# Patient Record
Sex: FEMALE | Race: WHITE | NOT HISPANIC OR LATINO | Employment: OTHER | URBAN - METROPOLITAN AREA
[De-identification: names, ages, dates, MRNs, and addresses within clinical notes are randomized per-mention and may not be internally consistent; named-entity substitution may affect disease eponyms.]

---

## 2017-10-23 ENCOUNTER — CONVERSION ENCOUNTER (OUTPATIENT)
Dept: GENERAL RADIOLOGY | Facility: HOSPITAL | Age: 67
End: 2017-10-23

## 2018-03-19 ENCOUNTER — OFFICE VISIT CONVERTED (OUTPATIENT)
Dept: NEUROLOGY | Facility: CLINIC | Age: 68
End: 2018-03-19
Attending: PSYCHIATRY & NEUROLOGY

## 2018-03-19 ENCOUNTER — CONVERSION ENCOUNTER (OUTPATIENT)
Dept: NEUROLOGY | Facility: CLINIC | Age: 68
End: 2018-03-19

## 2018-04-13 ENCOUNTER — CONVERSION ENCOUNTER (OUTPATIENT)
Dept: SURGERY | Facility: CLINIC | Age: 68
End: 2018-04-13

## 2018-04-13 ENCOUNTER — OFFICE VISIT CONVERTED (OUTPATIENT)
Dept: UROLOGY | Facility: CLINIC | Age: 68
End: 2018-04-13
Attending: UROLOGY

## 2018-05-08 ENCOUNTER — CONVERSION ENCOUNTER (OUTPATIENT)
Dept: SURGERY | Facility: CLINIC | Age: 68
End: 2018-05-08

## 2018-05-08 ENCOUNTER — OFFICE VISIT CONVERTED (OUTPATIENT)
Dept: SURGERY | Facility: CLINIC | Age: 68
End: 2018-05-08
Attending: NURSE PRACTITIONER

## 2018-10-25 ENCOUNTER — CONVERSION ENCOUNTER (OUTPATIENT)
Dept: GENERAL RADIOLOGY | Facility: HOSPITAL | Age: 68
End: 2018-10-25

## 2018-11-20 ENCOUNTER — OFFICE VISIT CONVERTED (OUTPATIENT)
Dept: NEUROSURGERY | Facility: CLINIC | Age: 68
End: 2018-11-20
Attending: PHYSICIAN ASSISTANT

## 2019-01-08 ENCOUNTER — OFFICE VISIT CONVERTED (OUTPATIENT)
Dept: NEUROSURGERY | Facility: CLINIC | Age: 69
End: 2019-01-08
Attending: NEUROLOGICAL SURGERY

## 2020-02-06 ENCOUNTER — OFFICE VISIT CONVERTED (OUTPATIENT)
Dept: ORTHOPEDIC SURGERY | Facility: CLINIC | Age: 70
End: 2020-02-06
Attending: PHYSICIAN ASSISTANT

## 2020-03-02 ENCOUNTER — HOSPITAL ENCOUNTER (OUTPATIENT)
Dept: GENERAL RADIOLOGY | Facility: HOSPITAL | Age: 70
Discharge: HOME | End: 2020-03-02
Attending: NURSE PRACTITIONER

## 2020-06-05 ENCOUNTER — CONVERSION ENCOUNTER (OUTPATIENT)
Dept: FAMILY MEDICINE CLINIC | Facility: CLINIC | Age: 70
End: 2020-06-05

## 2020-06-05 ENCOUNTER — HOSPITAL ENCOUNTER (OUTPATIENT)
Dept: LAB | Facility: HOSPITAL | Age: 70
Discharge: HOME | End: 2020-06-05
Attending: FAMILY MEDICINE

## 2020-06-05 ENCOUNTER — OFFICE VISIT CONVERTED (OUTPATIENT)
Dept: FAMILY MEDICINE CLINIC | Facility: CLINIC | Age: 70
End: 2020-06-05
Attending: FAMILY MEDICINE

## 2020-06-05 LAB
ALBUMIN SERPL-MCNC: 4.4 G/DL (ref 3.5–5)
ALBUMIN/GLOB SERPL: 1.4 {RATIO} (ref 1.4–2.6)
ALP SERPL-CCNC: 74 U/L (ref 43–160)
ALT SERPL-CCNC: 15 U/L (ref 10–40)
ANION GAP SERPL CALC-SCNC: 19 MMOL/L (ref 8–19)
AST SERPL-CCNC: 26 U/L (ref 15–50)
BASOPHILS # BLD AUTO: 0.05 10*3/UL (ref 0–0.2)
BASOPHILS NFR BLD AUTO: 0.8 % (ref 0–3)
BILIRUB SERPL-MCNC: 0.23 MG/DL (ref 0.2–1.3)
BUN SERPL-MCNC: 14 MG/DL (ref 5–25)
BUN/CREAT SERPL: 18 {RATIO} (ref 6–20)
CALCIUM SERPL-MCNC: 9.9 MG/DL (ref 8.7–10.4)
CHLORIDE SERPL-SCNC: 102 MMOL/L (ref 99–111)
CHOLEST SERPL-MCNC: 242 MG/DL (ref 107–200)
CHOLEST/HDLC SERPL: 3.4 {RATIO} (ref 3–6)
CONV ABS IMM GRAN: 0.01 10*3/UL (ref 0–0.2)
CONV CO2: 25 MMOL/L (ref 22–32)
CONV IMMATURE GRAN: 0.2 % (ref 0–1.8)
CONV TOTAL PROTEIN: 7.5 G/DL (ref 6.3–8.2)
CREAT UR-MCNC: 0.79 MG/DL (ref 0.5–0.9)
DEPRECATED RDW RBC AUTO: 49.3 FL (ref 36.4–46.3)
EOSINOPHIL # BLD AUTO: 0.04 10*3/UL (ref 0–0.7)
EOSINOPHIL # BLD AUTO: 0.6 % (ref 0–7)
ERYTHROCYTE [DISTWIDTH] IN BLOOD BY AUTOMATED COUNT: 13.2 % (ref 11.7–14.4)
GFR SERPLBLD BASED ON 1.73 SQ M-ARVRAT: >60 ML/MIN/{1.73_M2}
GLOBULIN UR ELPH-MCNC: 3.1 G/DL (ref 2–3.5)
GLUCOSE SERPL-MCNC: 96 MG/DL (ref 65–99)
HCT VFR BLD AUTO: 44.3 % (ref 37–47)
HDLC SERPL-MCNC: 72 MG/DL (ref 40–60)
HGB BLD-MCNC: 14.2 G/DL (ref 12–16)
LDLC SERPL CALC-MCNC: 143 MG/DL (ref 70–100)
LYMPHOCYTES # BLD AUTO: 2.54 10*3/UL (ref 1–5)
LYMPHOCYTES NFR BLD AUTO: 40.3 % (ref 20–45)
MCH RBC QN AUTO: 32.3 PG (ref 27–31)
MCHC RBC AUTO-ENTMCNC: 32.1 G/DL (ref 33–37)
MCV RBC AUTO: 100.9 FL (ref 81–99)
MONOCYTES # BLD AUTO: 0.48 10*3/UL (ref 0.2–1.2)
MONOCYTES NFR BLD AUTO: 7.6 % (ref 3–10)
NEUTROPHILS # BLD AUTO: 3.19 10*3/UL (ref 2–8)
NEUTROPHILS NFR BLD AUTO: 50.5 % (ref 30–85)
NRBC CBCN: 0 % (ref 0–0.7)
OSMOLALITY SERPL CALC.SUM OF ELEC: 294 MOSM/KG (ref 273–304)
PLATELET # BLD AUTO: 198 10*3/UL (ref 130–400)
PMV BLD AUTO: 10.7 FL (ref 9.4–12.3)
POTASSIUM SERPL-SCNC: 4.4 MMOL/L (ref 3.5–5.3)
RBC # BLD AUTO: 4.39 10*6/UL (ref 4.2–5.4)
SODIUM SERPL-SCNC: 142 MMOL/L (ref 135–147)
T4 FREE SERPL-MCNC: 1.1 NG/DL (ref 0.9–1.8)
TRIGL SERPL-MCNC: 133 MG/DL (ref 40–150)
TSH SERPL-ACNC: 1.44 M[IU]/L (ref 0.27–4.2)
VLDLC SERPL-MCNC: 27 MG/DL (ref 5–37)
WBC # BLD AUTO: 6.31 10*3/UL (ref 4.8–10.8)

## 2020-06-09 ENCOUNTER — HOSPITAL ENCOUNTER (OUTPATIENT)
Dept: LAB | Facility: HOSPITAL | Age: 70
Discharge: HOME | End: 2020-06-09
Attending: FAMILY MEDICINE

## 2020-06-09 LAB
FOLATE SERPL-MCNC: >20 NG/ML (ref 4.8–20)
VIT B12 SERPL-MCNC: 1110 PG/ML (ref 211–911)

## 2020-08-14 ENCOUNTER — CONVERSION ENCOUNTER (OUTPATIENT)
Dept: FAMILY MEDICINE CLINIC | Facility: CLINIC | Age: 70
End: 2020-08-14

## 2020-08-14 ENCOUNTER — OFFICE VISIT CONVERTED (OUTPATIENT)
Dept: FAMILY MEDICINE CLINIC | Facility: CLINIC | Age: 70
End: 2020-08-14
Attending: FAMILY MEDICINE

## 2020-08-17 ENCOUNTER — HOSPITAL ENCOUNTER (OUTPATIENT)
Dept: LAB | Facility: HOSPITAL | Age: 70
Discharge: HOME | End: 2020-08-17
Attending: FAMILY MEDICINE

## 2020-08-18 LAB
DSDNA AB SER-ACNC: NEGATIVE [IU]/ML
ENA AB SER IA-ACNC: NEGATIVE {RATIO}
SJOGREN'S ANTI-SS-A: <0.2 AI (ref 0–0.9)

## 2020-08-31 ENCOUNTER — HOSPITAL ENCOUNTER (OUTPATIENT)
Dept: PREADMISSION TESTING | Facility: HOSPITAL | Age: 70
Discharge: HOME | End: 2020-08-31
Attending: FAMILY MEDICINE

## 2020-09-02 LAB — SARS-COV-2 RNA SPEC QL NAA+PROBE: NOT DETECTED

## 2020-09-04 ENCOUNTER — HOSPITAL ENCOUNTER (OUTPATIENT)
Dept: CARDIOLOGY | Facility: HOSPITAL | Age: 70
Discharge: HOME | End: 2020-09-04
Attending: FAMILY MEDICINE

## 2020-09-15 ENCOUNTER — OFFICE VISIT CONVERTED (OUTPATIENT)
Dept: FAMILY MEDICINE CLINIC | Facility: CLINIC | Age: 70
End: 2020-09-15
Attending: FAMILY MEDICINE

## 2020-12-10 ENCOUNTER — OFFICE VISIT CONVERTED (OUTPATIENT)
Dept: FAMILY MEDICINE CLINIC | Facility: CLINIC | Age: 70
End: 2020-12-10
Attending: FAMILY MEDICINE

## 2020-12-22 ENCOUNTER — TELEMEDICINE CONVERTED (OUTPATIENT)
Dept: FAMILY MEDICINE CLINIC | Facility: CLINIC | Age: 70
End: 2020-12-22
Attending: FAMILY MEDICINE

## 2021-01-19 ENCOUNTER — TELEMEDICINE CONVERTED (OUTPATIENT)
Dept: PSYCHIATRY | Facility: CLINIC | Age: 71
End: 2021-01-19
Attending: STUDENT IN AN ORGANIZED HEALTH CARE EDUCATION/TRAINING PROGRAM

## 2021-01-25 ENCOUNTER — OFFICE VISIT CONVERTED (OUTPATIENT)
Dept: FAMILY MEDICINE CLINIC | Facility: CLINIC | Age: 71
End: 2021-01-25
Attending: FAMILY MEDICINE

## 2021-01-25 ENCOUNTER — CONVERSION ENCOUNTER (OUTPATIENT)
Dept: FAMILY MEDICINE CLINIC | Facility: CLINIC | Age: 71
End: 2021-01-25

## 2021-02-05 ENCOUNTER — TELEMEDICINE CONVERTED (OUTPATIENT)
Dept: PSYCHIATRY | Facility: CLINIC | Age: 71
End: 2021-02-05
Attending: STUDENT IN AN ORGANIZED HEALTH CARE EDUCATION/TRAINING PROGRAM

## 2021-02-09 ENCOUNTER — HOSPITAL ENCOUNTER (OUTPATIENT)
Dept: OTHER | Facility: HOSPITAL | Age: 71
Discharge: HOME | End: 2021-02-09
Attending: FAMILY MEDICINE

## 2021-02-23 ENCOUNTER — OFFICE VISIT CONVERTED (OUTPATIENT)
Dept: FAMILY MEDICINE CLINIC | Facility: CLINIC | Age: 71
End: 2021-02-23
Attending: FAMILY MEDICINE

## 2021-02-23 ENCOUNTER — CONVERSION ENCOUNTER (OUTPATIENT)
Dept: FAMILY MEDICINE CLINIC | Facility: CLINIC | Age: 71
End: 2021-02-23

## 2021-03-08 ENCOUNTER — TELEMEDICINE CONVERTED (OUTPATIENT)
Dept: PSYCHIATRY | Facility: CLINIC | Age: 71
End: 2021-03-08
Attending: STUDENT IN AN ORGANIZED HEALTH CARE EDUCATION/TRAINING PROGRAM

## 2021-05-14 VITALS
OXYGEN SATURATION: 97 % | BODY MASS INDEX: 19.89 KG/M2 | SYSTOLIC BLOOD PRESSURE: 156 MMHG | TEMPERATURE: 98.7 F | HEART RATE: 78 BPM | WEIGHT: 105.37 LBS | DIASTOLIC BLOOD PRESSURE: 71 MMHG | HEIGHT: 61 IN

## 2021-05-14 VITALS
OXYGEN SATURATION: 100 % | DIASTOLIC BLOOD PRESSURE: 68 MMHG | WEIGHT: 104.5 LBS | BODY MASS INDEX: 19.73 KG/M2 | TEMPERATURE: 97.7 F | SYSTOLIC BLOOD PRESSURE: 147 MMHG | HEART RATE: 80 BPM | HEIGHT: 61 IN

## 2021-05-14 VITALS
HEIGHT: 61 IN | WEIGHT: 104.37 LBS | HEART RATE: 78 BPM | OXYGEN SATURATION: 97 % | BODY MASS INDEX: 19.7 KG/M2 | TEMPERATURE: 97.5 F | DIASTOLIC BLOOD PRESSURE: 62 MMHG | SYSTOLIC BLOOD PRESSURE: 164 MMHG

## 2021-05-14 VITALS — DIASTOLIC BLOOD PRESSURE: 70 MMHG | SYSTOLIC BLOOD PRESSURE: 138 MMHG

## 2021-05-14 VITALS
HEART RATE: 72 BPM | SYSTOLIC BLOOD PRESSURE: 130 MMHG | BODY MASS INDEX: 20.01 KG/M2 | TEMPERATURE: 97.3 F | OXYGEN SATURATION: 100 % | WEIGHT: 106 LBS | HEIGHT: 61 IN | DIASTOLIC BLOOD PRESSURE: 100 MMHG

## 2021-05-15 VITALS
SYSTOLIC BLOOD PRESSURE: 170 MMHG | WEIGHT: 106.25 LBS | SYSTOLIC BLOOD PRESSURE: 172 MMHG | HEART RATE: 71 BPM | BODY MASS INDEX: 20.06 KG/M2 | HEIGHT: 61 IN | TEMPERATURE: 98.4 F | OXYGEN SATURATION: 99 % | DIASTOLIC BLOOD PRESSURE: 61 MMHG | DIASTOLIC BLOOD PRESSURE: 82 MMHG

## 2021-05-15 VITALS — HEART RATE: 83 BPM | BODY MASS INDEX: 20.44 KG/M2 | HEIGHT: 61 IN | WEIGHT: 108.25 LBS | OXYGEN SATURATION: 95 %

## 2021-05-15 VITALS
SYSTOLIC BLOOD PRESSURE: 129 MMHG | DIASTOLIC BLOOD PRESSURE: 65 MMHG | TEMPERATURE: 97.8 F | HEART RATE: 67 BPM | HEIGHT: 61 IN | WEIGHT: 107.5 LBS | BODY MASS INDEX: 20.3 KG/M2 | OXYGEN SATURATION: 96 %

## 2021-05-16 VITALS — WEIGHT: 102 LBS | RESPIRATION RATE: 14 BRPM | HEIGHT: 61 IN | BODY MASS INDEX: 19.26 KG/M2

## 2021-05-16 VITALS
HEART RATE: 80 BPM | SYSTOLIC BLOOD PRESSURE: 127 MMHG | HEIGHT: 61 IN | WEIGHT: 103 LBS | BODY MASS INDEX: 19.45 KG/M2 | DIASTOLIC BLOOD PRESSURE: 74 MMHG

## 2021-05-16 VITALS
HEIGHT: 61 IN | SYSTOLIC BLOOD PRESSURE: 127 MMHG | WEIGHT: 105.5 LBS | DIASTOLIC BLOOD PRESSURE: 56 MMHG | BODY MASS INDEX: 19.92 KG/M2

## 2021-05-16 VITALS — HEIGHT: 61 IN | WEIGHT: 102 LBS | RESPIRATION RATE: 16 BRPM | BODY MASS INDEX: 19.26 KG/M2

## 2021-05-16 VITALS
HEIGHT: 61 IN | SYSTOLIC BLOOD PRESSURE: 130 MMHG | BODY MASS INDEX: 19.45 KG/M2 | DIASTOLIC BLOOD PRESSURE: 67 MMHG | HEART RATE: 68 BPM | WEIGHT: 103 LBS

## 2021-05-17 ENCOUNTER — OFFICE VISIT CONVERTED (OUTPATIENT)
Dept: FAMILY MEDICINE CLINIC | Facility: CLINIC | Age: 71
End: 2021-05-17
Attending: FAMILY MEDICINE

## 2021-05-23 ENCOUNTER — TRANSCRIBE ORDERS (OUTPATIENT)
Dept: FAMILY MEDICINE CLINIC | Facility: CLINIC | Age: 71
End: 2021-05-23

## 2021-05-23 DIAGNOSIS — Z12.31 ENCOUNTER FOR SCREENING MAMMOGRAM FOR BREAST CANCER: Primary | ICD-10-CM

## 2021-06-08 ENCOUNTER — TELEMEDICINE (OUTPATIENT)
Dept: PSYCHIATRY | Facility: CLINIC | Age: 71
End: 2021-06-08

## 2021-06-08 DIAGNOSIS — F31.81 BIPOLAR 2 DISORDER (HCC): Primary | ICD-10-CM

## 2021-06-08 PROCEDURE — 99213 OFFICE O/P EST LOW 20 MIN: CPT | Performed by: STUDENT IN AN ORGANIZED HEALTH CARE EDUCATION/TRAINING PROGRAM

## 2021-06-08 RX ORDER — BUDESONIDE AND FORMOTEROL FUMARATE DIHYDRATE 80; 4.5 UG/1; UG/1
AEROSOL RESPIRATORY (INHALATION)
COMMUNITY
Start: 2020-12-10 | End: 2022-03-24

## 2021-06-08 RX ORDER — ACETAMINOPHEN 500 MG
1 TABLET ORAL EVERY 6 HOURS PRN
COMMUNITY

## 2021-06-08 RX ORDER — QUETIAPINE FUMARATE 25 MG/1
75 TABLET, FILM COATED ORAL NIGHTLY
Qty: 270 TABLET | Refills: 1 | Status: SHIPPED | OUTPATIENT
Start: 2021-06-08 | End: 2021-08-09 | Stop reason: SDUPTHER

## 2021-06-08 RX ORDER — FLUTICASONE PROPIONATE 50 MCG
1 SPRAY, SUSPENSION (ML) NASAL DAILY
COMMUNITY
End: 2021-10-06 | Stop reason: SDUPTHER

## 2021-06-08 RX ORDER — CLONAZEPAM 1 MG/1
TABLET ORAL
COMMUNITY
Start: 2021-03-08 | End: 2022-02-14 | Stop reason: SDUPTHER

## 2021-06-08 RX ORDER — LANOLIN ALCOHOL/MO/W.PET/CERES
1 CREAM (GRAM) TOPICAL DAILY
COMMUNITY
End: 2021-08-09

## 2021-06-08 RX ORDER — MULTIVITAMIN WITH IRON
1 TABLET ORAL DAILY
COMMUNITY
End: 2022-10-06

## 2021-06-08 RX ORDER — B-COMPLEX WITH VITAMIN C
TABLET ORAL
COMMUNITY
End: 2022-10-06

## 2021-06-08 RX ORDER — ZOLPIDEM TARTRATE 12.5 MG/1
12.5 TABLET, FILM COATED, EXTENDED RELEASE ORAL NIGHTLY PRN
Qty: 90 TABLET | Refills: 1 | Status: SHIPPED | OUTPATIENT
Start: 2021-06-08 | End: 2021-12-05

## 2021-06-08 RX ORDER — DIPHENOXYLATE HYDROCHLORIDE AND ATROPINE SULFATE 2.5; .025 MG/1; MG/1
1 TABLET ORAL DAILY
COMMUNITY
End: 2022-09-16 | Stop reason: DRUGHIGH

## 2021-06-08 RX ORDER — QUETIAPINE FUMARATE 25 MG/1
TABLET, FILM COATED ORAL
COMMUNITY
Start: 2021-03-09 | End: 2021-06-08 | Stop reason: SDUPTHER

## 2021-07-15 VITALS
BODY MASS INDEX: 20.39 KG/M2 | HEART RATE: 81 BPM | OXYGEN SATURATION: 99 % | HEIGHT: 61 IN | WEIGHT: 108 LBS | SYSTOLIC BLOOD PRESSURE: 147 MMHG | DIASTOLIC BLOOD PRESSURE: 59 MMHG | TEMPERATURE: 97.9 F

## 2021-08-09 ENCOUNTER — OFFICE VISIT (OUTPATIENT)
Dept: PSYCHIATRY | Facility: CLINIC | Age: 71
End: 2021-08-09

## 2021-08-09 VITALS
SYSTOLIC BLOOD PRESSURE: 162 MMHG | HEIGHT: 62 IN | BODY MASS INDEX: 20.09 KG/M2 | DIASTOLIC BLOOD PRESSURE: 73 MMHG | WEIGHT: 109.2 LBS

## 2021-08-09 DIAGNOSIS — F51.05 INSOMNIA DUE TO MENTAL CONDITION: ICD-10-CM

## 2021-08-09 DIAGNOSIS — F31.81 BIPOLAR 2 DISORDER (HCC): Primary | ICD-10-CM

## 2021-08-09 DIAGNOSIS — F41.1 GENERALIZED ANXIETY DISORDER: ICD-10-CM

## 2021-08-09 PROBLEM — K46.9 ABDOMINAL HERNIA: Status: ACTIVE | Noted: 2021-08-09

## 2021-08-09 PROBLEM — F32.A DEPRESSION: Status: ACTIVE | Noted: 2020-01-04

## 2021-08-09 PROBLEM — M51.369 DEGENERATION OF LUMBAR INTERVERTEBRAL DISC: Status: ACTIVE | Noted: 2019-01-08

## 2021-08-09 PROBLEM — E78.5 HYPERLIPIDEMIA: Status: ACTIVE | Noted: 2021-08-09

## 2021-08-09 PROBLEM — R06.02 SHORTNESS OF BREATH: Status: ACTIVE | Noted: 2021-08-09

## 2021-08-09 PROBLEM — R41.3 MEMORY CHANGE: Status: ACTIVE | Noted: 2018-03-19

## 2021-08-09 PROBLEM — K64.9 HEMORRHOID: Status: ACTIVE | Noted: 2021-08-09

## 2021-08-09 PROBLEM — G47.00 INSOMNIA: Status: ACTIVE | Noted: 2020-01-04

## 2021-08-09 PROBLEM — IMO0002 DISPLACEMENT OF INTERVERTEBRAL DISC WITHOUT MYELOPATHY: Status: ACTIVE | Noted: 2021-08-09

## 2021-08-09 PROBLEM — J45.909 ASTHMA: Status: ACTIVE | Noted: 2021-08-09

## 2021-08-09 PROBLEM — M54.2 NECK PAIN: Status: ACTIVE | Noted: 2019-01-08

## 2021-08-09 PROBLEM — F41.9 ANXIETY: Status: ACTIVE | Noted: 2020-01-04

## 2021-08-09 PROBLEM — M47.816 LUMBAR SPONDYLOSIS: Status: ACTIVE | Noted: 2019-05-22

## 2021-08-09 PROBLEM — M79.89 LIMB SWELLING: Status: ACTIVE | Noted: 2021-08-09

## 2021-08-09 PROBLEM — J30.9 ALLERGIC RHINITIS: Status: ACTIVE | Noted: 2021-08-09

## 2021-08-09 PROBLEM — K21.9 ESOPHAGEAL REFLUX: Status: ACTIVE | Noted: 2021-08-09

## 2021-08-09 PROBLEM — IMO0002 DEGENERATION OF INTERVERTEBRAL DISC: Status: ACTIVE | Noted: 2021-08-09

## 2021-08-09 PROBLEM — M54.2 CERVICAL PAIN (NECK): Status: ACTIVE | Noted: 2021-08-09

## 2021-08-09 PROBLEM — J44.9 COPD (CHRONIC OBSTRUCTIVE PULMONARY DISEASE): Status: ACTIVE | Noted: 2021-08-09

## 2021-08-09 PROBLEM — M54.12 CERVICAL RADICULOPATHY: Status: ACTIVE | Noted: 2019-01-08

## 2021-08-09 PROBLEM — M54.50 LOW BACK PAIN: Status: ACTIVE | Noted: 2021-08-09

## 2021-08-09 PROBLEM — M79.606 LEG PAIN: Status: ACTIVE | Noted: 2021-08-09

## 2021-08-09 PROBLEM — M51.36 DEGENERATION OF LUMBAR INTERVERTEBRAL DISC: Status: ACTIVE | Noted: 2019-01-08

## 2021-08-09 PROBLEM — F33.0 MILD EPISODE OF RECURRENT MAJOR DEPRESSIVE DISORDER (HCC): Status: ACTIVE | Noted: 2020-06-05

## 2021-08-09 PROBLEM — M19.90 OSTEOARTHRITIS: Status: ACTIVE | Noted: 2020-01-04

## 2021-08-09 PROBLEM — M19.90 ARTHRITIS: Status: ACTIVE | Noted: 2021-08-09

## 2021-08-09 PROCEDURE — 99214 OFFICE O/P EST MOD 30 MIN: CPT | Performed by: STUDENT IN AN ORGANIZED HEALTH CARE EDUCATION/TRAINING PROGRAM

## 2021-08-09 RX ORDER — QUETIAPINE FUMARATE 25 MG/1
100 TABLET, FILM COATED ORAL NIGHTLY
Qty: 240 TABLET | Refills: 0
Start: 2021-08-09 | End: 2021-08-09 | Stop reason: SDUPTHER

## 2021-08-09 RX ORDER — CELECOXIB 200 MG/1
CAPSULE ORAL
COMMUNITY
Start: 2021-07-05 | End: 2021-11-22

## 2021-08-09 RX ORDER — QUETIAPINE FUMARATE 25 MG/1
100 TABLET, FILM COATED ORAL NIGHTLY
Qty: 360 TABLET | Refills: 1
Start: 2021-08-09 | End: 2021-09-16

## 2021-08-18 ENCOUNTER — OFFICE VISIT (OUTPATIENT)
Dept: PSYCHIATRY | Facility: CLINIC | Age: 71
End: 2021-08-18

## 2021-08-18 VITALS
BODY MASS INDEX: 20.24 KG/M2 | DIASTOLIC BLOOD PRESSURE: 74 MMHG | WEIGHT: 110 LBS | HEIGHT: 62 IN | SYSTOLIC BLOOD PRESSURE: 124 MMHG

## 2021-08-18 DIAGNOSIS — F51.05 INSOMNIA DUE TO MENTAL CONDITION: ICD-10-CM

## 2021-08-18 DIAGNOSIS — F41.1 GENERALIZED ANXIETY DISORDER: ICD-10-CM

## 2021-08-18 DIAGNOSIS — F31.81 BIPOLAR 2 DISORDER (HCC): Primary | ICD-10-CM

## 2021-08-18 PROCEDURE — 99214 OFFICE O/P EST MOD 30 MIN: CPT | Performed by: STUDENT IN AN ORGANIZED HEALTH CARE EDUCATION/TRAINING PROGRAM

## 2021-08-24 ENCOUNTER — TELEPHONE (OUTPATIENT)
Dept: GASTROENTEROLOGY | Facility: CLINIC | Age: 71
End: 2021-08-24

## 2021-08-24 RX ORDER — SODIUM, POTASSIUM,MAG SULFATES 17.5-3.13G
1 SOLUTION, RECONSTITUTED, ORAL ORAL EVERY 12 HOURS
Qty: 177 ML | Refills: 0 | Status: SHIPPED | OUTPATIENT
Start: 2021-08-24 | End: 2022-02-14

## 2021-08-26 ENCOUNTER — TELEPHONE (OUTPATIENT)
Dept: GASTROENTEROLOGY | Facility: CLINIC | Age: 71
End: 2021-08-26

## 2021-08-26 RX ORDER — SODIUM, POTASSIUM,MAG SULFATES 17.5-3.13G
1 SOLUTION, RECONSTITUTED, ORAL ORAL EVERY 12 HOURS
Qty: 354 ML | Refills: 0 | Status: SHIPPED | OUTPATIENT
Start: 2021-08-26 | End: 2022-02-14

## 2021-09-16 ENCOUNTER — TELEMEDICINE (OUTPATIENT)
Dept: PSYCHIATRY | Facility: CLINIC | Age: 71
End: 2021-09-16

## 2021-09-16 VITALS
DIASTOLIC BLOOD PRESSURE: 74 MMHG | WEIGHT: 109 LBS | SYSTOLIC BLOOD PRESSURE: 155 MMHG | HEIGHT: 63 IN | BODY MASS INDEX: 19.31 KG/M2

## 2021-09-16 DIAGNOSIS — F51.05 INSOMNIA DUE TO MENTAL CONDITION: ICD-10-CM

## 2021-09-16 DIAGNOSIS — F41.1 GENERALIZED ANXIETY DISORDER: ICD-10-CM

## 2021-09-16 DIAGNOSIS — F31.81 BIPOLAR 2 DISORDER (HCC): Primary | ICD-10-CM

## 2021-09-16 PROCEDURE — 99214 OFFICE O/P EST MOD 30 MIN: CPT | Performed by: STUDENT IN AN ORGANIZED HEALTH CARE EDUCATION/TRAINING PROGRAM

## 2021-09-16 RX ORDER — UREA 10 %
3 LOTION (ML) TOPICAL NIGHTLY
COMMUNITY
End: 2022-10-06

## 2021-09-16 RX ORDER — ALBUTEROL SULFATE 90 UG/1
2 AEROSOL, METERED RESPIRATORY (INHALATION) EVERY 4 HOURS PRN
COMMUNITY

## 2021-09-16 RX ORDER — QUETIAPINE 150 MG/1
150 TABLET, FILM COATED, EXTENDED RELEASE ORAL NIGHTLY
Qty: 30 TABLET | Refills: 2 | Status: SHIPPED | OUTPATIENT
Start: 2021-09-16 | End: 2021-10-14 | Stop reason: SDUPTHER

## 2021-09-16 RX ORDER — LIDOCAINE 50 MG/G
1 PATCH TOPICAL EVERY 24 HOURS
COMMUNITY
End: 2022-02-09 | Stop reason: SDUPTHER

## 2021-10-07 ENCOUNTER — OFFICE VISIT (OUTPATIENT)
Dept: SLEEP MEDICINE | Facility: HOSPITAL | Age: 71
End: 2021-10-07

## 2021-10-07 VITALS
DIASTOLIC BLOOD PRESSURE: 84 MMHG | WEIGHT: 112.2 LBS | SYSTOLIC BLOOD PRESSURE: 146 MMHG | HEART RATE: 85 BPM | BODY MASS INDEX: 21.18 KG/M2 | OXYGEN SATURATION: 98 % | TEMPERATURE: 94 F | HEIGHT: 61 IN

## 2021-10-07 DIAGNOSIS — G47.61 PLMD (PERIODIC LIMB MOVEMENT DISORDER): Primary | ICD-10-CM

## 2021-10-07 PROCEDURE — G0463 HOSPITAL OUTPT CLINIC VISIT: HCPCS

## 2021-10-07 RX ORDER — FLUTICASONE PROPIONATE 50 MCG
1 SPRAY, SUSPENSION (ML) NASAL DAILY
Qty: 16 G | Refills: 3 | Status: SHIPPED | OUTPATIENT
Start: 2021-10-07

## 2021-10-14 DIAGNOSIS — F51.05 INSOMNIA DUE TO MENTAL CONDITION: ICD-10-CM

## 2021-10-14 DIAGNOSIS — F31.81 BIPOLAR 2 DISORDER (HCC): ICD-10-CM

## 2021-10-14 RX ORDER — QUETIAPINE 150 MG/1
150 TABLET, FILM COATED, EXTENDED RELEASE ORAL NIGHTLY
Qty: 90 TABLET | Refills: 1 | Status: SHIPPED | OUTPATIENT
Start: 2021-10-14 | End: 2021-10-29 | Stop reason: SINTOL

## 2021-10-29 ENCOUNTER — OFFICE VISIT (OUTPATIENT)
Dept: PSYCHIATRY | Facility: CLINIC | Age: 71
End: 2021-10-29

## 2021-10-29 VITALS
HEIGHT: 62 IN | WEIGHT: 112.4 LBS | SYSTOLIC BLOOD PRESSURE: 177 MMHG | BODY MASS INDEX: 20.68 KG/M2 | DIASTOLIC BLOOD PRESSURE: 75 MMHG

## 2021-10-29 DIAGNOSIS — F41.1 GENERALIZED ANXIETY DISORDER: ICD-10-CM

## 2021-10-29 DIAGNOSIS — F51.05 INSOMNIA DUE TO MENTAL CONDITION: ICD-10-CM

## 2021-10-29 DIAGNOSIS — F31.81 BIPOLAR 2 DISORDER (HCC): Primary | ICD-10-CM

## 2021-10-29 PROCEDURE — 90833 PSYTX W PT W E/M 30 MIN: CPT | Performed by: STUDENT IN AN ORGANIZED HEALTH CARE EDUCATION/TRAINING PROGRAM

## 2021-10-29 PROCEDURE — 99214 OFFICE O/P EST MOD 30 MIN: CPT | Performed by: STUDENT IN AN ORGANIZED HEALTH CARE EDUCATION/TRAINING PROGRAM

## 2021-10-29 RX ORDER — LURASIDONE HYDROCHLORIDE 20 MG/1
20 TABLET, FILM COATED ORAL DAILY
Qty: 28 TABLET | Refills: 0 | COMMUNITY
Start: 2021-10-29 | End: 2021-10-29 | Stop reason: SDUPTHER

## 2021-10-29 RX ORDER — LURASIDONE HYDROCHLORIDE 40 MG/1
40 TABLET, FILM COATED ORAL DAILY
Qty: 28 TABLET | Refills: 0 | COMMUNITY
Start: 2021-10-29 | End: 2022-02-10

## 2021-10-29 RX ORDER — CETIRIZINE HYDROCHLORIDE 10 MG/1
10 TABLET ORAL DAILY
COMMUNITY
End: 2022-03-24

## 2021-11-03 ENCOUNTER — TELEPHONE (OUTPATIENT)
Dept: PSYCHIATRY | Facility: CLINIC | Age: 71
End: 2021-11-03

## 2021-11-04 ENCOUNTER — APPOINTMENT (OUTPATIENT)
Dept: MAMMOGRAPHY | Facility: HOSPITAL | Age: 71
End: 2021-11-04

## 2021-11-15 ENCOUNTER — TELEMEDICINE (OUTPATIENT)
Dept: PSYCHIATRY | Facility: CLINIC | Age: 71
End: 2021-11-15

## 2021-11-15 VITALS
SYSTOLIC BLOOD PRESSURE: 172 MMHG | WEIGHT: 110.8 LBS | HEIGHT: 62 IN | BODY MASS INDEX: 20.39 KG/M2 | DIASTOLIC BLOOD PRESSURE: 75 MMHG

## 2021-11-15 DIAGNOSIS — F31.81 BIPOLAR 2 DISORDER (HCC): Primary | ICD-10-CM

## 2021-11-15 DIAGNOSIS — F41.1 GENERALIZED ANXIETY DISORDER: ICD-10-CM

## 2021-11-15 DIAGNOSIS — F51.05 INSOMNIA DUE TO MENTAL CONDITION: ICD-10-CM

## 2021-11-15 PROCEDURE — 99214 OFFICE O/P EST MOD 30 MIN: CPT | Performed by: STUDENT IN AN ORGANIZED HEALTH CARE EDUCATION/TRAINING PROGRAM

## 2021-11-15 PROCEDURE — 90833 PSYTX W PT W E/M 30 MIN: CPT | Performed by: STUDENT IN AN ORGANIZED HEALTH CARE EDUCATION/TRAINING PROGRAM

## 2021-11-15 RX ORDER — QUETIAPINE FUMARATE 100 MG/1
100 TABLET, FILM COATED ORAL NIGHTLY
COMMUNITY
End: 2021-11-30 | Stop reason: SDUPTHER

## 2021-11-18 ENCOUNTER — OFFICE VISIT (OUTPATIENT)
Dept: FAMILY MEDICINE CLINIC | Facility: CLINIC | Age: 71
End: 2021-11-18

## 2021-11-18 ENCOUNTER — LAB (OUTPATIENT)
Dept: LAB | Facility: HOSPITAL | Age: 71
End: 2021-11-18

## 2021-11-18 VITALS
OXYGEN SATURATION: 95 % | HEIGHT: 62 IN | TEMPERATURE: 97.1 F | HEART RATE: 92 BPM | WEIGHT: 110.4 LBS | BODY MASS INDEX: 20.32 KG/M2 | DIASTOLIC BLOOD PRESSURE: 62 MMHG | SYSTOLIC BLOOD PRESSURE: 164 MMHG

## 2021-11-18 DIAGNOSIS — R03.0 ELEVATED BLOOD PRESSURE READING IN OFFICE WITHOUT DIAGNOSIS OF HYPERTENSION: ICD-10-CM

## 2021-11-18 DIAGNOSIS — Z23 NEED FOR INFLUENZA VACCINATION: Primary | ICD-10-CM

## 2021-11-18 DIAGNOSIS — J43.9 PULMONARY EMPHYSEMA, UNSPECIFIED EMPHYSEMA TYPE (HCC): ICD-10-CM

## 2021-11-18 DIAGNOSIS — F33.0 MILD EPISODE OF RECURRENT MAJOR DEPRESSIVE DISORDER (HCC): ICD-10-CM

## 2021-11-18 DIAGNOSIS — E78.5 HYPERLIPIDEMIA, UNSPECIFIED HYPERLIPIDEMIA TYPE: ICD-10-CM

## 2021-11-18 DIAGNOSIS — K21.9 GASTROESOPHAGEAL REFLUX DISEASE WITHOUT ESOPHAGITIS: ICD-10-CM

## 2021-11-18 LAB
ALBUMIN SERPL-MCNC: 4.4 G/DL (ref 3.5–5.2)
ALBUMIN/GLOB SERPL: 1.4 G/DL
ALP SERPL-CCNC: 95 U/L (ref 39–117)
ALT SERPL W P-5'-P-CCNC: 13 U/L (ref 1–33)
ANION GAP SERPL CALCULATED.3IONS-SCNC: 8.8 MMOL/L (ref 5–15)
AST SERPL-CCNC: 30 U/L (ref 1–32)
BILIRUB SERPL-MCNC: <0.2 MG/DL (ref 0–1.2)
BUN SERPL-MCNC: 14 MG/DL (ref 8–23)
BUN/CREAT SERPL: 13.1 (ref 7–25)
CALCIUM SPEC-SCNC: 9.9 MG/DL (ref 8.6–10.5)
CHLORIDE SERPL-SCNC: 99 MMOL/L (ref 98–107)
CHOLEST SERPL-MCNC: 279 MG/DL (ref 0–200)
CO2 SERPL-SCNC: 32.2 MMOL/L (ref 22–29)
CREAT SERPL-MCNC: 1.07 MG/DL (ref 0.57–1)
GFR SERPL CREATININE-BSD FRML MDRD: 50 ML/MIN/1.73
GLOBULIN UR ELPH-MCNC: 3.2 GM/DL
GLUCOSE SERPL-MCNC: 90 MG/DL (ref 65–99)
HDLC SERPL-MCNC: 81 MG/DL (ref 40–60)
LDLC SERPL CALC-MCNC: 182 MG/DL (ref 0–100)
LDLC/HDLC SERPL: 2.2 {RATIO}
POTASSIUM SERPL-SCNC: 4.2 MMOL/L (ref 3.5–5.2)
PROT SERPL-MCNC: 7.6 G/DL (ref 6–8.5)
SODIUM SERPL-SCNC: 140 MMOL/L (ref 136–145)
T4 FREE SERPL-MCNC: 1.11 NG/DL (ref 0.93–1.7)
TRIGL SERPL-MCNC: 97 MG/DL (ref 0–150)
TSH SERPL DL<=0.05 MIU/L-ACNC: 0.98 UIU/ML (ref 0.27–4.2)
VLDLC SERPL-MCNC: 16 MG/DL (ref 5–40)

## 2021-11-18 PROCEDURE — 84439 ASSAY OF FREE THYROXINE: CPT | Performed by: FAMILY MEDICINE

## 2021-11-18 PROCEDURE — 84443 ASSAY THYROID STIM HORMONE: CPT | Performed by: FAMILY MEDICINE

## 2021-11-18 PROCEDURE — 90662 IIV NO PRSV INCREASED AG IM: CPT | Performed by: FAMILY MEDICINE

## 2021-11-18 PROCEDURE — G0008 ADMIN INFLUENZA VIRUS VAC: HCPCS | Performed by: FAMILY MEDICINE

## 2021-11-18 PROCEDURE — 99214 OFFICE O/P EST MOD 30 MIN: CPT | Performed by: FAMILY MEDICINE

## 2021-11-18 PROCEDURE — 80061 LIPID PANEL: CPT | Performed by: FAMILY MEDICINE

## 2021-11-18 PROCEDURE — 80053 COMPREHEN METABOLIC PANEL: CPT | Performed by: FAMILY MEDICINE

## 2021-11-18 RX ORDER — METOPROLOL SUCCINATE 50 MG/1
50 TABLET, EXTENDED RELEASE ORAL DAILY
Qty: 90 TABLET | Refills: 0 | Status: SHIPPED | OUTPATIENT
Start: 2021-11-18 | End: 2022-02-09

## 2021-11-22 DIAGNOSIS — E78.5 HYPERLIPIDEMIA, UNSPECIFIED HYPERLIPIDEMIA TYPE: Primary | ICD-10-CM

## 2021-11-22 RX ORDER — ATORVASTATIN CALCIUM 20 MG/1
20 TABLET, FILM COATED ORAL NIGHTLY
Qty: 90 TABLET | Refills: 0 | Status: SHIPPED | OUTPATIENT
Start: 2021-11-22 | End: 2022-02-09 | Stop reason: SDUPTHER

## 2021-11-23 DIAGNOSIS — E78.5 HYPERLIPIDEMIA, UNSPECIFIED HYPERLIPIDEMIA TYPE: Primary | ICD-10-CM

## 2021-11-23 DIAGNOSIS — N28.9 KIDNEY FUNCTION ABNORMAL: ICD-10-CM

## 2021-11-29 ENCOUNTER — HOSPITAL ENCOUNTER (OUTPATIENT)
Dept: SLEEP MEDICINE | Facility: HOSPITAL | Age: 71
End: 2021-11-29

## 2021-11-30 DIAGNOSIS — F51.05 INSOMNIA DUE TO MENTAL CONDITION: Primary | ICD-10-CM

## 2021-11-30 RX ORDER — QUETIAPINE FUMARATE 100 MG/1
100 TABLET, FILM COATED ORAL NIGHTLY
Qty: 90 TABLET | Refills: 0 | Status: SHIPPED | OUTPATIENT
Start: 2021-11-30 | End: 2022-02-10

## 2022-01-13 ENCOUNTER — LAB (OUTPATIENT)
Dept: LAB | Facility: HOSPITAL | Age: 72
End: 2022-01-13

## 2022-01-13 DIAGNOSIS — E78.5 HYPERLIPIDEMIA, UNSPECIFIED HYPERLIPIDEMIA TYPE: ICD-10-CM

## 2022-01-13 DIAGNOSIS — N28.9 KIDNEY FUNCTION ABNORMAL: ICD-10-CM

## 2022-01-13 LAB
ALBUMIN SERPL-MCNC: 4.5 G/DL (ref 3.5–5.2)
ALBUMIN/GLOB SERPL: 1.5 G/DL
ALP SERPL-CCNC: 87 U/L (ref 39–117)
ALT SERPL W P-5'-P-CCNC: 25 U/L (ref 1–33)
ANION GAP SERPL CALCULATED.3IONS-SCNC: 6.6 MMOL/L (ref 5–15)
AST SERPL-CCNC: 35 U/L (ref 1–32)
BILIRUB SERPL-MCNC: 0.2 MG/DL (ref 0–1.2)
BUN SERPL-MCNC: 14 MG/DL (ref 8–23)
BUN/CREAT SERPL: 15.1 (ref 7–25)
CALCIUM SPEC-SCNC: 10 MG/DL (ref 8.6–10.5)
CHLORIDE SERPL-SCNC: 101 MMOL/L (ref 98–107)
CHOLEST SERPL-MCNC: 162 MG/DL (ref 0–200)
CO2 SERPL-SCNC: 32.4 MMOL/L (ref 22–29)
CREAT SERPL-MCNC: 0.93 MG/DL (ref 0.57–1)
GFR SERPL CREATININE-BSD FRML MDRD: 59 ML/MIN/1.73
GLOBULIN UR ELPH-MCNC: 3.1 GM/DL
GLUCOSE SERPL-MCNC: 91 MG/DL (ref 65–99)
HDLC SERPL-MCNC: 68 MG/DL (ref 40–60)
LDLC SERPL CALC-MCNC: 73 MG/DL (ref 0–100)
LDLC/HDLC SERPL: 1.03 {RATIO}
POTASSIUM SERPL-SCNC: 4.2 MMOL/L (ref 3.5–5.2)
PROT SERPL-MCNC: 7.6 G/DL (ref 6–8.5)
SODIUM SERPL-SCNC: 140 MMOL/L (ref 136–145)
TRIGL SERPL-MCNC: 121 MG/DL (ref 0–150)
VLDLC SERPL-MCNC: 21 MG/DL (ref 5–40)

## 2022-01-13 PROCEDURE — 80061 LIPID PANEL: CPT

## 2022-01-13 PROCEDURE — 36415 COLL VENOUS BLD VENIPUNCTURE: CPT

## 2022-01-13 PROCEDURE — 80053 COMPREHEN METABOLIC PANEL: CPT

## 2022-01-14 ENCOUNTER — TELEPHONE (OUTPATIENT)
Dept: FAMILY MEDICINE CLINIC | Facility: CLINIC | Age: 72
End: 2022-01-14

## 2022-02-09 DIAGNOSIS — F51.05 INSOMNIA DUE TO MENTAL CONDITION: ICD-10-CM

## 2022-02-09 RX ORDER — LIDOCAINE 50 MG/G
1 PATCH TOPICAL EVERY 24 HOURS
Qty: 90 EACH | Refills: 3 | Status: SHIPPED | OUTPATIENT
Start: 2022-02-09

## 2022-02-09 RX ORDER — ATORVASTATIN CALCIUM 20 MG/1
20 TABLET, FILM COATED ORAL NIGHTLY
Qty: 90 TABLET | Refills: 3 | Status: SHIPPED | OUTPATIENT
Start: 2022-02-09 | End: 2022-02-14

## 2022-02-09 RX ORDER — METOPROLOL SUCCINATE 50 MG/1
50 TABLET, EXTENDED RELEASE ORAL DAILY
Qty: 90 TABLET | Refills: 3 | Status: SHIPPED | OUTPATIENT
Start: 2022-02-09 | End: 2022-03-21 | Stop reason: SDUPTHER

## 2022-02-09 RX ORDER — METOPROLOL SUCCINATE 50 MG/1
TABLET, EXTENDED RELEASE ORAL
Qty: 90 TABLET | Refills: 1 | Status: SHIPPED | OUTPATIENT
Start: 2022-02-09 | End: 2022-02-09 | Stop reason: SDUPTHER

## 2022-02-10 RX ORDER — QUETIAPINE FUMARATE 100 MG/1
TABLET, FILM COATED ORAL
Qty: 90 TABLET | Refills: 0 | Status: SHIPPED | OUTPATIENT
Start: 2022-02-10 | End: 2022-03-24 | Stop reason: SDUPTHER

## 2022-02-14 ENCOUNTER — OFFICE VISIT (OUTPATIENT)
Dept: PSYCHIATRY | Facility: CLINIC | Age: 72
End: 2022-02-14

## 2022-02-14 VITALS
HEIGHT: 62 IN | SYSTOLIC BLOOD PRESSURE: 143 MMHG | BODY MASS INDEX: 20.98 KG/M2 | DIASTOLIC BLOOD PRESSURE: 67 MMHG | WEIGHT: 114 LBS

## 2022-02-14 DIAGNOSIS — F41.1 GENERALIZED ANXIETY DISORDER: ICD-10-CM

## 2022-02-14 DIAGNOSIS — F31.81 BIPOLAR 2 DISORDER: Primary | ICD-10-CM

## 2022-02-14 DIAGNOSIS — F51.05 INSOMNIA DUE TO MENTAL CONDITION: ICD-10-CM

## 2022-02-14 PROCEDURE — 99214 OFFICE O/P EST MOD 30 MIN: CPT | Performed by: STUDENT IN AN ORGANIZED HEALTH CARE EDUCATION/TRAINING PROGRAM

## 2022-02-14 PROCEDURE — 90833 PSYTX W PT W E/M 30 MIN: CPT | Performed by: STUDENT IN AN ORGANIZED HEALTH CARE EDUCATION/TRAINING PROGRAM

## 2022-02-14 RX ORDER — CLONAZEPAM 1 MG/1
1 TABLET ORAL NIGHTLY PRN
Qty: 30 TABLET | Refills: 2 | Status: SHIPPED | OUTPATIENT
Start: 2022-02-14 | End: 2022-02-25

## 2022-02-14 RX ORDER — QUETIAPINE FUMARATE 25 MG/1
TABLET, FILM COATED ORAL
Qty: 21 TABLET | Refills: 0 | Status: SHIPPED | OUTPATIENT
Start: 2022-02-14 | End: 2022-03-24 | Stop reason: SDUPTHER

## 2022-02-14 RX ORDER — ARIPIPRAZOLE 5 MG/1
TABLET ORAL
Qty: 53 TABLET | Refills: 2 | Status: SHIPPED | OUTPATIENT
Start: 2022-02-14 | End: 2022-02-16 | Stop reason: SDUPTHER

## 2022-02-14 RX ORDER — ATORVASTATIN CALCIUM 20 MG/1
20 TABLET, FILM COATED ORAL NIGHTLY
Qty: 90 TABLET | Refills: 3 | Status: SHIPPED | OUTPATIENT
Start: 2022-02-14 | End: 2022-05-15

## 2022-02-14 RX ORDER — HYDROXYZINE HYDROCHLORIDE 25 MG/1
50 TABLET, FILM COATED ORAL NIGHTLY
Qty: 60 TABLET | Refills: 2 | Status: SHIPPED | OUTPATIENT
Start: 2022-02-14 | End: 2022-02-25

## 2022-02-14 RX ORDER — ASCORBIC ACID 500 MG
500 TABLET ORAL DAILY
COMMUNITY
End: 2022-10-06

## 2022-02-16 ENCOUNTER — TELEPHONE (OUTPATIENT)
Dept: FAMILY MEDICINE CLINIC | Facility: CLINIC | Age: 72
End: 2022-02-16

## 2022-02-16 DIAGNOSIS — F51.05 INSOMNIA DUE TO MENTAL CONDITION: ICD-10-CM

## 2022-02-16 DIAGNOSIS — F31.81 BIPOLAR 2 DISORDER: ICD-10-CM

## 2022-02-16 RX ORDER — ARIPIPRAZOLE 10 MG/1
TABLET ORAL
Qty: 23 TABLET | Refills: 2 | Status: SHIPPED | OUTPATIENT
Start: 2022-02-16 | End: 2022-02-25

## 2022-02-21 ENCOUNTER — TELEPHONE (OUTPATIENT)
Dept: PSYCHIATRY | Facility: CLINIC | Age: 72
End: 2022-02-21

## 2022-02-25 ENCOUNTER — OFFICE VISIT (OUTPATIENT)
Dept: PSYCHIATRY | Facility: CLINIC | Age: 72
End: 2022-02-25

## 2022-02-25 VITALS
BODY MASS INDEX: 21.52 KG/M2 | SYSTOLIC BLOOD PRESSURE: 125 MMHG | DIASTOLIC BLOOD PRESSURE: 54 MMHG | WEIGHT: 114 LBS | HEIGHT: 61 IN

## 2022-02-25 DIAGNOSIS — F41.1 GENERALIZED ANXIETY DISORDER: ICD-10-CM

## 2022-02-25 DIAGNOSIS — F51.05 INSOMNIA DUE TO MENTAL CONDITION: ICD-10-CM

## 2022-02-25 DIAGNOSIS — F31.81 BIPOLAR 2 DISORDER: Primary | ICD-10-CM

## 2022-02-25 PROCEDURE — 90833 PSYTX W PT W E/M 30 MIN: CPT | Performed by: STUDENT IN AN ORGANIZED HEALTH CARE EDUCATION/TRAINING PROGRAM

## 2022-02-25 PROCEDURE — 99214 OFFICE O/P EST MOD 30 MIN: CPT | Performed by: STUDENT IN AN ORGANIZED HEALTH CARE EDUCATION/TRAINING PROGRAM

## 2022-02-25 RX ORDER — ZOLPIDEM TARTRATE 10 MG/1
10 TABLET ORAL NIGHTLY PRN
Qty: 30 TABLET | Refills: 2 | Status: SHIPPED | OUTPATIENT
Start: 2022-02-25 | End: 2022-03-24

## 2022-03-02 ENCOUNTER — TELEPHONE (OUTPATIENT)
Dept: PSYCHIATRY | Facility: CLINIC | Age: 72
End: 2022-03-02

## 2022-03-04 ENCOUNTER — TELEPHONE (OUTPATIENT)
Dept: BEHAVIORAL HEALTH | Facility: CLINIC | Age: 72
End: 2022-03-04

## 2022-03-21 RX ORDER — METOPROLOL SUCCINATE 50 MG/1
50 TABLET, EXTENDED RELEASE ORAL DAILY
Qty: 90 TABLET | Refills: 3 | Status: SHIPPED | OUTPATIENT
Start: 2022-03-21 | End: 2022-09-22

## 2022-03-24 ENCOUNTER — OFFICE VISIT (OUTPATIENT)
Dept: PSYCHIATRY | Facility: CLINIC | Age: 72
End: 2022-03-24

## 2022-03-24 VITALS
BODY MASS INDEX: 20.98 KG/M2 | WEIGHT: 114 LBS | HEIGHT: 62 IN | SYSTOLIC BLOOD PRESSURE: 125 MMHG | DIASTOLIC BLOOD PRESSURE: 64 MMHG

## 2022-03-24 DIAGNOSIS — F31.81 BIPOLAR 2 DISORDER: Primary | ICD-10-CM

## 2022-03-24 DIAGNOSIS — F41.1 GENERALIZED ANXIETY DISORDER: ICD-10-CM

## 2022-03-24 DIAGNOSIS — F51.05 INSOMNIA DUE TO MENTAL CONDITION: ICD-10-CM

## 2022-03-24 PROCEDURE — 99214 OFFICE O/P EST MOD 30 MIN: CPT | Performed by: STUDENT IN AN ORGANIZED HEALTH CARE EDUCATION/TRAINING PROGRAM

## 2022-03-24 PROCEDURE — 90833 PSYTX W PT W E/M 30 MIN: CPT | Performed by: STUDENT IN AN ORGANIZED HEALTH CARE EDUCATION/TRAINING PROGRAM

## 2022-03-24 RX ORDER — CLONAZEPAM 1 MG/1
1 TABLET ORAL NIGHTLY PRN
COMMUNITY
End: 2022-07-05

## 2022-03-24 RX ORDER — DOCUSATE SODIUM 100 MG/1
100 CAPSULE, LIQUID FILLED ORAL 2 TIMES DAILY
COMMUNITY
End: 2022-10-06

## 2022-03-24 RX ORDER — QUETIAPINE FUMARATE 100 MG/1
150 TABLET, FILM COATED ORAL NIGHTLY
Qty: 135 TABLET | Refills: 2 | Status: SHIPPED | OUTPATIENT
Start: 2022-03-24 | End: 2022-10-05 | Stop reason: SDDI

## 2022-03-24 RX ORDER — TRAZODONE HYDROCHLORIDE 50 MG/1
50 TABLET ORAL NIGHTLY
Qty: 90 TABLET | Refills: 1 | Status: SHIPPED | OUTPATIENT
Start: 2022-03-24 | End: 2022-06-24

## 2022-05-17 ENCOUNTER — OFFICE VISIT (OUTPATIENT)
Dept: FAMILY MEDICINE CLINIC | Facility: CLINIC | Age: 72
End: 2022-05-17

## 2022-05-17 VITALS
HEIGHT: 61 IN | SYSTOLIC BLOOD PRESSURE: 147 MMHG | OXYGEN SATURATION: 95 % | WEIGHT: 115 LBS | DIASTOLIC BLOOD PRESSURE: 70 MMHG | HEART RATE: 55 BPM | BODY MASS INDEX: 21.71 KG/M2 | TEMPERATURE: 98.6 F

## 2022-05-17 DIAGNOSIS — E78.5 HYPERLIPIDEMIA, UNSPECIFIED HYPERLIPIDEMIA TYPE: Primary | ICD-10-CM

## 2022-05-17 DIAGNOSIS — R03.0 ELEVATED BLOOD PRESSURE READING IN OFFICE WITHOUT DIAGNOSIS OF HYPERTENSION: ICD-10-CM

## 2022-05-17 DIAGNOSIS — J43.9 PULMONARY EMPHYSEMA, UNSPECIFIED EMPHYSEMA TYPE: ICD-10-CM

## 2022-05-17 PROCEDURE — 99214 OFFICE O/P EST MOD 30 MIN: CPT | Performed by: FAMILY MEDICINE

## 2022-05-17 RX ORDER — ATORVASTATIN CALCIUM 20 MG/1
20 TABLET, FILM COATED ORAL DAILY
COMMUNITY
End: 2022-05-17 | Stop reason: SDUPTHER

## 2022-05-17 RX ORDER — ATORVASTATIN CALCIUM 20 MG/1
20 TABLET, FILM COATED ORAL DAILY
Qty: 90 TABLET | Refills: 3 | Status: SHIPPED | OUTPATIENT
Start: 2022-05-17 | End: 2022-10-05 | Stop reason: SINTOL

## 2022-05-23 ENCOUNTER — LAB (OUTPATIENT)
Dept: LAB | Facility: HOSPITAL | Age: 72
End: 2022-05-23

## 2022-05-23 LAB
ALBUMIN SERPL-MCNC: 4.2 G/DL (ref 3.5–5.2)
ALBUMIN/GLOB SERPL: 1.6 G/DL
ALP SERPL-CCNC: 88 U/L (ref 39–117)
ALT SERPL W P-5'-P-CCNC: 31 U/L (ref 1–33)
ANION GAP SERPL CALCULATED.3IONS-SCNC: 11.7 MMOL/L (ref 5–15)
AST SERPL-CCNC: 36 U/L (ref 1–32)
BASOPHILS # BLD AUTO: 0.03 10*3/MM3 (ref 0–0.2)
BASOPHILS NFR BLD AUTO: 0.5 % (ref 0–1.5)
BILIRUB SERPL-MCNC: 0.2 MG/DL (ref 0–1.2)
BUN SERPL-MCNC: 15 MG/DL (ref 8–23)
BUN/CREAT SERPL: 18.1 (ref 7–25)
CALCIUM SPEC-SCNC: 9.6 MG/DL (ref 8.6–10.5)
CHLORIDE SERPL-SCNC: 103 MMOL/L (ref 98–107)
CHOLEST SERPL-MCNC: 159 MG/DL (ref 0–200)
CO2 SERPL-SCNC: 27.3 MMOL/L (ref 22–29)
CREAT SERPL-MCNC: 0.83 MG/DL (ref 0.57–1)
DEPRECATED RDW RBC AUTO: 46.7 FL (ref 37–54)
EGFRCR SERPLBLD CKD-EPI 2021: 75 ML/MIN/1.73
EOSINOPHIL # BLD AUTO: 0.07 10*3/MM3 (ref 0–0.4)
EOSINOPHIL NFR BLD AUTO: 1.2 % (ref 0.3–6.2)
ERYTHROCYTE [DISTWIDTH] IN BLOOD BY AUTOMATED COUNT: 13 % (ref 12.3–15.4)
GLOBULIN UR ELPH-MCNC: 2.7 GM/DL
GLUCOSE SERPL-MCNC: 87 MG/DL (ref 65–99)
HCT VFR BLD AUTO: 43.2 % (ref 34–46.6)
HDLC SERPL-MCNC: 63 MG/DL (ref 40–60)
HGB BLD-MCNC: 14.3 G/DL (ref 12–15.9)
IMM GRANULOCYTES # BLD AUTO: 0 10*3/MM3 (ref 0–0.05)
IMM GRANULOCYTES NFR BLD AUTO: 0 % (ref 0–0.5)
LDLC SERPL CALC-MCNC: 76 MG/DL (ref 0–100)
LDLC/HDLC SERPL: 1.17 {RATIO}
LYMPHOCYTES # BLD AUTO: 3.26 10*3/MM3 (ref 0.7–3.1)
LYMPHOCYTES NFR BLD AUTO: 54.2 % (ref 19.6–45.3)
MCH RBC QN AUTO: 32.4 PG (ref 26.6–33)
MCHC RBC AUTO-ENTMCNC: 33.1 G/DL (ref 31.5–35.7)
MCV RBC AUTO: 97.7 FL (ref 79–97)
MONOCYTES # BLD AUTO: 0.42 10*3/MM3 (ref 0.1–0.9)
MONOCYTES NFR BLD AUTO: 7 % (ref 5–12)
NEUTROPHILS NFR BLD AUTO: 2.23 10*3/MM3 (ref 1.7–7)
NEUTROPHILS NFR BLD AUTO: 37.1 % (ref 42.7–76)
NRBC BLD AUTO-RTO: 0 /100 WBC (ref 0–0.2)
PLATELET # BLD AUTO: 233 10*3/MM3 (ref 140–450)
PMV BLD AUTO: 10.3 FL (ref 6–12)
POTASSIUM SERPL-SCNC: 4.6 MMOL/L (ref 3.5–5.2)
PROT SERPL-MCNC: 6.9 G/DL (ref 6–8.5)
RBC # BLD AUTO: 4.42 10*6/MM3 (ref 3.77–5.28)
SODIUM SERPL-SCNC: 142 MMOL/L (ref 136–145)
T4 FREE SERPL-MCNC: 0.86 NG/DL (ref 0.93–1.7)
TRIGL SERPL-MCNC: 110 MG/DL (ref 0–150)
TSH SERPL DL<=0.05 MIU/L-ACNC: 1.69 UIU/ML (ref 0.27–4.2)
VLDLC SERPL-MCNC: 20 MG/DL (ref 5–40)
WBC NRBC COR # BLD: 6.01 10*3/MM3 (ref 3.4–10.8)

## 2022-05-23 PROCEDURE — 80061 LIPID PANEL: CPT | Performed by: FAMILY MEDICINE

## 2022-05-23 PROCEDURE — 85025 COMPLETE CBC W/AUTO DIFF WBC: CPT | Performed by: FAMILY MEDICINE

## 2022-05-23 PROCEDURE — 84443 ASSAY THYROID STIM HORMONE: CPT | Performed by: FAMILY MEDICINE

## 2022-05-23 PROCEDURE — 80053 COMPREHEN METABOLIC PANEL: CPT | Performed by: FAMILY MEDICINE

## 2022-05-23 PROCEDURE — 84439 ASSAY OF FREE THYROXINE: CPT | Performed by: FAMILY MEDICINE

## 2022-06-24 ENCOUNTER — OFFICE VISIT (OUTPATIENT)
Dept: PSYCHIATRY | Facility: CLINIC | Age: 72
End: 2022-06-24

## 2022-06-24 ENCOUNTER — HOSPITAL ENCOUNTER (OUTPATIENT)
Dept: CARDIOLOGY | Facility: HOSPITAL | Age: 72
Discharge: HOME | End: 2022-06-24
Admitting: STUDENT IN AN ORGANIZED HEALTH CARE EDUCATION/TRAINING PROGRAM

## 2022-06-24 VITALS
HEIGHT: 61 IN | BODY MASS INDEX: 21.9 KG/M2 | WEIGHT: 116 LBS | DIASTOLIC BLOOD PRESSURE: 98 MMHG | SYSTOLIC BLOOD PRESSURE: 122 MMHG

## 2022-06-24 DIAGNOSIS — F31.81 BIPOLAR 2 DISORDER: ICD-10-CM

## 2022-06-24 DIAGNOSIS — F31.81 BIPOLAR 2 DISORDER: Primary | ICD-10-CM

## 2022-06-24 DIAGNOSIS — F41.1 GENERALIZED ANXIETY DISORDER: ICD-10-CM

## 2022-06-24 DIAGNOSIS — F51.05 INSOMNIA DUE TO MENTAL CONDITION: ICD-10-CM

## 2022-06-24 PROCEDURE — 90833 PSYTX W PT W E/M 30 MIN: CPT | Performed by: STUDENT IN AN ORGANIZED HEALTH CARE EDUCATION/TRAINING PROGRAM

## 2022-06-24 PROCEDURE — 93005 ELECTROCARDIOGRAM TRACING: CPT | Performed by: STUDENT IN AN ORGANIZED HEALTH CARE EDUCATION/TRAINING PROGRAM

## 2022-06-24 PROCEDURE — 93010 ELECTROCARDIOGRAM REPORT: CPT | Performed by: SPECIALIST

## 2022-06-24 PROCEDURE — 99214 OFFICE O/P EST MOD 30 MIN: CPT | Performed by: STUDENT IN AN ORGANIZED HEALTH CARE EDUCATION/TRAINING PROGRAM

## 2022-06-24 RX ORDER — CETIRIZINE HYDROCHLORIDE 10 MG/1
10 TABLET ORAL DAILY PRN
COMMUNITY

## 2022-06-24 RX ORDER — BIOTIN 10 MG
1 TABLET ORAL DAILY
COMMUNITY
End: 2022-10-06

## 2022-06-25 LAB — QT INTERVAL: 437 MS

## 2022-06-27 DIAGNOSIS — F41.1 GENERALIZED ANXIETY DISORDER: ICD-10-CM

## 2022-06-27 DIAGNOSIS — F31.81 BIPOLAR 2 DISORDER: Primary | ICD-10-CM

## 2022-06-27 RX ORDER — FLUOXETINE 10 MG/1
10 CAPSULE ORAL DAILY
Qty: 30 CAPSULE | Refills: 2 | Status: SHIPPED | OUTPATIENT
Start: 2022-06-27 | End: 2022-09-02 | Stop reason: SDDI

## 2022-07-02 DIAGNOSIS — F51.05 INSOMNIA DUE TO MENTAL CONDITION: Primary | ICD-10-CM

## 2022-07-05 RX ORDER — CLONAZEPAM 1 MG/1
TABLET ORAL
Qty: 30 TABLET | Refills: 2 | Status: SHIPPED | OUTPATIENT
Start: 2022-07-05 | End: 2022-09-06 | Stop reason: SDUPTHER

## 2022-07-22 ENCOUNTER — OFFICE VISIT (OUTPATIENT)
Dept: PSYCHIATRY | Facility: CLINIC | Age: 72
End: 2022-07-22

## 2022-07-22 VITALS
DIASTOLIC BLOOD PRESSURE: 100 MMHG | HEIGHT: 62 IN | BODY MASS INDEX: 20.98 KG/M2 | SYSTOLIC BLOOD PRESSURE: 119 MMHG | WEIGHT: 114 LBS

## 2022-07-22 DIAGNOSIS — F31.81 BIPOLAR 2 DISORDER: ICD-10-CM

## 2022-07-22 DIAGNOSIS — F41.1 GENERALIZED ANXIETY DISORDER: ICD-10-CM

## 2022-07-22 DIAGNOSIS — F51.05 INSOMNIA DUE TO MENTAL CONDITION: Primary | ICD-10-CM

## 2022-07-22 PROCEDURE — 99214 OFFICE O/P EST MOD 30 MIN: CPT | Performed by: STUDENT IN AN ORGANIZED HEALTH CARE EDUCATION/TRAINING PROGRAM

## 2022-07-22 PROCEDURE — 90833 PSYTX W PT W E/M 30 MIN: CPT | Performed by: STUDENT IN AN ORGANIZED HEALTH CARE EDUCATION/TRAINING PROGRAM

## 2022-09-02 ENCOUNTER — OFFICE VISIT (OUTPATIENT)
Dept: PSYCHIATRY | Facility: CLINIC | Age: 72
End: 2022-09-02

## 2022-09-02 VITALS
DIASTOLIC BLOOD PRESSURE: 66 MMHG | WEIGHT: 112.8 LBS | BODY MASS INDEX: 20.76 KG/M2 | SYSTOLIC BLOOD PRESSURE: 156 MMHG | HEIGHT: 62 IN

## 2022-09-02 DIAGNOSIS — F51.05 INSOMNIA DUE TO MENTAL CONDITION: Primary | ICD-10-CM

## 2022-09-02 DIAGNOSIS — F31.81 BIPOLAR II DISORDER, MILD, DEPRESSED, WITH MIXED FEATURES, IN PARTIAL REMISSION: ICD-10-CM

## 2022-09-02 DIAGNOSIS — F41.1 GENERALIZED ANXIETY DISORDER: ICD-10-CM

## 2022-09-02 PROCEDURE — 99214 OFFICE O/P EST MOD 30 MIN: CPT | Performed by: STUDENT IN AN ORGANIZED HEALTH CARE EDUCATION/TRAINING PROGRAM

## 2022-09-02 PROCEDURE — 90833 PSYTX W PT W E/M 30 MIN: CPT | Performed by: STUDENT IN AN ORGANIZED HEALTH CARE EDUCATION/TRAINING PROGRAM

## 2022-09-02 RX ORDER — BUDESONIDE AND FORMOTEROL FUMARATE DIHYDRATE 80; 4.5 UG/1; UG/1
2 AEROSOL RESPIRATORY (INHALATION)
COMMUNITY

## 2022-09-06 DIAGNOSIS — F51.05 INSOMNIA DUE TO MENTAL CONDITION: ICD-10-CM

## 2022-09-06 RX ORDER — CLONAZEPAM 1 MG/1
1 TABLET ORAL NIGHTLY PRN
Qty: 30 TABLET | Refills: 2 | Status: SHIPPED | OUTPATIENT
Start: 2022-09-06 | End: 2022-11-17 | Stop reason: SDUPTHER

## 2022-09-09 ENCOUNTER — TELEPHONE (OUTPATIENT)
Dept: FAMILY MEDICINE CLINIC | Facility: CLINIC | Age: 72
End: 2022-09-09

## 2022-09-09 ENCOUNTER — TELEPHONE (OUTPATIENT)
Dept: PSYCHIATRY | Facility: CLINIC | Age: 72
End: 2022-09-09

## 2022-09-16 ENCOUNTER — OFFICE VISIT (OUTPATIENT)
Dept: PSYCHIATRY | Facility: CLINIC | Age: 72
End: 2022-09-16

## 2022-09-16 VITALS
DIASTOLIC BLOOD PRESSURE: 85 MMHG | HEIGHT: 62 IN | BODY MASS INDEX: 21.05 KG/M2 | WEIGHT: 114.4 LBS | SYSTOLIC BLOOD PRESSURE: 115 MMHG

## 2022-09-16 DIAGNOSIS — F51.05 INSOMNIA DUE TO MENTAL CONDITION: ICD-10-CM

## 2022-09-16 DIAGNOSIS — F41.1 GENERALIZED ANXIETY DISORDER: ICD-10-CM

## 2022-09-16 DIAGNOSIS — F31.81 BIPOLAR II DISORDER, MILD, DEPRESSED, WITH MIXED FEATURES, IN PARTIAL REMISSION: Primary | ICD-10-CM

## 2022-09-16 PROCEDURE — 99214 OFFICE O/P EST MOD 30 MIN: CPT | Performed by: STUDENT IN AN ORGANIZED HEALTH CARE EDUCATION/TRAINING PROGRAM

## 2022-09-16 RX ORDER — MULTIPLE VITAMINS W/ MINERALS TAB 9MG-400MCG
1 TAB ORAL DAILY
COMMUNITY

## 2022-09-16 RX ORDER — WHEAT DEXTRIN/ASPARTAME 3 G/6 G
POWDER IN PACKET (EA) ORAL
COMMUNITY

## 2022-09-16 RX ORDER — TRAZODONE HYDROCHLORIDE 100 MG/1
100 TABLET ORAL NIGHTLY
Qty: 90 TABLET | Refills: 1 | Status: SHIPPED | OUTPATIENT
Start: 2022-09-16 | End: 2023-02-14 | Stop reason: SDUPTHER

## 2022-09-22 ENCOUNTER — HOSPITAL ENCOUNTER (OUTPATIENT)
Dept: GENERAL RADIOLOGY | Facility: HOSPITAL | Age: 72
Discharge: HOME | End: 2022-09-22
Admitting: NURSE PRACTITIONER

## 2022-09-22 ENCOUNTER — OFFICE VISIT (OUTPATIENT)
Dept: FAMILY MEDICINE CLINIC | Facility: CLINIC | Age: 72
End: 2022-09-22

## 2022-09-22 VITALS
TEMPERATURE: 97.5 F | SYSTOLIC BLOOD PRESSURE: 108 MMHG | OXYGEN SATURATION: 98 % | BODY MASS INDEX: 20.97 KG/M2 | DIASTOLIC BLOOD PRESSURE: 44 MMHG | WEIGHT: 112.8 LBS | HEART RATE: 48 BPM

## 2022-09-22 DIAGNOSIS — R05.9 COUGH: ICD-10-CM

## 2022-09-22 DIAGNOSIS — Z12.31 VISIT FOR SCREENING MAMMOGRAM: ICD-10-CM

## 2022-09-22 DIAGNOSIS — R00.1 BRADYCARDIA: ICD-10-CM

## 2022-09-22 DIAGNOSIS — N30.01 ACUTE CYSTITIS WITH HEMATURIA: ICD-10-CM

## 2022-09-22 DIAGNOSIS — R30.0 DYSURIA: ICD-10-CM

## 2022-09-22 DIAGNOSIS — F51.01 PRIMARY INSOMNIA: Primary | ICD-10-CM

## 2022-09-22 PROCEDURE — 81003 URINALYSIS AUTO W/O SCOPE: CPT | Performed by: NURSE PRACTITIONER

## 2022-09-22 PROCEDURE — 87086 URINE CULTURE/COLONY COUNT: CPT | Performed by: NURSE PRACTITIONER

## 2022-09-22 PROCEDURE — 93000 ELECTROCARDIOGRAM COMPLETE: CPT | Performed by: NURSE PRACTITIONER

## 2022-09-22 PROCEDURE — 71046 X-RAY EXAM CHEST 2 VIEWS: CPT

## 2022-09-22 PROCEDURE — 99214 OFFICE O/P EST MOD 30 MIN: CPT | Performed by: NURSE PRACTITIONER

## 2022-09-22 RX ORDER — METOPROLOL SUCCINATE 50 MG/1
25 TABLET, EXTENDED RELEASE ORAL DAILY
Qty: 45 TABLET | Refills: 3
Start: 2022-09-22

## 2022-09-22 RX ORDER — NITROFURANTOIN 25; 75 MG/1; MG/1
100 CAPSULE ORAL 2 TIMES DAILY
Qty: 14 CAPSULE | Refills: 0 | Status: SHIPPED | OUTPATIENT
Start: 2022-09-22 | End: 2022-09-29

## 2022-09-23 LAB — BACTERIA SPEC AEROBE CULT: NORMAL

## 2022-09-27 ENCOUNTER — TELEPHONE (OUTPATIENT)
Dept: FAMILY MEDICINE CLINIC | Facility: CLINIC | Age: 72
End: 2022-09-27

## 2022-09-27 LAB
BILIRUB BLD-MCNC: NEGATIVE MG/DL
CLARITY, POC: ABNORMAL
COLOR UR: ABNORMAL
EXPIRATION DATE: ABNORMAL
GLUCOSE UR STRIP-MCNC: NEGATIVE MG/DL
KETONES UR QL: NEGATIVE
LEUKOCYTE EST, POC: ABNORMAL
Lab: ABNORMAL
NITRITE UR-MCNC: NEGATIVE MG/ML
PH UR: 6 [PH] (ref 5–8)
PROT UR STRIP-MCNC: ABNORMAL MG/DL
RBC # UR STRIP: ABNORMAL /UL
SP GR UR: 1.03 (ref 1–1.03)
UROBILINOGEN UR QL: ABNORMAL

## 2022-10-05 ENCOUNTER — OFFICE VISIT (OUTPATIENT)
Dept: FAMILY MEDICINE CLINIC | Facility: CLINIC | Age: 72
End: 2022-10-05

## 2022-10-05 VITALS
BODY MASS INDEX: 20.67 KG/M2 | TEMPERATURE: 97.9 F | HEART RATE: 50 BPM | OXYGEN SATURATION: 97 % | WEIGHT: 111.2 LBS | DIASTOLIC BLOOD PRESSURE: 57 MMHG | SYSTOLIC BLOOD PRESSURE: 155 MMHG

## 2022-10-05 DIAGNOSIS — R31.21 ASYMPTOMATIC MICROSCOPIC HEMATURIA: ICD-10-CM

## 2022-10-05 DIAGNOSIS — R41.3 MEMORY LOSS: ICD-10-CM

## 2022-10-05 DIAGNOSIS — R30.0 DYSURIA: ICD-10-CM

## 2022-10-05 DIAGNOSIS — E78.5 HYPERLIPIDEMIA, UNSPECIFIED HYPERLIPIDEMIA TYPE: Primary | ICD-10-CM

## 2022-10-05 DIAGNOSIS — Z23 NEED FOR INFLUENZA VACCINATION: ICD-10-CM

## 2022-10-05 LAB
BILIRUB BLD-MCNC: NEGATIVE MG/DL
CLARITY, POC: CLEAR
COLOR UR: YELLOW
EXPIRATION DATE: ABNORMAL
GLUCOSE UR STRIP-MCNC: NEGATIVE MG/DL
KETONES UR QL: NEGATIVE
LEUKOCYTE EST, POC: NEGATIVE
Lab: ABNORMAL
NITRITE UR-MCNC: NEGATIVE MG/ML
PH UR: 6 [PH] (ref 5–8)
PROT UR STRIP-MCNC: NEGATIVE MG/DL
RBC # UR STRIP: ABNORMAL /UL
SP GR UR: 1.02 (ref 1–1.03)
UROBILINOGEN UR QL: ABNORMAL

## 2022-10-05 PROCEDURE — 81003 URINALYSIS AUTO W/O SCOPE: CPT | Performed by: NURSE PRACTITIONER

## 2022-10-05 PROCEDURE — 90662 IIV NO PRSV INCREASED AG IM: CPT | Performed by: NURSE PRACTITIONER

## 2022-10-05 PROCEDURE — G0008 ADMIN INFLUENZA VIRUS VAC: HCPCS | Performed by: NURSE PRACTITIONER

## 2022-10-05 PROCEDURE — 87086 URINE CULTURE/COLONY COUNT: CPT | Performed by: NURSE PRACTITIONER

## 2022-10-05 PROCEDURE — 99214 OFFICE O/P EST MOD 30 MIN: CPT | Performed by: NURSE PRACTITIONER

## 2022-10-05 RX ORDER — ROSUVASTATIN CALCIUM 5 MG/1
5 TABLET, COATED ORAL DAILY
Qty: 90 TABLET | Refills: 1 | Status: SHIPPED | OUTPATIENT
Start: 2022-10-05 | End: 2022-11-18

## 2022-10-06 ENCOUNTER — OFFICE VISIT (OUTPATIENT)
Dept: PSYCHIATRY | Facility: CLINIC | Age: 72
End: 2022-10-06

## 2022-10-06 VITALS
DIASTOLIC BLOOD PRESSURE: 71 MMHG | WEIGHT: 111 LBS | HEIGHT: 62 IN | BODY MASS INDEX: 20.43 KG/M2 | SYSTOLIC BLOOD PRESSURE: 133 MMHG

## 2022-10-06 DIAGNOSIS — F51.05 INSOMNIA DUE TO MENTAL CONDITION: ICD-10-CM

## 2022-10-06 DIAGNOSIS — F41.1 GENERALIZED ANXIETY DISORDER: ICD-10-CM

## 2022-10-06 DIAGNOSIS — F31.81 BIPOLAR II DISORDER, MILD, DEPRESSED, WITH MIXED FEATURES, IN PARTIAL REMISSION: Primary | ICD-10-CM

## 2022-10-06 LAB — BACTERIA SPEC AEROBE CULT: NO GROWTH

## 2022-10-06 PROCEDURE — 99214 OFFICE O/P EST MOD 30 MIN: CPT | Performed by: STUDENT IN AN ORGANIZED HEALTH CARE EDUCATION/TRAINING PROGRAM

## 2022-10-06 PROCEDURE — 90833 PSYTX W PT W E/M 30 MIN: CPT | Performed by: STUDENT IN AN ORGANIZED HEALTH CARE EDUCATION/TRAINING PROGRAM

## 2022-11-15 ENCOUNTER — TELEPHONE (OUTPATIENT)
Dept: PSYCHIATRY | Facility: CLINIC | Age: 72
End: 2022-11-15

## 2022-11-17 ENCOUNTER — TELEMEDICINE (OUTPATIENT)
Dept: PSYCHIATRY | Facility: CLINIC | Age: 72
End: 2022-11-17

## 2022-11-17 DIAGNOSIS — F51.05 INSOMNIA DUE TO MENTAL CONDITION: ICD-10-CM

## 2022-11-17 DIAGNOSIS — F31.81 BIPOLAR II DISORDER, MILD, DEPRESSED, WITH MIXED FEATURES, IN PARTIAL REMISSION: Primary | ICD-10-CM

## 2022-11-17 DIAGNOSIS — F41.1 GENERALIZED ANXIETY DISORDER: ICD-10-CM

## 2022-11-17 PROCEDURE — 90833 PSYTX W PT W E/M 30 MIN: CPT | Performed by: STUDENT IN AN ORGANIZED HEALTH CARE EDUCATION/TRAINING PROGRAM

## 2022-11-17 PROCEDURE — 99214 OFFICE O/P EST MOD 30 MIN: CPT | Performed by: STUDENT IN AN ORGANIZED HEALTH CARE EDUCATION/TRAINING PROGRAM

## 2022-11-17 RX ORDER — PHENOL 1.4 %
10 AEROSOL, SPRAY (ML) MUCOUS MEMBRANE NIGHTLY
COMMUNITY

## 2022-11-17 RX ORDER — CLONAZEPAM 1 MG/1
1 TABLET ORAL NIGHTLY PRN
Qty: 30 TABLET | Refills: 2 | Status: SHIPPED | OUTPATIENT
Start: 2022-11-17

## 2022-11-18 ENCOUNTER — OFFICE VISIT (OUTPATIENT)
Dept: FAMILY MEDICINE CLINIC | Facility: CLINIC | Age: 72
End: 2022-11-18

## 2022-11-18 ENCOUNTER — TELEPHONE (OUTPATIENT)
Dept: FAMILY MEDICINE CLINIC | Facility: CLINIC | Age: 72
End: 2022-11-18

## 2022-11-18 ENCOUNTER — DOCUMENTATION (OUTPATIENT)
Dept: PSYCHIATRY | Facility: CLINIC | Age: 72
End: 2022-11-18

## 2022-11-18 ENCOUNTER — TELEPHONE (OUTPATIENT)
Dept: PSYCHIATRY | Facility: CLINIC | Age: 72
End: 2022-11-18

## 2022-11-18 VITALS
WEIGHT: 106.6 LBS | SYSTOLIC BLOOD PRESSURE: 186 MMHG | HEIGHT: 62 IN | HEART RATE: 68 BPM | DIASTOLIC BLOOD PRESSURE: 80 MMHG | TEMPERATURE: 98.3 F | OXYGEN SATURATION: 98 % | BODY MASS INDEX: 19.62 KG/M2

## 2022-11-18 DIAGNOSIS — I10 PRIMARY HYPERTENSION: Primary | ICD-10-CM

## 2022-11-18 PROBLEM — R03.0 ELEVATED BLOOD PRESSURE READING IN OFFICE WITHOUT DIAGNOSIS OF HYPERTENSION: Status: RESOLVED | Noted: 2021-11-18 | Resolved: 2022-11-18

## 2022-11-18 PROCEDURE — 99213 OFFICE O/P EST LOW 20 MIN: CPT | Performed by: FAMILY MEDICINE

## 2022-11-21 ENCOUNTER — OFFICE VISIT (OUTPATIENT)
Dept: FAMILY MEDICINE CLINIC | Facility: CLINIC | Age: 72
End: 2022-11-21

## 2022-11-21 ENCOUNTER — LAB (OUTPATIENT)
Dept: LAB | Facility: HOSPITAL | Age: 72
End: 2022-11-21

## 2022-11-21 VITALS
OXYGEN SATURATION: 100 % | WEIGHT: 108.6 LBS | BODY MASS INDEX: 19.98 KG/M2 | TEMPERATURE: 97.8 F | SYSTOLIC BLOOD PRESSURE: 143 MMHG | HEART RATE: 56 BPM | DIASTOLIC BLOOD PRESSURE: 62 MMHG | HEIGHT: 62 IN

## 2022-11-21 DIAGNOSIS — E78.5 HYPERLIPIDEMIA, UNSPECIFIED HYPERLIPIDEMIA TYPE: ICD-10-CM

## 2022-11-21 DIAGNOSIS — J43.9 PULMONARY EMPHYSEMA, UNSPECIFIED EMPHYSEMA TYPE: ICD-10-CM

## 2022-11-21 DIAGNOSIS — F41.9 ANXIETY: Primary | ICD-10-CM

## 2022-11-21 DIAGNOSIS — I10 PRIMARY HYPERTENSION: ICD-10-CM

## 2022-11-21 LAB
ALBUMIN SERPL-MCNC: 4 G/DL (ref 3.5–5.2)
ALBUMIN/GLOB SERPL: 1.3 G/DL
ALP SERPL-CCNC: 70 U/L (ref 39–117)
ALT SERPL W P-5'-P-CCNC: 15 U/L (ref 1–33)
ANION GAP SERPL CALCULATED.3IONS-SCNC: 8.4 MMOL/L (ref 5–15)
AST SERPL-CCNC: 23 U/L (ref 1–32)
BASOPHILS # BLD AUTO: 0.04 10*3/MM3 (ref 0–0.2)
BASOPHILS NFR BLD AUTO: 0.7 % (ref 0–1.5)
BILIRUB SERPL-MCNC: 0.2 MG/DL (ref 0–1.2)
BUN SERPL-MCNC: 9 MG/DL (ref 8–23)
BUN/CREAT SERPL: 11.3 (ref 7–25)
CALCIUM SPEC-SCNC: 9.5 MG/DL (ref 8.6–10.5)
CHLORIDE SERPL-SCNC: 103 MMOL/L (ref 98–107)
CHOLEST SERPL-MCNC: 202 MG/DL (ref 0–200)
CO2 SERPL-SCNC: 29.6 MMOL/L (ref 22–29)
CREAT SERPL-MCNC: 0.8 MG/DL (ref 0.57–1)
DEPRECATED RDW RBC AUTO: 46.2 FL (ref 37–54)
EGFRCR SERPLBLD CKD-EPI 2021: 77.9 ML/MIN/1.73
EOSINOPHIL # BLD AUTO: 0.05 10*3/MM3 (ref 0–0.4)
EOSINOPHIL NFR BLD AUTO: 0.9 % (ref 0.3–6.2)
ERYTHROCYTE [DISTWIDTH] IN BLOOD BY AUTOMATED COUNT: 13.1 % (ref 12.3–15.4)
GLOBULIN UR ELPH-MCNC: 3.1 GM/DL
GLUCOSE SERPL-MCNC: 95 MG/DL (ref 65–99)
HCT VFR BLD AUTO: 44 % (ref 34–46.6)
HDLC SERPL-MCNC: 61 MG/DL (ref 40–60)
HGB BLD-MCNC: 14.3 G/DL (ref 12–15.9)
IMM GRANULOCYTES # BLD AUTO: 0.01 10*3/MM3 (ref 0–0.05)
IMM GRANULOCYTES NFR BLD AUTO: 0.2 % (ref 0–0.5)
LDLC SERPL CALC-MCNC: 115 MG/DL (ref 0–100)
LDLC/HDLC SERPL: 1.83 {RATIO}
LYMPHOCYTES # BLD AUTO: 3.14 10*3/MM3 (ref 0.7–3.1)
LYMPHOCYTES NFR BLD AUTO: 54.6 % (ref 19.6–45.3)
MCH RBC QN AUTO: 31.4 PG (ref 26.6–33)
MCHC RBC AUTO-ENTMCNC: 32.5 G/DL (ref 31.5–35.7)
MCV RBC AUTO: 96.5 FL (ref 79–97)
MONOCYTES # BLD AUTO: 0.46 10*3/MM3 (ref 0.1–0.9)
MONOCYTES NFR BLD AUTO: 8 % (ref 5–12)
NEUTROPHILS NFR BLD AUTO: 2.05 10*3/MM3 (ref 1.7–7)
NEUTROPHILS NFR BLD AUTO: 35.6 % (ref 42.7–76)
NRBC BLD AUTO-RTO: 0 /100 WBC (ref 0–0.2)
PLATELET # BLD AUTO: 213 10*3/MM3 (ref 140–450)
PMV BLD AUTO: 10.7 FL (ref 6–12)
POTASSIUM SERPL-SCNC: 4 MMOL/L (ref 3.5–5.2)
PROT SERPL-MCNC: 7.1 G/DL (ref 6–8.5)
RBC # BLD AUTO: 4.56 10*6/MM3 (ref 3.77–5.28)
SODIUM SERPL-SCNC: 141 MMOL/L (ref 136–145)
TRIGL SERPL-MCNC: 147 MG/DL (ref 0–150)
VLDLC SERPL-MCNC: 26 MG/DL (ref 5–40)
WBC NRBC COR # BLD: 5.75 10*3/MM3 (ref 3.4–10.8)

## 2022-11-21 PROCEDURE — 85025 COMPLETE CBC W/AUTO DIFF WBC: CPT | Performed by: FAMILY MEDICINE

## 2022-11-21 PROCEDURE — 80061 LIPID PANEL: CPT | Performed by: FAMILY MEDICINE

## 2022-11-21 PROCEDURE — 80053 COMPREHEN METABOLIC PANEL: CPT | Performed by: FAMILY MEDICINE

## 2022-11-21 PROCEDURE — 99214 OFFICE O/P EST MOD 30 MIN: CPT | Performed by: FAMILY MEDICINE

## 2022-12-12 ENCOUNTER — HOSPITAL ENCOUNTER (OUTPATIENT)
Dept: MAMMOGRAPHY | Facility: HOSPITAL | Age: 72
Discharge: HOME | End: 2022-12-12
Admitting: NURSE PRACTITIONER

## 2022-12-12 DIAGNOSIS — Z12.31 VISIT FOR SCREENING MAMMOGRAM: ICD-10-CM

## 2022-12-12 PROCEDURE — 77067 SCR MAMMO BI INCL CAD: CPT

## 2022-12-12 PROCEDURE — 77063 BREAST TOMOSYNTHESIS BI: CPT

## 2022-12-30 ENCOUNTER — TELEPHONE (OUTPATIENT)
Dept: FAMILY MEDICINE CLINIC | Facility: CLINIC | Age: 72
End: 2022-12-30

## 2023-01-20 ENCOUNTER — TRANSCRIBE ORDERS (OUTPATIENT)
Dept: ADMINISTRATIVE | Facility: HOSPITAL | Age: 73
End: 2023-01-20
Payer: MEDICARE

## 2023-01-20 DIAGNOSIS — R11.0 NAUSEA: Primary | ICD-10-CM

## 2023-01-20 DIAGNOSIS — K21.9 CHRONIC GERD: ICD-10-CM

## 2023-02-14 ENCOUNTER — OFFICE VISIT (OUTPATIENT)
Dept: PSYCHIATRY | Facility: CLINIC | Age: 73
End: 2023-02-14
Payer: MEDICARE

## 2023-02-14 VITALS
HEIGHT: 62 IN | DIASTOLIC BLOOD PRESSURE: 97 MMHG | BODY MASS INDEX: 19.1 KG/M2 | SYSTOLIC BLOOD PRESSURE: 176 MMHG | HEART RATE: 74 BPM | WEIGHT: 103.8 LBS

## 2023-02-14 DIAGNOSIS — F41.1 GENERALIZED ANXIETY DISORDER: ICD-10-CM

## 2023-02-14 DIAGNOSIS — F51.05 INSOMNIA DUE TO MENTAL CONDITION: Primary | ICD-10-CM

## 2023-02-14 DIAGNOSIS — F31.81 BIPOLAR II DISORDER, MILD, DEPRESSED, WITH MIXED FEATURES, IN PARTIAL REMISSION: ICD-10-CM

## 2023-02-14 PROCEDURE — 99214 OFFICE O/P EST MOD 30 MIN: CPT | Performed by: STUDENT IN AN ORGANIZED HEALTH CARE EDUCATION/TRAINING PROGRAM

## 2023-02-14 PROCEDURE — 90833 PSYTX W PT W E/M 30 MIN: CPT | Performed by: STUDENT IN AN ORGANIZED HEALTH CARE EDUCATION/TRAINING PROGRAM

## 2023-02-14 RX ORDER — SUVOREXANT 5 MG/1
5 TABLET, FILM COATED ORAL NIGHTLY
Qty: 30 TABLET | Refills: 2 | Status: SHIPPED | OUTPATIENT
Start: 2023-02-14

## 2023-02-14 RX ORDER — TRAZODONE HYDROCHLORIDE 50 MG/1
100 TABLET ORAL NIGHTLY
Qty: 60 TABLET | Refills: 5 | Status: SHIPPED | OUTPATIENT
Start: 2023-02-14

## 2023-02-14 RX ORDER — DOCUSATE SODIUM 100 MG/1
100 CAPSULE, LIQUID FILLED ORAL 3 TIMES DAILY
COMMUNITY

## 2023-02-23 ENCOUNTER — TELEPHONE (OUTPATIENT)
Dept: PSYCHIATRY | Facility: CLINIC | Age: 73
End: 2023-02-23
Payer: MEDICARE

## 2023-03-02 ENCOUNTER — PRIOR AUTHORIZATION (OUTPATIENT)
Dept: PSYCHIATRY | Facility: CLINIC | Age: 73
End: 2023-03-02
Payer: MEDICARE

## 2023-03-06 ENCOUNTER — HOSPITAL ENCOUNTER (OUTPATIENT)
Dept: GENERAL RADIOLOGY | Facility: HOSPITAL | Age: 73
Discharge: HOME | End: 2023-03-06
Payer: MEDICARE

## 2023-04-03 ENCOUNTER — LAB (OUTPATIENT)
Dept: LAB | Facility: HOSPITAL | Age: 73
End: 2023-04-03
Payer: MEDICARE

## 2023-04-03 ENCOUNTER — TRANSCRIBE ORDERS (OUTPATIENT)
Dept: LAB | Facility: HOSPITAL | Age: 73
End: 2023-04-03
Payer: MEDICARE

## 2023-04-03 DIAGNOSIS — M06.4 INFLAMMATORY POLYARTHROPATHY: ICD-10-CM

## 2023-04-03 DIAGNOSIS — E55.9 VITAMIN D DEFICIENCY: ICD-10-CM

## 2023-04-03 DIAGNOSIS — R53.83 TIREDNESS: ICD-10-CM

## 2023-04-03 DIAGNOSIS — D64.9 ANEMIA, UNSPECIFIED TYPE: Primary | ICD-10-CM

## 2023-04-03 DIAGNOSIS — E78.5 HYPERLIPIDEMIA, UNSPECIFIED HYPERLIPIDEMIA TYPE: ICD-10-CM

## 2023-04-03 DIAGNOSIS — D64.9 ANEMIA, UNSPECIFIED TYPE: ICD-10-CM

## 2023-04-03 LAB
25(OH)D3 SERPL-MCNC: 46.6 NG/ML (ref 30–100)
ALBUMIN SERPL-MCNC: 4.6 G/DL (ref 3.5–5.2)
ALBUMIN/GLOB SERPL: 1.4 G/DL
ALP SERPL-CCNC: 73 U/L (ref 39–117)
ALT SERPL W P-5'-P-CCNC: 16 U/L (ref 1–33)
ANION GAP SERPL CALCULATED.3IONS-SCNC: 9.4 MMOL/L (ref 5–15)
AST SERPL-CCNC: 25 U/L (ref 1–32)
BASOPHILS # BLD AUTO: 0.04 10*3/MM3 (ref 0–0.2)
BASOPHILS NFR BLD AUTO: 0.7 % (ref 0–1.5)
BILIRUB SERPL-MCNC: 0.3 MG/DL (ref 0–1.2)
BUN SERPL-MCNC: 8 MG/DL (ref 8–23)
BUN/CREAT SERPL: 8.4 (ref 7–25)
CALCIUM SPEC-SCNC: 10.3 MG/DL (ref 8.6–10.5)
CHLORIDE SERPL-SCNC: 102 MMOL/L (ref 98–107)
CHOLEST SERPL-MCNC: 233 MG/DL (ref 0–200)
CO2 SERPL-SCNC: 30.6 MMOL/L (ref 22–29)
CREAT SERPL-MCNC: 0.95 MG/DL (ref 0.57–1)
DEPRECATED RDW RBC AUTO: 43.2 FL (ref 37–54)
EGFRCR SERPLBLD CKD-EPI 2021: 63.4 ML/MIN/1.73
EOSINOPHIL # BLD AUTO: 0.06 10*3/MM3 (ref 0–0.4)
EOSINOPHIL NFR BLD AUTO: 1 % (ref 0.3–6.2)
ERYTHROCYTE [DISTWIDTH] IN BLOOD BY AUTOMATED COUNT: 12.5 % (ref 12.3–15.4)
FERRITIN SERPL-MCNC: 185 NG/ML (ref 13–150)
FOLATE SERPL-MCNC: >20 NG/ML (ref 4.78–24.2)
GLOBULIN UR ELPH-MCNC: 3.2 GM/DL
GLUCOSE SERPL-MCNC: 101 MG/DL (ref 65–99)
HCT VFR BLD AUTO: 44.3 % (ref 34–46.6)
HDLC SERPL-MCNC: 70 MG/DL (ref 40–60)
HGB BLD-MCNC: 14.9 G/DL (ref 12–15.9)
IRON 24H UR-MRATE: 133 MCG/DL (ref 37–145)
IRON SATN MFR SERPL: 34 % (ref 20–50)
LDLC SERPL CALC-MCNC: 141 MG/DL (ref 0–100)
LDLC/HDLC SERPL: 1.98 {RATIO}
LYMPHOCYTES # BLD AUTO: 3.42 10*3/MM3 (ref 0.7–3.1)
LYMPHOCYTES NFR BLD AUTO: 58.1 % (ref 19.6–45.3)
MCH RBC QN AUTO: 32 PG (ref 26.6–33)
MCHC RBC AUTO-ENTMCNC: 33.6 G/DL (ref 31.5–35.7)
MCV RBC AUTO: 95.3 FL (ref 79–97)
MONOCYTES # BLD AUTO: 0.44 10*3/MM3 (ref 0.1–0.9)
MONOCYTES NFR BLD AUTO: 7.5 % (ref 5–12)
NEUTROPHILS NFR BLD AUTO: 1.93 10*3/MM3 (ref 1.7–7)
NEUTROPHILS NFR BLD AUTO: 32.7 % (ref 42.7–76)
PLATELET # BLD AUTO: 213 10*3/MM3 (ref 140–450)
PMV BLD AUTO: 10.8 FL (ref 6–12)
POTASSIUM SERPL-SCNC: 4.5 MMOL/L (ref 3.5–5.2)
PROT SERPL-MCNC: 7.8 G/DL (ref 6–8.5)
RBC # BLD AUTO: 4.65 10*6/MM3 (ref 3.77–5.28)
SODIUM SERPL-SCNC: 142 MMOL/L (ref 136–145)
TIBC SERPL-MCNC: 392 MCG/DL (ref 298–536)
TRANSFERRIN SERPL-MCNC: 263 MG/DL (ref 200–360)
TRIGL SERPL-MCNC: 123 MG/DL (ref 0–150)
TSH SERPL DL<=0.05 MIU/L-ACNC: 1.27 UIU/ML (ref 0.27–4.2)
VIT B12 BLD-MCNC: 907 PG/ML (ref 211–946)
VLDLC SERPL-MCNC: 22 MG/DL (ref 5–40)
WBC NRBC COR # BLD: 5.89 10*3/MM3 (ref 3.4–10.8)

## 2023-04-03 PROCEDURE — 80053 COMPREHEN METABOLIC PANEL: CPT

## 2023-04-03 PROCEDURE — 82306 VITAMIN D 25 HYDROXY: CPT

## 2023-04-03 PROCEDURE — 84443 ASSAY THYROID STIM HORMONE: CPT

## 2023-04-03 PROCEDURE — 80061 LIPID PANEL: CPT

## 2023-04-03 PROCEDURE — 82746 ASSAY OF FOLIC ACID SERUM: CPT

## 2023-04-03 PROCEDURE — 85025 COMPLETE CBC W/AUTO DIFF WBC: CPT

## 2023-04-03 PROCEDURE — 84466 ASSAY OF TRANSFERRIN: CPT

## 2023-04-03 PROCEDURE — 36415 COLL VENOUS BLD VENIPUNCTURE: CPT

## 2023-04-03 PROCEDURE — 82728 ASSAY OF FERRITIN: CPT

## 2023-04-03 PROCEDURE — 83540 ASSAY OF IRON: CPT

## 2023-04-03 PROCEDURE — 82607 VITAMIN B-12: CPT

## 2023-04-21 ENCOUNTER — TELEPHONE (OUTPATIENT)
Dept: FAMILY MEDICINE CLINIC | Facility: CLINIC | Age: 73
End: 2023-04-21
Payer: MEDICARE

## 2023-04-21 ENCOUNTER — HOSPITAL ENCOUNTER (OUTPATIENT)
Dept: GENERAL RADIOLOGY | Facility: HOSPITAL | Age: 73
Discharge: HOME | End: 2023-04-21
Payer: MEDICARE

## 2023-04-21 DIAGNOSIS — R11.0 NAUSEA: ICD-10-CM

## 2023-04-21 DIAGNOSIS — K21.9 CHRONIC GERD: ICD-10-CM

## 2023-04-21 PROCEDURE — 63710000001 BARIUM SULFATE 98 % RECONSTITUTED SUSPENSION: Performed by: INTERNAL MEDICINE

## 2023-04-21 PROCEDURE — 63710000001 BARIUM SULFATE 700 MG TABLET: Performed by: INTERNAL MEDICINE

## 2023-04-21 PROCEDURE — 63710000001 SOD BICARB-CITRIC ACID-SIMETHICONE 2.21-1.53-0.04 G PACK: Performed by: INTERNAL MEDICINE

## 2023-04-21 PROCEDURE — A9270 NON-COVERED ITEM OR SERVICE: HCPCS | Performed by: INTERNAL MEDICINE

## 2023-04-21 PROCEDURE — 74221 X-RAY XM ESOPHAGUS 2CNTRST: CPT

## 2023-04-21 PROCEDURE — 63710000001 BARIUM SULFATE 60 % SUSPENSION: Performed by: INTERNAL MEDICINE

## 2023-04-21 RX ADMIN — BARIUM SULFATE 135 ML: 980 POWDER, FOR SUSPENSION ORAL at 08:40

## 2023-04-21 RX ADMIN — BARIUM SULFATE 700 MG: 700 TABLET ORAL at 08:51

## 2023-04-21 RX ADMIN — BARIUM SULFATE 355 ML: 0.6 SUSPENSION ORAL at 08:41

## 2023-04-21 RX ADMIN — ANTACID/ANTIFLATULENT 1 PACKET: 380; 550; 10; 10 GRANULE, EFFERVESCENT ORAL at 08:40

## 2023-04-26 ENCOUNTER — TRANSCRIBE ORDERS (OUTPATIENT)
Dept: ADMINISTRATIVE | Facility: HOSPITAL | Age: 73
End: 2023-04-26
Payer: MEDICARE

## 2023-04-26 DIAGNOSIS — R47.02 DYSPHASIA: Primary | ICD-10-CM

## 2023-06-05 DIAGNOSIS — F51.05 INSOMNIA DUE TO MENTAL CONDITION: ICD-10-CM

## 2023-06-05 RX ORDER — CLONAZEPAM 1 MG/1
1 TABLET ORAL NIGHTLY PRN
Qty: 30 TABLET | Refills: 2 | OUTPATIENT
Start: 2023-06-05

## 2023-06-06 RX ORDER — CLONAZEPAM 1 MG/1
1 TABLET ORAL NIGHTLY PRN
Qty: 30 TABLET | Refills: 5 | Status: SHIPPED | OUTPATIENT
Start: 2023-06-06

## 2023-06-07 PROBLEM — F41.9 ANXIETY: Status: RESOLVED | Noted: 2020-01-04 | Resolved: 2023-06-07

## 2023-06-07 PROBLEM — M79.606 LEG PAIN: Status: RESOLVED | Noted: 2021-08-09 | Resolved: 2023-06-07

## 2023-06-07 PROBLEM — K46.9 ABDOMINAL HERNIA: Status: RESOLVED | Noted: 2021-08-09 | Resolved: 2023-06-07

## 2023-06-07 PROBLEM — M51.36 DEGENERATION OF LUMBAR INTERVERTEBRAL DISC: Status: RESOLVED | Noted: 2019-01-08 | Resolved: 2023-06-07

## 2023-06-07 PROBLEM — J30.9 ALLERGIC RHINITIS: Status: RESOLVED | Noted: 2021-08-09 | Resolved: 2023-06-07

## 2023-06-07 PROBLEM — K64.9 HEMORRHOID: Status: RESOLVED | Noted: 2021-08-09 | Resolved: 2023-06-07

## 2023-06-07 PROBLEM — Z23 NEED FOR INFLUENZA VACCINATION: Status: RESOLVED | Noted: 2021-11-18 | Resolved: 2023-06-07

## 2023-06-07 PROBLEM — K21.9 ESOPHAGEAL REFLUX: Status: RESOLVED | Noted: 2021-08-09 | Resolved: 2023-06-07

## 2023-06-07 PROBLEM — M79.89 LIMB SWELLING: Status: RESOLVED | Noted: 2021-08-09 | Resolved: 2023-06-07

## 2023-06-07 PROBLEM — D72.820 LYMPHOCYTOSIS: Status: ACTIVE | Noted: 2023-06-07

## 2023-06-07 PROBLEM — M19.90 OSTEOARTHRITIS: Status: RESOLVED | Noted: 2020-01-04 | Resolved: 2023-06-07

## 2023-06-07 PROBLEM — M54.2 NECK PAIN: Status: RESOLVED | Noted: 2019-01-08 | Resolved: 2023-06-07

## 2023-06-07 PROBLEM — F51.01 PRIMARY INSOMNIA: Status: ACTIVE | Noted: 2020-01-04

## 2023-06-07 PROBLEM — J43.1 PANLOBULAR EMPHYSEMA: Status: ACTIVE | Noted: 2021-08-09

## 2023-06-07 PROBLEM — E78.2 MIXED HYPERLIPIDEMIA: Status: ACTIVE | Noted: 2021-08-09

## 2023-06-07 PROBLEM — J45.909 ASTHMA: Status: RESOLVED | Noted: 2021-08-09 | Resolved: 2023-06-07

## 2023-06-07 PROBLEM — M51.369 DEGENERATION OF LUMBAR INTERVERTEBRAL DISC: Status: RESOLVED | Noted: 2019-01-08 | Resolved: 2023-06-07

## 2023-06-07 PROBLEM — IMO0002 DISPLACEMENT OF INTERVERTEBRAL DISC WITHOUT MYELOPATHY: Status: RESOLVED | Noted: 2021-08-09 | Resolved: 2023-06-07

## 2023-06-07 PROBLEM — M54.50 LOW BACK PAIN: Status: RESOLVED | Noted: 2021-08-09 | Resolved: 2023-06-07

## 2023-06-08 ENCOUNTER — TELEPHONE (OUTPATIENT)
Dept: PEDIATRICS | Facility: OTHER | Age: 73
End: 2023-06-08
Payer: MEDICARE

## 2023-06-08 ENCOUNTER — OFFICE VISIT (OUTPATIENT)
Dept: INTERNAL MEDICINE | Facility: CLINIC | Age: 73
End: 2023-06-08
Payer: MEDICARE

## 2023-06-08 VITALS
WEIGHT: 102 LBS | HEIGHT: 61 IN | DIASTOLIC BLOOD PRESSURE: 84 MMHG | OXYGEN SATURATION: 100 % | BODY MASS INDEX: 19.26 KG/M2 | TEMPERATURE: 98.2 F | HEART RATE: 64 BPM | SYSTOLIC BLOOD PRESSURE: 132 MMHG

## 2023-06-08 DIAGNOSIS — Z78.0 POSTMENOPAUSE: ICD-10-CM

## 2023-06-08 DIAGNOSIS — R30.0 DYSURIA: Primary | ICD-10-CM

## 2023-06-08 DIAGNOSIS — E78.2 MIXED HYPERLIPIDEMIA: ICD-10-CM

## 2023-06-08 DIAGNOSIS — D72.820 LYMPHOCYTOSIS: ICD-10-CM

## 2023-06-08 DIAGNOSIS — Z12.11 COLON CANCER SCREENING: ICD-10-CM

## 2023-06-08 DIAGNOSIS — Z00.00 WELL ADULT EXAM: ICD-10-CM

## 2023-06-08 DIAGNOSIS — F51.01 PRIMARY INSOMNIA: ICD-10-CM

## 2023-06-08 DIAGNOSIS — I73.9 CLAUDICATION: ICD-10-CM

## 2023-06-08 DIAGNOSIS — I10 PRIMARY HYPERTENSION: ICD-10-CM

## 2023-06-08 DIAGNOSIS — J43.1 PANLOBULAR EMPHYSEMA: ICD-10-CM

## 2023-06-08 LAB
BILIRUB BLD-MCNC: NEGATIVE MG/DL
CLARITY, POC: ABNORMAL
COLOR UR: YELLOW
EXPIRATION DATE: ABNORMAL
GLUCOSE UR STRIP-MCNC: NEGATIVE MG/DL
KETONES UR QL: NEGATIVE
LEUKOCYTE EST, POC: NEGATIVE
Lab: ABNORMAL
NITRITE UR-MCNC: NEGATIVE MG/ML
PH UR: 6 [PH] (ref 5–8)
PROT UR STRIP-MCNC: NEGATIVE MG/DL
RBC # UR STRIP: NEGATIVE /UL
SP GR UR: 1.01 (ref 1–1.03)
UROBILINOGEN UR QL: NORMAL

## 2023-06-08 RX ORDER — BUDESONIDE AND FORMOTEROL FUMARATE DIHYDRATE 80; 4.5 UG/1; UG/1
2 AEROSOL RESPIRATORY (INHALATION)
Qty: 1 EACH | Refills: 12 | Status: SHIPPED | OUTPATIENT
Start: 2023-06-08 | End: 2023-06-09 | Stop reason: SDUPTHER

## 2023-06-08 RX ORDER — BUDESONIDE AND FORMOTEROL FUMARATE DIHYDRATE 80; 4.5 UG/1; UG/1
2 AEROSOL RESPIRATORY (INHALATION)
Qty: 10.2 G | Refills: 3 | Status: SHIPPED | OUTPATIENT
Start: 2023-06-08 | End: 2023-06-08 | Stop reason: SDUPTHER

## 2023-06-08 RX ORDER — BUDESONIDE AND FORMOTEROL FUMARATE DIHYDRATE 80; 4.5 UG/1; UG/1
2 AEROSOL RESPIRATORY (INHALATION)
Qty: 3 EACH | Refills: 1 | Status: SHIPPED | OUTPATIENT
Start: 2023-06-08 | End: 2023-06-08

## 2023-06-08 RX ORDER — BUDESONIDE AND FORMOTEROL FUMARATE DIHYDRATE 80; 4.5 UG/1; UG/1
2 AEROSOL RESPIRATORY (INHALATION)
Qty: 3 EACH | Refills: 1 | Status: SHIPPED | OUTPATIENT
Start: 2023-06-08

## 2023-06-08 RX ORDER — BUDESONIDE AND FORMOTEROL FUMARATE DIHYDRATE 80; 4.5 UG/1; UG/1
2 AEROSOL RESPIRATORY (INHALATION)
Qty: 10.2 G | Refills: 5 | Status: SHIPPED | OUTPATIENT
Start: 2023-06-08 | End: 2023-06-08 | Stop reason: SDUPTHER

## 2023-06-09 ENCOUNTER — TELEPHONE (OUTPATIENT)
Dept: INTERNAL MEDICINE | Facility: CLINIC | Age: 73
End: 2023-06-09
Payer: MEDICARE

## 2023-06-09 RX ORDER — BUDESONIDE AND FORMOTEROL FUMARATE DIHYDRATE 80; 4.5 UG/1; UG/1
2 AEROSOL RESPIRATORY (INHALATION)
Qty: 1 EACH | Refills: 12 | Status: SHIPPED | OUTPATIENT
Start: 2023-06-09

## 2023-06-13 ENCOUNTER — OFFICE VISIT (OUTPATIENT)
Dept: PSYCHIATRY | Facility: CLINIC | Age: 73
End: 2023-06-13
Payer: MEDICARE

## 2023-06-13 ENCOUNTER — CONSULT (OUTPATIENT)
Dept: ONCOLOGY | Facility: HOSPITAL | Age: 73
End: 2023-06-13
Payer: MEDICARE

## 2023-06-13 VITALS
HEART RATE: 87 BPM | SYSTOLIC BLOOD PRESSURE: 170 MMHG | HEIGHT: 62 IN | WEIGHT: 100 LBS | DIASTOLIC BLOOD PRESSURE: 78 MMHG | BODY MASS INDEX: 18.4 KG/M2

## 2023-06-13 VITALS
BODY MASS INDEX: 18.73 KG/M2 | SYSTOLIC BLOOD PRESSURE: 154 MMHG | TEMPERATURE: 97.6 F | HEART RATE: 76 BPM | OXYGEN SATURATION: 97 % | RESPIRATION RATE: 18 BRPM | DIASTOLIC BLOOD PRESSURE: 63 MMHG | WEIGHT: 100.75 LBS

## 2023-06-13 DIAGNOSIS — F31.81 BIPOLAR II DISORDER, MILD, DEPRESSED, WITH MIXED FEATURES, IN PARTIAL REMISSION: ICD-10-CM

## 2023-06-13 DIAGNOSIS — D72.820 LYMPHOCYTOSIS: Primary | ICD-10-CM

## 2023-06-13 DIAGNOSIS — F51.05 INSOMNIA DUE TO MENTAL CONDITION: Primary | ICD-10-CM

## 2023-06-13 DIAGNOSIS — F41.1 GENERALIZED ANXIETY DISORDER: ICD-10-CM

## 2023-06-13 PROCEDURE — G0463 HOSPITAL OUTPT CLINIC VISIT: HCPCS | Performed by: INTERNAL MEDICINE

## 2023-06-13 RX ORDER — GABAPENTIN 300 MG/1
CAPSULE ORAL
Qty: 60 CAPSULE | Refills: 2 | Status: SHIPPED | OUTPATIENT
Start: 2023-06-13

## 2023-06-14 RX ORDER — BUDESONIDE AND FORMOTEROL FUMARATE DIHYDRATE 80; 4.5 UG/1; UG/1
2 AEROSOL RESPIRATORY (INHALATION)
Qty: 3 EACH | Refills: 1 | Status: SHIPPED | OUTPATIENT
Start: 2023-06-14

## 2023-06-19 ENCOUNTER — PATIENT ROUNDING (BHMG ONLY) (OUTPATIENT)
Dept: INTERNAL MEDICINE | Facility: CLINIC | Age: 73
End: 2023-06-19
Payer: MEDICARE

## 2023-07-24 ENCOUNTER — TELEPHONE (OUTPATIENT)
Dept: INTERNAL MEDICINE | Facility: CLINIC | Age: 73
End: 2023-07-24
Payer: MEDICARE

## 2023-07-31 ENCOUNTER — OFFICE VISIT (OUTPATIENT)
Dept: PSYCHIATRY | Facility: CLINIC | Age: 73
End: 2023-07-31
Payer: MEDICARE

## 2023-07-31 VITALS
HEART RATE: 64 BPM | WEIGHT: 100.25 LBS | BODY MASS INDEX: 18.45 KG/M2 | HEIGHT: 62 IN | OXYGEN SATURATION: 98 % | DIASTOLIC BLOOD PRESSURE: 69 MMHG | SYSTOLIC BLOOD PRESSURE: 133 MMHG

## 2023-07-31 DIAGNOSIS — F51.05 INSOMNIA DUE TO MENTAL CONDITION: Primary | ICD-10-CM

## 2023-07-31 DIAGNOSIS — F41.1 GENERALIZED ANXIETY DISORDER: ICD-10-CM

## 2023-07-31 DIAGNOSIS — F31.81 BIPOLAR II DISORDER, MILD, DEPRESSED, WITH MIXED FEATURES, IN PARTIAL REMISSION: ICD-10-CM

## 2023-07-31 RX ORDER — TRAZODONE HYDROCHLORIDE 50 MG/1
100 TABLET ORAL NIGHTLY
Qty: 180 TABLET | Refills: 1 | Status: SHIPPED | OUTPATIENT
Start: 2023-07-31

## 2023-08-07 ENCOUNTER — TELEPHONE (OUTPATIENT)
Dept: PSYCHIATRY | Facility: CLINIC | Age: 73
End: 2023-08-07
Payer: MEDICARE

## 2023-08-29 ENCOUNTER — OFFICE VISIT (OUTPATIENT)
Dept: INTERNAL MEDICINE | Facility: CLINIC | Age: 73
End: 2023-08-29
Payer: MEDICARE

## 2023-08-29 VITALS
OXYGEN SATURATION: 98 % | BODY MASS INDEX: 18.81 KG/M2 | HEART RATE: 67 BPM | DIASTOLIC BLOOD PRESSURE: 78 MMHG | WEIGHT: 99.6 LBS | HEIGHT: 61 IN | RESPIRATION RATE: 18 BRPM | TEMPERATURE: 97.2 F | SYSTOLIC BLOOD PRESSURE: 136 MMHG

## 2023-08-29 DIAGNOSIS — D72.820 LYMPHOCYTOSIS: ICD-10-CM

## 2023-08-29 DIAGNOSIS — F51.01 PRIMARY INSOMNIA: ICD-10-CM

## 2023-08-29 DIAGNOSIS — R13.12 OROPHARYNGEAL DYSPHAGIA: ICD-10-CM

## 2023-08-29 DIAGNOSIS — K11.7 XEROSTOMIA: ICD-10-CM

## 2023-08-29 DIAGNOSIS — E83.19 IRON OVERLOAD: ICD-10-CM

## 2023-08-29 DIAGNOSIS — Z00.00 MEDICARE ANNUAL WELLNESS VISIT, SUBSEQUENT: Primary | ICD-10-CM

## 2023-08-29 DIAGNOSIS — E78.2 MIXED HYPERLIPIDEMIA: ICD-10-CM

## 2023-08-29 DIAGNOSIS — I10 PRIMARY HYPERTENSION: ICD-10-CM

## 2023-08-29 RX ORDER — GABAPENTIN 100 MG/1
CAPSULE ORAL
Qty: 90 CAPSULE | Refills: 1 | Status: SHIPPED | OUTPATIENT
Start: 2023-08-29

## 2023-08-29 RX ORDER — ALBUTEROL SULFATE 90 UG/1
2 AEROSOL, METERED RESPIRATORY (INHALATION) EVERY 4 HOURS PRN
Qty: 18 G | Refills: 1 | Status: SHIPPED | OUTPATIENT
Start: 2023-08-29

## 2023-08-29 RX ORDER — ALBUTEROL SULFATE 90 UG/1
2 AEROSOL, METERED RESPIRATORY (INHALATION) EVERY 4 HOURS PRN
Qty: 18 G | Refills: 1 | Status: SHIPPED | OUTPATIENT
Start: 2023-08-29 | End: 2023-08-29 | Stop reason: SDUPTHER

## 2023-08-29 RX ORDER — CETIRIZINE HYDROCHLORIDE 10 MG/1
10 TABLET ORAL DAILY
Status: SHIPPED | COMMUNITY
Start: 2023-08-29

## 2023-08-29 RX ORDER — POLYETHYLENE GLYCOL 3350 17 G/17G
17 POWDER, FOR SOLUTION ORAL DAILY
COMMUNITY

## 2023-09-05 ENCOUNTER — LAB (OUTPATIENT)
Dept: LAB | Facility: HOSPITAL | Age: 73
End: 2023-09-05
Payer: MEDICARE

## 2023-09-05 DIAGNOSIS — I10 PRIMARY HYPERTENSION: ICD-10-CM

## 2023-09-05 DIAGNOSIS — E78.2 MIXED HYPERLIPIDEMIA: ICD-10-CM

## 2023-09-05 DIAGNOSIS — E83.19 IRON OVERLOAD: ICD-10-CM

## 2023-09-05 DIAGNOSIS — Z00.00 MEDICARE ANNUAL WELLNESS VISIT, SUBSEQUENT: ICD-10-CM

## 2023-09-05 DIAGNOSIS — K11.7 XEROSTOMIA: ICD-10-CM

## 2023-09-05 LAB
ALBUMIN SERPL-MCNC: 4.6 G/DL (ref 3.5–5.2)
ALBUMIN/GLOB SERPL: 1.5 G/DL
ALP SERPL-CCNC: 78 U/L (ref 39–117)
ALT SERPL W P-5'-P-CCNC: 14 U/L (ref 1–33)
ANION GAP SERPL CALCULATED.3IONS-SCNC: 9.2 MMOL/L (ref 5–15)
AST SERPL-CCNC: 31 U/L (ref 1–32)
BASOPHILS # BLD AUTO: 0.05 10*3/MM3 (ref 0–0.2)
BASOPHILS NFR BLD AUTO: 1 % (ref 0–1.5)
BILIRUB SERPL-MCNC: 0.3 MG/DL (ref 0–1.2)
BUN SERPL-MCNC: 13 MG/DL (ref 8–23)
BUN/CREAT SERPL: 14.8 (ref 7–25)
CALCIUM SPEC-SCNC: 9.9 MG/DL (ref 8.6–10.5)
CHLORIDE SERPL-SCNC: 104 MMOL/L (ref 98–107)
CHOLEST SERPL-MCNC: 228 MG/DL (ref 0–200)
CO2 SERPL-SCNC: 29.8 MMOL/L (ref 22–29)
CREAT SERPL-MCNC: 0.88 MG/DL (ref 0.57–1)
CRP SERPL-MCNC: <0.3 MG/DL (ref 0–0.5)
DEPRECATED RDW RBC AUTO: 45.9 FL (ref 37–54)
EGFRCR SERPLBLD CKD-EPI 2021: 69.1 ML/MIN/1.73
EOSINOPHIL # BLD AUTO: 0.05 10*3/MM3 (ref 0–0.4)
EOSINOPHIL NFR BLD AUTO: 1 % (ref 0.3–6.2)
ERYTHROCYTE [DISTWIDTH] IN BLOOD BY AUTOMATED COUNT: 13.1 % (ref 12.3–15.4)
ERYTHROCYTE [SEDIMENTATION RATE] IN BLOOD: 19 MM/HR (ref 0–30)
FERRITIN SERPL-MCNC: 169 NG/ML (ref 13–150)
GLOBULIN UR ELPH-MCNC: 3.1 GM/DL
GLUCOSE SERPL-MCNC: 84 MG/DL (ref 65–99)
HCT VFR BLD AUTO: 44.4 % (ref 34–46.6)
HCV AB SER DONR QL: NORMAL
HDLC SERPL-MCNC: 77 MG/DL (ref 40–60)
HGB BLD-MCNC: 14.8 G/DL (ref 12–15.9)
IMM GRANULOCYTES # BLD AUTO: 0.01 10*3/MM3 (ref 0–0.05)
IMM GRANULOCYTES NFR BLD AUTO: 0.2 % (ref 0–0.5)
LDLC SERPL CALC-MCNC: 139 MG/DL (ref 0–100)
LDLC/HDLC SERPL: 1.78 {RATIO}
LYMPHOCYTES # BLD AUTO: 2.71 10*3/MM3 (ref 0.7–3.1)
LYMPHOCYTES NFR BLD AUTO: 52.4 % (ref 19.6–45.3)
MCH RBC QN AUTO: 32.2 PG (ref 26.6–33)
MCHC RBC AUTO-ENTMCNC: 33.3 G/DL (ref 31.5–35.7)
MCV RBC AUTO: 96.5 FL (ref 79–97)
MONOCYTES # BLD AUTO: 0.37 10*3/MM3 (ref 0.1–0.9)
MONOCYTES NFR BLD AUTO: 7.2 % (ref 5–12)
NEUTROPHILS NFR BLD AUTO: 1.98 10*3/MM3 (ref 1.7–7)
NEUTROPHILS NFR BLD AUTO: 38.2 % (ref 42.7–76)
NRBC BLD AUTO-RTO: 0 /100 WBC (ref 0–0.2)
PLATELET # BLD AUTO: 193 10*3/MM3 (ref 140–450)
PMV BLD AUTO: 10.7 FL (ref 6–12)
POTASSIUM SERPL-SCNC: 4.3 MMOL/L (ref 3.5–5.2)
PROT SERPL-MCNC: 7.7 G/DL (ref 6–8.5)
RBC # BLD AUTO: 4.6 10*6/MM3 (ref 3.77–5.28)
SODIUM SERPL-SCNC: 143 MMOL/L (ref 136–145)
TRIGL SERPL-MCNC: 68 MG/DL (ref 0–150)
VLDLC SERPL-MCNC: 12 MG/DL (ref 5–40)
WBC NRBC COR # BLD: 5.17 10*3/MM3 (ref 3.4–10.8)

## 2023-09-05 PROCEDURE — 36415 COLL VENOUS BLD VENIPUNCTURE: CPT

## 2023-09-05 PROCEDURE — 85025 COMPLETE CBC W/AUTO DIFF WBC: CPT

## 2023-09-05 PROCEDURE — 82728 ASSAY OF FERRITIN: CPT

## 2023-09-05 PROCEDURE — 86140 C-REACTIVE PROTEIN: CPT

## 2023-09-05 PROCEDURE — 86803 HEPATITIS C AB TEST: CPT

## 2023-09-05 PROCEDURE — 84466 ASSAY OF TRANSFERRIN: CPT

## 2023-09-05 PROCEDURE — 80061 LIPID PANEL: CPT

## 2023-09-05 PROCEDURE — 83540 ASSAY OF IRON: CPT

## 2023-09-05 PROCEDURE — 85652 RBC SED RATE AUTOMATED: CPT

## 2023-09-05 PROCEDURE — 80053 COMPREHEN METABOLIC PANEL: CPT

## 2023-09-05 PROCEDURE — 86038 ANTINUCLEAR ANTIBODIES: CPT

## 2023-09-05 PROCEDURE — 86431 RHEUMATOID FACTOR QUANT: CPT

## 2023-09-06 LAB
CHROMATIN AB SERPL-ACNC: <10 IU/ML (ref 0–14)
IRON 24H UR-MRATE: 106 MCG/DL (ref 37–145)
IRON SATN MFR SERPL: 28 % (ref 20–50)
TIBC SERPL-MCNC: 383 MCG/DL (ref 298–536)
TRANSFERRIN SERPL-MCNC: 257 MG/DL (ref 200–360)

## 2023-09-07 LAB — ANA SER QL: NEGATIVE

## 2023-09-11 ENCOUNTER — HOSPITAL ENCOUNTER (OUTPATIENT)
Dept: CARDIOLOGY | Facility: HOSPITAL | Age: 73
Discharge: HOME | End: 2023-09-11
Admitting: INTERNAL MEDICINE
Payer: MEDICARE

## 2023-09-11 DIAGNOSIS — I73.9 CLAUDICATION: ICD-10-CM

## 2023-09-11 LAB
BH CV LOWER ARTERIAL LEFT ABI RATIO: 0.98
BH CV LOWER ARTERIAL LEFT DORSALIS PEDIS SYS MAX: 152
BH CV LOWER ARTERIAL LEFT GREAT TOE SYS MAX: 96
BH CV LOWER ARTERIAL LEFT POST EX ABI RATIO: 1.01
BH CV LOWER ARTERIAL LEFT POST TIBIAL SYS MAX: 163
BH CV LOWER ARTERIAL LEFT TBI RATIO: 0.58
BH CV LOWER ARTERIAL RIGHT ABI RATIO: 0.97
BH CV LOWER ARTERIAL RIGHT DORSALIS PEDIS SYS MAX: 161
BH CV LOWER ARTERIAL RIGHT GREAT TOE SYS MAX: 94
BH CV LOWER ARTERIAL RIGHT POST EX ABI RATIO: 0.93
BH CV LOWER ARTERIAL RIGHT POST TIBIAL SYS MAX: 153
BH CV LOWER ARTERIAL RIGHT TBI RATIO: 0.57
UPPER ARTERIAL LEFT ARM BRACHIAL SYS MAX: 159
UPPER ARTERIAL RIGHT ARM BRACHIAL SYS MAX: 166

## 2023-09-11 PROCEDURE — 93924 LWR XTR VASC STDY BILAT: CPT | Performed by: SURGERY

## 2023-09-11 PROCEDURE — 93924 LWR XTR VASC STDY BILAT: CPT

## 2023-10-04 ENCOUNTER — OFFICE VISIT (OUTPATIENT)
Dept: INTERNAL MEDICINE | Facility: CLINIC | Age: 73
End: 2023-10-04
Payer: MEDICARE

## 2023-10-04 VITALS
HEART RATE: 70 BPM | WEIGHT: 97.6 LBS | TEMPERATURE: 97.8 F | BODY MASS INDEX: 18.43 KG/M2 | OXYGEN SATURATION: 93 % | DIASTOLIC BLOOD PRESSURE: 70 MMHG | HEIGHT: 61 IN | SYSTOLIC BLOOD PRESSURE: 110 MMHG

## 2023-10-04 DIAGNOSIS — Z23 NEED FOR VACCINATION: ICD-10-CM

## 2023-10-04 DIAGNOSIS — F33.0 MILD EPISODE OF RECURRENT MAJOR DEPRESSIVE DISORDER: ICD-10-CM

## 2023-10-04 DIAGNOSIS — I10 PRIMARY HYPERTENSION: ICD-10-CM

## 2023-10-04 DIAGNOSIS — E78.2 MIXED HYPERLIPIDEMIA: ICD-10-CM

## 2023-10-04 DIAGNOSIS — D72.820 LYMPHOCYTOSIS: ICD-10-CM

## 2023-10-04 DIAGNOSIS — E83.19 IRON OVERLOAD: ICD-10-CM

## 2023-10-04 DIAGNOSIS — J43.1 PANLOBULAR EMPHYSEMA: Primary | ICD-10-CM

## 2023-10-04 DIAGNOSIS — K11.7 XEROSTOMIA: ICD-10-CM

## 2023-10-04 PROCEDURE — 90662 IIV NO PRSV INCREASED AG IM: CPT | Performed by: INTERNAL MEDICINE

## 2023-10-04 PROCEDURE — 99214 OFFICE O/P EST MOD 30 MIN: CPT | Performed by: INTERNAL MEDICINE

## 2023-10-04 PROCEDURE — 3078F DIAST BP <80 MM HG: CPT | Performed by: INTERNAL MEDICINE

## 2023-10-04 PROCEDURE — 1160F RVW MEDS BY RX/DR IN RCRD: CPT | Performed by: INTERNAL MEDICINE

## 2023-10-04 PROCEDURE — G0008 ADMIN INFLUENZA VIRUS VAC: HCPCS | Performed by: INTERNAL MEDICINE

## 2023-10-04 PROCEDURE — 3074F SYST BP LT 130 MM HG: CPT | Performed by: INTERNAL MEDICINE

## 2023-10-04 PROCEDURE — 1159F MED LIST DOCD IN RCRD: CPT | Performed by: INTERNAL MEDICINE

## 2023-10-04 RX ORDER — MONTELUKAST SODIUM 10 MG/1
10 TABLET ORAL NIGHTLY
Qty: 90 TABLET | Refills: 1 | Status: SHIPPED | OUTPATIENT
Start: 2023-10-04

## 2023-10-09 ENCOUNTER — TELEPHONE (OUTPATIENT)
Dept: INTERNAL MEDICINE | Facility: CLINIC | Age: 73
End: 2023-10-09

## 2023-10-26 ENCOUNTER — TELEPHONE (OUTPATIENT)
Dept: INTERNAL MEDICINE | Facility: CLINIC | Age: 73
End: 2023-10-26
Payer: MEDICARE

## 2023-10-30 ENCOUNTER — TELEPHONE (OUTPATIENT)
Dept: INTERNAL MEDICINE | Facility: CLINIC | Age: 73
End: 2023-10-30
Payer: MEDICARE

## 2023-10-30 DIAGNOSIS — R30.0 DYSURIA: Primary | ICD-10-CM

## 2023-10-30 DIAGNOSIS — K21.9 GASTROESOPHAGEAL REFLUX DISEASE, UNSPECIFIED WHETHER ESOPHAGITIS PRESENT: Primary | ICD-10-CM

## 2023-10-30 RX ORDER — FAMOTIDINE 20 MG/1
20 TABLET, FILM COATED ORAL 2 TIMES DAILY
Qty: 180 TABLET | Refills: 1 | Status: SHIPPED | OUTPATIENT
Start: 2023-10-30

## 2023-10-30 RX ORDER — PANTOPRAZOLE SODIUM 40 MG/1
40 TABLET, DELAYED RELEASE ORAL DAILY
Qty: 90 TABLET | Refills: 1 | Status: SHIPPED | OUTPATIENT
Start: 2023-10-30 | End: 2023-10-30

## 2023-11-02 ENCOUNTER — TRANSCRIBE ORDERS (OUTPATIENT)
Dept: ADMINISTRATIVE | Facility: HOSPITAL | Age: 73
End: 2023-11-02
Payer: MEDICARE

## 2023-11-02 DIAGNOSIS — Z12.31 VISIT FOR SCREENING MAMMOGRAM: Primary | ICD-10-CM

## 2023-11-07 ENCOUNTER — OFFICE VISIT (OUTPATIENT)
Dept: INTERNAL MEDICINE | Facility: CLINIC | Age: 73
End: 2023-11-07
Payer: MEDICARE

## 2023-11-07 VITALS
TEMPERATURE: 97.3 F | BODY MASS INDEX: 18.05 KG/M2 | DIASTOLIC BLOOD PRESSURE: 70 MMHG | HEIGHT: 61 IN | OXYGEN SATURATION: 93 % | WEIGHT: 95.6 LBS | HEART RATE: 82 BPM | SYSTOLIC BLOOD PRESSURE: 110 MMHG

## 2023-11-07 DIAGNOSIS — R30.0 DYSURIA: ICD-10-CM

## 2023-11-07 DIAGNOSIS — F33.0 MILD EPISODE OF RECURRENT MAJOR DEPRESSIVE DISORDER: ICD-10-CM

## 2023-11-07 DIAGNOSIS — F51.01 PRIMARY INSOMNIA: ICD-10-CM

## 2023-11-07 DIAGNOSIS — R13.12 OROPHARYNGEAL DYSPHAGIA: Primary | ICD-10-CM

## 2023-11-07 PROCEDURE — 3078F DIAST BP <80 MM HG: CPT | Performed by: INTERNAL MEDICINE

## 2023-11-07 PROCEDURE — 81001 URINALYSIS AUTO W/SCOPE: CPT | Performed by: INTERNAL MEDICINE

## 2023-11-07 PROCEDURE — 3074F SYST BP LT 130 MM HG: CPT | Performed by: INTERNAL MEDICINE

## 2023-11-07 PROCEDURE — 1160F RVW MEDS BY RX/DR IN RCRD: CPT | Performed by: INTERNAL MEDICINE

## 2023-11-07 PROCEDURE — 1159F MED LIST DOCD IN RCRD: CPT | Performed by: INTERNAL MEDICINE

## 2023-11-08 ENCOUNTER — TELEPHONE (OUTPATIENT)
Dept: INTERNAL MEDICINE | Facility: CLINIC | Age: 73
End: 2023-11-08
Payer: MEDICARE

## 2023-11-08 LAB
BACTERIA UR QL AUTO: ABNORMAL /HPF
BILIRUB UR QL STRIP: NEGATIVE
CLARITY UR: CLEAR
COD CRY URNS QL: ABNORMAL /HPF
COLOR UR: ABNORMAL
FINE GRAN CASTS URNS QL MICRO: ABNORMAL /LPF
GLUCOSE UR STRIP-MCNC: NEGATIVE MG/DL
HGB UR QL STRIP.AUTO: NEGATIVE
HOLD SPECIMEN: NORMAL
HYALINE CASTS UR QL AUTO: ABNORMAL /LPF
KETONES UR QL STRIP: ABNORMAL
LEUKOCYTE ESTERASE UR QL STRIP.AUTO: ABNORMAL
MUCOUS THREADS URNS QL MICRO: ABNORMAL /HPF
NITRITE UR QL STRIP: NEGATIVE
PH UR STRIP.AUTO: 6 [PH] (ref 5–8)
PROT UR QL STRIP: ABNORMAL
RBC # UR STRIP: ABNORMAL /HPF
REF LAB TEST METHOD: ABNORMAL
SP GR UR STRIP: 1.02 (ref 1–1.03)
SQUAMOUS #/AREA URNS HPF: ABNORMAL /HPF
UROBILINOGEN UR QL STRIP: ABNORMAL
WBC # UR STRIP: ABNORMAL /HPF

## 2023-11-08 RX ORDER — CITALOPRAM HYDROBROMIDE 10 MG/1
10 TABLET ORAL DAILY
Qty: 90 TABLET | Refills: 1 | Status: SHIPPED | OUTPATIENT
Start: 2023-11-08

## 2023-11-20 ENCOUNTER — TELEPHONE (OUTPATIENT)
Dept: INTERNAL MEDICINE | Facility: CLINIC | Age: 73
End: 2023-11-20
Payer: MEDICARE

## 2023-11-20 RX ORDER — FAMOTIDINE 20 MG/1
20 TABLET, FILM COATED ORAL 2 TIMES DAILY
Qty: 180 TABLET | Refills: 1 | Status: SHIPPED | OUTPATIENT
Start: 2023-11-20

## 2023-11-21 PROCEDURE — 87660 TRICHOMONAS VAGIN DIR PROBE: CPT | Performed by: OBSTETRICS & GYNECOLOGY

## 2023-11-21 PROCEDURE — 87480 CANDIDA DNA DIR PROBE: CPT | Performed by: OBSTETRICS & GYNECOLOGY

## 2023-11-21 PROCEDURE — 87510 GARDNER VAG DNA DIR PROBE: CPT | Performed by: OBSTETRICS & GYNECOLOGY

## 2023-11-22 ENCOUNTER — LAB REQUISITION (OUTPATIENT)
Dept: LAB | Facility: HOSPITAL | Age: 73
End: 2023-11-22
Payer: MEDICARE

## 2023-11-22 DIAGNOSIS — N89.9 NONINFLAMMATORY DISORDER OF VAGINA, UNSPECIFIED: ICD-10-CM

## 2023-11-22 LAB
CANDIDA SPECIES: NEGATIVE
GARDNERELLA VAGINALIS: POSITIVE
T VAGINALIS DNA VAG QL PROBE+SIG AMP: NEGATIVE

## 2023-11-28 ENCOUNTER — OFFICE VISIT (OUTPATIENT)
Dept: INTERNAL MEDICINE | Facility: CLINIC | Age: 73
End: 2023-11-28
Payer: MEDICARE

## 2023-11-28 ENCOUNTER — TELEPHONE (OUTPATIENT)
Dept: GASTROENTEROLOGY | Facility: CLINIC | Age: 73
End: 2023-11-28
Payer: MEDICARE

## 2023-11-28 VITALS
OXYGEN SATURATION: 90 % | HEIGHT: 61 IN | SYSTOLIC BLOOD PRESSURE: 140 MMHG | BODY MASS INDEX: 17.94 KG/M2 | WEIGHT: 95 LBS | HEART RATE: 68 BPM | RESPIRATION RATE: 18 BRPM | DIASTOLIC BLOOD PRESSURE: 84 MMHG | TEMPERATURE: 97.3 F

## 2023-11-28 DIAGNOSIS — F41.1 GENERALIZED ANXIETY DISORDER: ICD-10-CM

## 2023-11-28 DIAGNOSIS — J43.1 PANLOBULAR EMPHYSEMA: ICD-10-CM

## 2023-11-28 DIAGNOSIS — F33.0 MILD EPISODE OF RECURRENT MAJOR DEPRESSIVE DISORDER: ICD-10-CM

## 2023-11-28 DIAGNOSIS — F51.05 INSOMNIA DUE TO MENTAL CONDITION: ICD-10-CM

## 2023-11-28 DIAGNOSIS — R13.12 OROPHARYNGEAL DYSPHAGIA: Primary | ICD-10-CM

## 2023-11-28 DIAGNOSIS — F31.81 BIPOLAR II DISORDER, MILD, DEPRESSED, WITH MIXED FEATURES, IN PARTIAL REMISSION: ICD-10-CM

## 2023-11-28 PROCEDURE — 3079F DIAST BP 80-89 MM HG: CPT | Performed by: INTERNAL MEDICINE

## 2023-11-28 PROCEDURE — 1159F MED LIST DOCD IN RCRD: CPT | Performed by: INTERNAL MEDICINE

## 2023-11-28 PROCEDURE — 3077F SYST BP >= 140 MM HG: CPT | Performed by: INTERNAL MEDICINE

## 2023-11-28 PROCEDURE — 99214 OFFICE O/P EST MOD 30 MIN: CPT | Performed by: INTERNAL MEDICINE

## 2023-11-28 PROCEDURE — 1160F RVW MEDS BY RX/DR IN RCRD: CPT | Performed by: INTERNAL MEDICINE

## 2023-11-28 RX ORDER — SUCRALFATE ORAL 1 G/10ML
1 SUSPENSION ORAL 3 TIMES DAILY
Qty: 840 ML | Refills: 0 | Status: SHIPPED | OUTPATIENT
Start: 2023-11-28 | End: 2023-12-26

## 2023-12-18 ENCOUNTER — CLINICAL SUPPORT (OUTPATIENT)
Dept: GASTROENTEROLOGY | Facility: CLINIC | Age: 73
End: 2023-12-18
Payer: MEDICARE

## 2023-12-18 ENCOUNTER — PREP FOR SURGERY (OUTPATIENT)
Dept: OTHER | Facility: HOSPITAL | Age: 73
End: 2023-12-18
Payer: MEDICARE

## 2023-12-18 DIAGNOSIS — K21.9 GASTROESOPHAGEAL REFLUX DISEASE, UNSPECIFIED WHETHER ESOPHAGITIS PRESENT: Primary | ICD-10-CM

## 2024-01-09 ENCOUNTER — TELEPHONE (OUTPATIENT)
Dept: GASTROENTEROLOGY | Facility: CLINIC | Age: 74
End: 2024-01-09
Payer: MEDICARE

## 2024-01-24 ENCOUNTER — ANESTHESIA EVENT (OUTPATIENT)
Dept: GASTROENTEROLOGY | Facility: HOSPITAL | Age: 74
End: 2024-01-24
Payer: MEDICARE

## 2024-01-25 ENCOUNTER — HOSPITAL ENCOUNTER (OUTPATIENT)
Facility: HOSPITAL | Age: 74
Setting detail: HOSPITAL OUTPATIENT SURGERY
Discharge: HOME | End: 2024-01-25
Attending: INTERNAL MEDICINE | Admitting: INTERNAL MEDICINE
Payer: MEDICARE

## 2024-01-25 ENCOUNTER — ANESTHESIA (OUTPATIENT)
Dept: GASTROENTEROLOGY | Facility: HOSPITAL | Age: 74
End: 2024-01-25
Payer: MEDICARE

## 2024-01-25 VITALS
HEART RATE: 62 BPM | WEIGHT: 92.59 LBS | DIASTOLIC BLOOD PRESSURE: 62 MMHG | TEMPERATURE: 98.2 F | SYSTOLIC BLOOD PRESSURE: 126 MMHG | RESPIRATION RATE: 22 BRPM | BODY MASS INDEX: 17.48 KG/M2 | OXYGEN SATURATION: 96 % | HEIGHT: 61 IN

## 2024-01-25 DIAGNOSIS — K21.9 GASTROESOPHAGEAL REFLUX DISEASE, UNSPECIFIED WHETHER ESOPHAGITIS PRESENT: ICD-10-CM

## 2024-01-25 PROCEDURE — 88305 TISSUE EXAM BY PATHOLOGIST: CPT | Performed by: INTERNAL MEDICINE

## 2024-01-25 PROCEDURE — 43249 ESOPH EGD DILATION <30 MM: CPT | Performed by: INTERNAL MEDICINE

## 2024-01-25 PROCEDURE — 25810000003 LACTATED RINGERS PER 1000 ML

## 2024-01-25 PROCEDURE — 25010000002 PROPOFOL 10 MG/ML EMULSION: Performed by: NURSE ANESTHETIST, CERTIFIED REGISTERED

## 2024-01-25 PROCEDURE — C1726 CATH, BAL DIL, NON-VASCULAR: HCPCS | Performed by: INTERNAL MEDICINE

## 2024-01-25 RX ORDER — PROPOFOL 10 MG/ML
VIAL (ML) INTRAVENOUS AS NEEDED
Status: DISCONTINUED | OUTPATIENT
Start: 2024-01-25 | End: 2024-01-25 | Stop reason: SURG

## 2024-01-25 RX ORDER — LIDOCAINE HYDROCHLORIDE 20 MG/ML
INJECTION, SOLUTION EPIDURAL; INFILTRATION; INTRACAUDAL; PERINEURAL AS NEEDED
Status: DISCONTINUED | OUTPATIENT
Start: 2024-01-25 | End: 2024-01-25 | Stop reason: SURG

## 2024-01-25 RX ORDER — PANTOPRAZOLE SODIUM 20 MG/1
20 TABLET, DELAYED RELEASE ORAL DAILY
Qty: 30 TABLET | Refills: 1 | Status: SHIPPED | OUTPATIENT
Start: 2024-01-25

## 2024-01-25 RX ORDER — SODIUM CHLORIDE, SODIUM LACTATE, POTASSIUM CHLORIDE, CALCIUM CHLORIDE 600; 310; 30; 20 MG/100ML; MG/100ML; MG/100ML; MG/100ML
30 INJECTION, SOLUTION INTRAVENOUS CONTINUOUS
Status: DISCONTINUED | OUTPATIENT
Start: 2024-01-25 | End: 2024-01-25 | Stop reason: HOSPADM

## 2024-01-25 RX ADMIN — LIDOCAINE HYDROCHLORIDE 40 MG: 20 INJECTION, SOLUTION EPIDURAL; INFILTRATION; INTRACAUDAL; PERINEURAL at 09:48

## 2024-01-25 RX ADMIN — PROPOFOL 200 MCG/KG/MIN: 10 INJECTION, EMULSION INTRAVENOUS at 09:48

## 2024-01-25 RX ADMIN — SODIUM CHLORIDE, POTASSIUM CHLORIDE, SODIUM LACTATE AND CALCIUM CHLORIDE 30 ML/HR: 600; 310; 30; 20 INJECTION, SOLUTION INTRAVENOUS at 09:20

## 2024-01-25 RX ADMIN — PROPOFOL 30 MG: 10 INJECTION, EMULSION INTRAVENOUS at 09:48

## 2024-01-26 ENCOUNTER — TELEPHONE (OUTPATIENT)
Dept: GASTROENTEROLOGY | Facility: CLINIC | Age: 74
End: 2024-01-26
Payer: MEDICARE

## 2024-01-26 LAB
CYTO UR: NORMAL
LAB AP CASE REPORT: NORMAL
LAB AP CLINICAL INFORMATION: NORMAL
PATH REPORT.FINAL DX SPEC: NORMAL
PATH REPORT.GROSS SPEC: NORMAL

## 2024-02-01 ENCOUNTER — TELEPHONE (OUTPATIENT)
Dept: GASTROENTEROLOGY | Facility: CLINIC | Age: 74
End: 2024-02-01
Payer: MEDICARE

## 2024-02-05 DIAGNOSIS — F51.01 PRIMARY INSOMNIA: ICD-10-CM

## 2024-02-05 DIAGNOSIS — F51.05 INSOMNIA DUE TO MENTAL CONDITION: ICD-10-CM

## 2024-02-06 ENCOUNTER — HOSPITAL ENCOUNTER (OUTPATIENT)
Dept: MAMMOGRAPHY | Facility: HOSPITAL | Age: 74
Discharge: HOME | End: 2024-02-06
Admitting: INTERNAL MEDICINE
Payer: MEDICARE

## 2024-02-06 DIAGNOSIS — Z12.31 VISIT FOR SCREENING MAMMOGRAM: ICD-10-CM

## 2024-02-06 PROCEDURE — 77063 BREAST TOMOSYNTHESIS BI: CPT

## 2024-02-06 PROCEDURE — 77067 SCR MAMMO BI INCL CAD: CPT

## 2024-02-06 RX ORDER — TRAZODONE HYDROCHLORIDE 50 MG/1
100 TABLET ORAL NIGHTLY
Qty: 180 TABLET | Refills: 0 | Status: SHIPPED | OUTPATIENT
Start: 2024-02-06

## 2024-02-06 RX ORDER — GABAPENTIN 100 MG/1
CAPSULE ORAL
Qty: 90 CAPSULE | Refills: 1 | OUTPATIENT
Start: 2024-02-06

## 2024-02-08 ENCOUNTER — OFFICE VISIT (OUTPATIENT)
Dept: INTERNAL MEDICINE | Facility: CLINIC | Age: 74
End: 2024-02-08
Payer: MEDICARE

## 2024-02-08 VITALS
WEIGHT: 93.8 LBS | BODY MASS INDEX: 17.71 KG/M2 | OXYGEN SATURATION: 96 % | HEART RATE: 51 BPM | TEMPERATURE: 97.3 F | SYSTOLIC BLOOD PRESSURE: 110 MMHG | DIASTOLIC BLOOD PRESSURE: 58 MMHG | HEIGHT: 61 IN

## 2024-02-08 DIAGNOSIS — J43.1 PANLOBULAR EMPHYSEMA: Primary | ICD-10-CM

## 2024-02-08 DIAGNOSIS — E78.2 MIXED HYPERLIPIDEMIA: ICD-10-CM

## 2024-02-08 DIAGNOSIS — R63.4 WEIGHT LOSS: ICD-10-CM

## 2024-02-08 DIAGNOSIS — F51.01 PRIMARY INSOMNIA: ICD-10-CM

## 2024-02-08 RX ORDER — ZOLPIDEM TARTRATE 5 MG/1
5 TABLET ORAL NIGHTLY PRN
Qty: 90 TABLET | Refills: 1 | Status: SHIPPED | OUTPATIENT
Start: 2024-02-08 | End: 2024-02-08 | Stop reason: SDUPTHER

## 2024-02-08 RX ORDER — ZOLPIDEM TARTRATE 5 MG/1
5 TABLET ORAL NIGHTLY PRN
Qty: 90 TABLET | Refills: 1 | Status: SHIPPED | OUTPATIENT
Start: 2024-02-08 | End: 2024-02-12 | Stop reason: SDUPTHER

## 2024-02-08 RX ORDER — PANTOPRAZOLE SODIUM 20 MG/1
20 TABLET, DELAYED RELEASE ORAL DAILY
Qty: 90 TABLET | Refills: 1 | Status: SHIPPED | OUTPATIENT
Start: 2024-02-08

## 2024-02-08 RX ORDER — FAMOTIDINE 20 MG/1
20 TABLET, FILM COATED ORAL DAILY
COMMUNITY
Start: 2024-02-05 | End: 2024-02-08 | Stop reason: SDUPTHER

## 2024-02-08 RX ORDER — CITALOPRAM HYDROBROMIDE 10 MG/1
10 TABLET ORAL DAILY
COMMUNITY

## 2024-02-08 RX ORDER — FAMOTIDINE 20 MG/1
20 TABLET, FILM COATED ORAL NIGHTLY
Qty: 90 TABLET | Refills: 1 | Status: SHIPPED | OUTPATIENT
Start: 2024-02-08

## 2024-02-12 ENCOUNTER — TELEPHONE (OUTPATIENT)
Dept: INTERNAL MEDICINE | Facility: CLINIC | Age: 74
End: 2024-02-12
Payer: MEDICARE

## 2024-02-12 DIAGNOSIS — F51.01 PRIMARY INSOMNIA: ICD-10-CM

## 2024-02-12 RX ORDER — ZOLPIDEM TARTRATE 5 MG/1
5 TABLET ORAL NIGHTLY PRN
Qty: 10 TABLET | Refills: 0 | Status: SHIPPED | OUTPATIENT
Start: 2024-02-12 | End: 2024-02-16

## 2024-02-12 RX ORDER — ZOLPIDEM TARTRATE 5 MG/1
5 TABLET ORAL NIGHTLY PRN
Qty: 90 TABLET | Refills: 1 | OUTPATIENT
Start: 2024-02-12

## 2024-02-15 ENCOUNTER — TELEPHONE (OUTPATIENT)
Dept: INTERNAL MEDICINE | Facility: CLINIC | Age: 74
End: 2024-02-15
Payer: MEDICARE

## 2024-02-15 ENCOUNTER — HOSPITAL ENCOUNTER (EMERGENCY)
Facility: HOSPITAL | Age: 74
Discharge: HOME | End: 2024-02-15
Attending: EMERGENCY MEDICINE
Payer: MEDICARE

## 2024-02-15 VITALS
RESPIRATION RATE: 18 BRPM | BODY MASS INDEX: 18.61 KG/M2 | SYSTOLIC BLOOD PRESSURE: 161 MMHG | WEIGHT: 98.55 LBS | TEMPERATURE: 97.7 F | HEIGHT: 61 IN | OXYGEN SATURATION: 98 % | DIASTOLIC BLOOD PRESSURE: 69 MMHG | HEART RATE: 65 BPM

## 2024-02-15 DIAGNOSIS — G47.00 INSOMNIA, UNSPECIFIED TYPE: ICD-10-CM

## 2024-02-15 DIAGNOSIS — F41.9 ANXIETY: Primary | ICD-10-CM

## 2024-02-15 LAB
ALBUMIN SERPL-MCNC: 4.5 G/DL (ref 3.5–5.2)
ALBUMIN/GLOB SERPL: 1.3 G/DL
ALP SERPL-CCNC: 88 U/L (ref 39–117)
ALT SERPL W P-5'-P-CCNC: 14 U/L (ref 1–33)
AMPHET+METHAMPHET UR QL: NEGATIVE
ANION GAP SERPL CALCULATED.3IONS-SCNC: 15.3 MMOL/L (ref 5–15)
APAP SERPL-MCNC: <5 MCG/ML (ref 0–30)
AST SERPL-CCNC: 27 U/L (ref 1–32)
BARBITURATES UR QL SCN: NEGATIVE
BASOPHILS # BLD AUTO: 0.04 10*3/MM3 (ref 0–0.2)
BASOPHILS NFR BLD AUTO: 0.6 % (ref 0–1.5)
BENZODIAZ UR QL SCN: NEGATIVE
BILIRUB SERPL-MCNC: 0.4 MG/DL (ref 0–1.2)
BILIRUB UR QL STRIP: NEGATIVE
BUN SERPL-MCNC: 11 MG/DL (ref 8–23)
BUN/CREAT SERPL: 13.1 (ref 7–25)
CALCIUM SPEC-SCNC: 10 MG/DL (ref 8.6–10.5)
CANNABINOIDS SERPL QL: NEGATIVE
CHLORIDE SERPL-SCNC: 98 MMOL/L (ref 98–107)
CLARITY UR: CLEAR
CO2 SERPL-SCNC: 24.7 MMOL/L (ref 22–29)
COCAINE UR QL: NEGATIVE
COLOR UR: YELLOW
CREAT SERPL-MCNC: 0.84 MG/DL (ref 0.57–1)
DEPRECATED RDW RBC AUTO: 48.8 FL (ref 37–54)
EGFRCR SERPLBLD CKD-EPI 2021: 73 ML/MIN/1.73
EOSINOPHIL # BLD AUTO: 0.01 10*3/MM3 (ref 0–0.4)
EOSINOPHIL NFR BLD AUTO: 0.1 % (ref 0.3–6.2)
ERYTHROCYTE [DISTWIDTH] IN BLOOD BY AUTOMATED COUNT: 14 % (ref 12.3–15.4)
ETHANOL BLD-MCNC: <10 MG/DL (ref 0–10)
ETHANOL UR QL: <0.01 %
FENTANYL UR-MCNC: NEGATIVE NG/ML
GLOBULIN UR ELPH-MCNC: 3.6 GM/DL
GLUCOSE SERPL-MCNC: 115 MG/DL (ref 65–99)
GLUCOSE UR STRIP-MCNC: NEGATIVE MG/DL
HCT VFR BLD AUTO: 44.1 % (ref 34–46.6)
HGB BLD-MCNC: 14.6 G/DL (ref 12–15.9)
HGB UR QL STRIP.AUTO: NEGATIVE
HOLD SPECIMEN: NORMAL
HOLD SPECIMEN: NORMAL
IMM GRANULOCYTES # BLD AUTO: 0.01 10*3/MM3 (ref 0–0.05)
IMM GRANULOCYTES NFR BLD AUTO: 0.1 % (ref 0–0.5)
KETONES UR QL STRIP: ABNORMAL
LEUKOCYTE ESTERASE UR QL STRIP.AUTO: NEGATIVE
LYMPHOCYTES # BLD AUTO: 2.17 10*3/MM3 (ref 0.7–3.1)
LYMPHOCYTES NFR BLD AUTO: 30.6 % (ref 19.6–45.3)
MCH RBC QN AUTO: 30.9 PG (ref 26.6–33)
MCHC RBC AUTO-ENTMCNC: 33.1 G/DL (ref 31.5–35.7)
MCV RBC AUTO: 93.4 FL (ref 79–97)
METHADONE UR QL SCN: NEGATIVE
MONOCYTES # BLD AUTO: 0.46 10*3/MM3 (ref 0.1–0.9)
MONOCYTES NFR BLD AUTO: 6.5 % (ref 5–12)
NEUTROPHILS NFR BLD AUTO: 4.4 10*3/MM3 (ref 1.7–7)
NEUTROPHILS NFR BLD AUTO: 62.1 % (ref 42.7–76)
NITRITE UR QL STRIP: NEGATIVE
NRBC BLD AUTO-RTO: 0 /100 WBC (ref 0–0.2)
OPIATES UR QL: NEGATIVE
OXYCODONE UR QL SCN: NEGATIVE
PH UR STRIP.AUTO: 7 [PH] (ref 5–8)
PLATELET # BLD AUTO: 215 10*3/MM3 (ref 140–450)
PMV BLD AUTO: 10 FL (ref 6–12)
POTASSIUM SERPL-SCNC: 3.9 MMOL/L (ref 3.5–5.2)
PROT SERPL-MCNC: 8.1 G/DL (ref 6–8.5)
PROT UR QL STRIP: NEGATIVE
QT INTERVAL: 389 MS
QTC INTERVAL: 458 MS
RBC # BLD AUTO: 4.72 10*6/MM3 (ref 3.77–5.28)
SALICYLATES SERPL-MCNC: <0.3 MG/DL
SODIUM SERPL-SCNC: 138 MMOL/L (ref 136–145)
SP GR UR STRIP: <=1.005 (ref 1–1.03)
TROPONIN T SERPL HS-MCNC: 15 NG/L
UROBILINOGEN UR QL STRIP: ABNORMAL
WBC NRBC COR # BLD AUTO: 7.09 10*3/MM3 (ref 3.4–10.8)
WHOLE BLOOD HOLD COAG: NORMAL
WHOLE BLOOD HOLD SPECIMEN: NORMAL

## 2024-02-15 PROCEDURE — 80307 DRUG TEST PRSMV CHEM ANLYZR: CPT | Performed by: EMERGENCY MEDICINE

## 2024-02-15 PROCEDURE — 80053 COMPREHEN METABOLIC PANEL: CPT

## 2024-02-15 PROCEDURE — 93005 ELECTROCARDIOGRAM TRACING: CPT

## 2024-02-15 PROCEDURE — 25810000003 SODIUM CHLORIDE 0.9 % SOLUTION: Performed by: EMERGENCY MEDICINE

## 2024-02-15 PROCEDURE — 80179 DRUG ASSAY SALICYLATE: CPT

## 2024-02-15 PROCEDURE — 80143 DRUG ASSAY ACETAMINOPHEN: CPT

## 2024-02-15 PROCEDURE — 93005 ELECTROCARDIOGRAM TRACING: CPT | Performed by: EMERGENCY MEDICINE

## 2024-02-15 PROCEDURE — 96374 THER/PROPH/DIAG INJ IV PUSH: CPT

## 2024-02-15 PROCEDURE — 82077 ASSAY SPEC XCP UR&BREATH IA: CPT

## 2024-02-15 PROCEDURE — 81003 URINALYSIS AUTO W/O SCOPE: CPT | Performed by: EMERGENCY MEDICINE

## 2024-02-15 PROCEDURE — 99283 EMERGENCY DEPT VISIT LOW MDM: CPT

## 2024-02-15 PROCEDURE — 84484 ASSAY OF TROPONIN QUANT: CPT

## 2024-02-15 PROCEDURE — 85025 COMPLETE CBC W/AUTO DIFF WBC: CPT

## 2024-02-15 RX ORDER — SODIUM CHLORIDE 0.9 % (FLUSH) 0.9 %
10 SYRINGE (ML) INJECTION AS NEEDED
Status: DISCONTINUED | OUTPATIENT
Start: 2024-02-15 | End: 2024-02-16 | Stop reason: HOSPADM

## 2024-02-15 RX ORDER — LORAZEPAM 0.5 MG/1
0.5 TABLET ORAL ONCE
Status: COMPLETED | OUTPATIENT
Start: 2024-02-15 | End: 2024-02-15

## 2024-02-15 RX ADMIN — LORAZEPAM 0.5 MG: 0.5 TABLET ORAL at 21:02

## 2024-02-15 RX ADMIN — LORAZEPAM 0.5 MG: 0.5 TABLET ORAL at 20:15

## 2024-02-15 RX ADMIN — SODIUM CHLORIDE 1000 ML: 9 INJECTION, SOLUTION INTRAVENOUS at 19:39

## 2024-02-15 RX ADMIN — METOPROLOL TARTRATE 5 MG: 1 INJECTION, SOLUTION INTRAVENOUS at 21:03

## 2024-02-16 ENCOUNTER — OFFICE VISIT (OUTPATIENT)
Dept: INTERNAL MEDICINE | Facility: CLINIC | Age: 74
End: 2024-02-16
Payer: MEDICARE

## 2024-02-16 VITALS
BODY MASS INDEX: 16.5 KG/M2 | HEART RATE: 73 BPM | SYSTOLIC BLOOD PRESSURE: 150 MMHG | TEMPERATURE: 98 F | HEIGHT: 61 IN | OXYGEN SATURATION: 97 % | WEIGHT: 87.4 LBS | DIASTOLIC BLOOD PRESSURE: 79 MMHG

## 2024-02-16 DIAGNOSIS — F33.1 MODERATE EPISODE OF RECURRENT MAJOR DEPRESSIVE DISORDER: Primary | ICD-10-CM

## 2024-02-16 DIAGNOSIS — F51.02 ADJUSTMENT INSOMNIA: ICD-10-CM

## 2024-02-16 DIAGNOSIS — F41.0 PANIC ATTACKS: ICD-10-CM

## 2024-02-16 DIAGNOSIS — F51.01 PRIMARY INSOMNIA: ICD-10-CM

## 2024-02-16 PROCEDURE — 1159F MED LIST DOCD IN RCRD: CPT | Performed by: INTERNAL MEDICINE

## 2024-02-16 PROCEDURE — 3077F SYST BP >= 140 MM HG: CPT | Performed by: INTERNAL MEDICINE

## 2024-02-16 PROCEDURE — 3078F DIAST BP <80 MM HG: CPT | Performed by: INTERNAL MEDICINE

## 2024-02-16 PROCEDURE — 1160F RVW MEDS BY RX/DR IN RCRD: CPT | Performed by: INTERNAL MEDICINE

## 2024-02-16 PROCEDURE — 99214 OFFICE O/P EST MOD 30 MIN: CPT | Performed by: INTERNAL MEDICINE

## 2024-02-16 RX ORDER — LORAZEPAM 0.5 MG/1
TABLET ORAL
Qty: 90 TABLET | Refills: 1 | Status: SHIPPED | OUTPATIENT
Start: 2024-02-16

## 2024-02-27 ENCOUNTER — OFFICE VISIT (OUTPATIENT)
Dept: INTERNAL MEDICINE | Facility: CLINIC | Age: 74
End: 2024-02-27
Payer: MEDICARE

## 2024-02-27 VITALS
TEMPERATURE: 98.4 F | WEIGHT: 89 LBS | SYSTOLIC BLOOD PRESSURE: 158 MMHG | HEART RATE: 91 BPM | HEIGHT: 61 IN | BODY MASS INDEX: 16.8 KG/M2 | OXYGEN SATURATION: 95 % | DIASTOLIC BLOOD PRESSURE: 80 MMHG

## 2024-02-27 DIAGNOSIS — I10 PRIMARY HYPERTENSION: Primary | ICD-10-CM

## 2024-02-27 DIAGNOSIS — F41.9 ANXIETY: ICD-10-CM

## 2024-02-27 PROCEDURE — 3079F DIAST BP 80-89 MM HG: CPT | Performed by: INTERNAL MEDICINE

## 2024-02-27 PROCEDURE — 1159F MED LIST DOCD IN RCRD: CPT | Performed by: INTERNAL MEDICINE

## 2024-02-27 PROCEDURE — 1160F RVW MEDS BY RX/DR IN RCRD: CPT | Performed by: INTERNAL MEDICINE

## 2024-02-27 PROCEDURE — 99213 OFFICE O/P EST LOW 20 MIN: CPT | Performed by: INTERNAL MEDICINE

## 2024-02-27 PROCEDURE — 3077F SYST BP >= 140 MM HG: CPT | Performed by: INTERNAL MEDICINE

## 2024-03-10 LAB
QT INTERVAL: 389 MS
QTC INTERVAL: 458 MS

## 2024-03-14 ENCOUNTER — OFFICE VISIT (OUTPATIENT)
Dept: INTERNAL MEDICINE | Age: 74
End: 2024-03-14
Payer: MEDICARE

## 2024-03-14 VITALS
BODY MASS INDEX: 16.73 KG/M2 | OXYGEN SATURATION: 97 % | TEMPERATURE: 97.8 F | WEIGHT: 88.6 LBS | SYSTOLIC BLOOD PRESSURE: 156 MMHG | HEART RATE: 79 BPM | HEIGHT: 61 IN | DIASTOLIC BLOOD PRESSURE: 80 MMHG

## 2024-03-14 DIAGNOSIS — J43.1 PANLOBULAR EMPHYSEMA: ICD-10-CM

## 2024-03-14 DIAGNOSIS — I10 PRIMARY HYPERTENSION: ICD-10-CM

## 2024-03-14 DIAGNOSIS — F41.0 PANIC ATTACKS: ICD-10-CM

## 2024-03-14 DIAGNOSIS — R63.4 WEIGHT LOSS: ICD-10-CM

## 2024-03-14 DIAGNOSIS — F33.1 MODERATE EPISODE OF RECURRENT MAJOR DEPRESSIVE DISORDER: Primary | ICD-10-CM

## 2024-03-14 RX ORDER — MIRTAZAPINE 7.5 MG/1
7.5 TABLET, FILM COATED ORAL NIGHTLY
Qty: 30 TABLET | Refills: 1 | Status: SHIPPED | OUTPATIENT
Start: 2024-03-14

## 2024-03-14 RX ORDER — LORAZEPAM 0.5 MG/1
TABLET ORAL
Qty: 90 TABLET | Refills: 1 | Status: SHIPPED | OUTPATIENT
Start: 2024-03-14

## 2024-03-21 ENCOUNTER — TELEPHONE (OUTPATIENT)
Dept: INTERNAL MEDICINE | Age: 74
End: 2024-03-21
Payer: MEDICARE

## 2024-03-21 DIAGNOSIS — I10 PRIMARY HYPERTENSION: Primary | ICD-10-CM

## 2024-03-21 DIAGNOSIS — Z00.00 WELL ADULT EXAM: ICD-10-CM

## 2024-03-21 DIAGNOSIS — E78.2 MIXED HYPERLIPIDEMIA: ICD-10-CM

## 2024-03-22 ENCOUNTER — TELEPHONE (OUTPATIENT)
Dept: GASTROENTEROLOGY | Facility: CLINIC | Age: 74
End: 2024-03-22
Payer: MEDICARE

## 2024-03-25 ENCOUNTER — OFFICE VISIT (OUTPATIENT)
Dept: INTERNAL MEDICINE | Age: 74
End: 2024-03-25
Payer: MEDICARE

## 2024-03-25 VITALS
HEART RATE: 85 BPM | DIASTOLIC BLOOD PRESSURE: 62 MMHG | BODY MASS INDEX: 16.35 KG/M2 | HEIGHT: 61 IN | OXYGEN SATURATION: 98 % | WEIGHT: 86.6 LBS | SYSTOLIC BLOOD PRESSURE: 138 MMHG | TEMPERATURE: 97.3 F

## 2024-03-25 DIAGNOSIS — K11.7 XEROSTOMIA: ICD-10-CM

## 2024-03-25 DIAGNOSIS — K59.04 CHRONIC IDIOPATHIC CONSTIPATION: ICD-10-CM

## 2024-03-25 DIAGNOSIS — F33.1 MODERATE EPISODE OF RECURRENT MAJOR DEPRESSIVE DISORDER: ICD-10-CM

## 2024-03-25 DIAGNOSIS — I10 PRIMARY HYPERTENSION: Primary | ICD-10-CM

## 2024-03-25 DIAGNOSIS — Z00.00 WELL ADULT EXAM: ICD-10-CM

## 2024-03-25 RX ORDER — MIRTAZAPINE 7.5 MG/1
7.5 TABLET, FILM COATED ORAL NIGHTLY
Qty: 90 TABLET | Refills: 3 | Status: SHIPPED | OUTPATIENT
Start: 2024-03-25

## 2024-03-25 RX ORDER — FAMOTIDINE 20 MG/1
20 TABLET, FILM COATED ORAL 2 TIMES DAILY
Qty: 90 TABLET | Refills: 1 | Status: SHIPPED | OUTPATIENT
Start: 2024-03-25

## 2024-03-25 RX ORDER — PANTOPRAZOLE SODIUM 20 MG/1
40 TABLET, DELAYED RELEASE ORAL DAILY
Qty: 90 TABLET | Refills: 1 | Status: SHIPPED | OUTPATIENT
Start: 2024-03-25

## 2024-04-11 ENCOUNTER — OFFICE VISIT (OUTPATIENT)
Dept: GASTROENTEROLOGY | Facility: CLINIC | Age: 74
End: 2024-04-11
Payer: MEDICARE

## 2024-04-11 VITALS
BODY MASS INDEX: 18.28 KG/M2 | WEIGHT: 96.8 LBS | HEART RATE: 92 BPM | HEIGHT: 61 IN | DIASTOLIC BLOOD PRESSURE: 77 MMHG | SYSTOLIC BLOOD PRESSURE: 154 MMHG

## 2024-04-11 DIAGNOSIS — Z80.0 FH: COLON CANCER: ICD-10-CM

## 2024-04-11 DIAGNOSIS — R13.10 DYSPHAGIA, UNSPECIFIED TYPE: ICD-10-CM

## 2024-04-11 DIAGNOSIS — R10.10 PAIN OF UPPER ABDOMEN: Primary | ICD-10-CM

## 2024-04-11 DIAGNOSIS — K21.00 GASTROESOPHAGEAL REFLUX DISEASE WITH ESOPHAGITIS WITHOUT HEMORRHAGE: ICD-10-CM

## 2024-04-11 DIAGNOSIS — K59.00 CONSTIPATION, UNSPECIFIED CONSTIPATION TYPE: ICD-10-CM

## 2024-04-11 PROCEDURE — 1160F RVW MEDS BY RX/DR IN RCRD: CPT | Performed by: NURSE PRACTITIONER

## 2024-04-11 PROCEDURE — 1159F MED LIST DOCD IN RCRD: CPT | Performed by: NURSE PRACTITIONER

## 2024-04-11 PROCEDURE — 3077F SYST BP >= 140 MM HG: CPT | Performed by: NURSE PRACTITIONER

## 2024-04-11 PROCEDURE — 99214 OFFICE O/P EST MOD 30 MIN: CPT | Performed by: NURSE PRACTITIONER

## 2024-04-11 PROCEDURE — 3078F DIAST BP <80 MM HG: CPT | Performed by: NURSE PRACTITIONER

## 2024-04-16 ENCOUNTER — LAB (OUTPATIENT)
Dept: LAB | Facility: HOSPITAL | Age: 74
End: 2024-04-16
Payer: MEDICARE

## 2024-04-16 DIAGNOSIS — E78.2 MIXED HYPERLIPIDEMIA: ICD-10-CM

## 2024-04-16 DIAGNOSIS — Z00.00 WELL ADULT EXAM: ICD-10-CM

## 2024-04-16 DIAGNOSIS — I10 PRIMARY HYPERTENSION: ICD-10-CM

## 2024-04-16 LAB
ALBUMIN SERPL-MCNC: 4.2 G/DL (ref 3.5–5.2)
ALBUMIN/GLOB SERPL: 1.3 G/DL
ALP SERPL-CCNC: 90 U/L (ref 39–117)
ALT SERPL W P-5'-P-CCNC: 17 U/L (ref 1–33)
ANION GAP SERPL CALCULATED.3IONS-SCNC: 13.7 MMOL/L (ref 5–15)
AST SERPL-CCNC: 34 U/L (ref 1–32)
BILIRUB SERPL-MCNC: 0.3 MG/DL (ref 0–1.2)
BUN SERPL-MCNC: 18 MG/DL (ref 8–23)
BUN/CREAT SERPL: 20 (ref 7–25)
CALCIUM SPEC-SCNC: 10 MG/DL (ref 8.6–10.5)
CHLORIDE SERPL-SCNC: 102 MMOL/L (ref 98–107)
CHOLEST SERPL-MCNC: 210 MG/DL (ref 0–200)
CO2 SERPL-SCNC: 29.3 MMOL/L (ref 22–29)
CREAT SERPL-MCNC: 0.9 MG/DL (ref 0.57–1)
EGFRCR SERPLBLD CKD-EPI 2021: 67.2 ML/MIN/1.73
FOLATE SERPL-MCNC: >20 NG/ML (ref 4.78–24.2)
GLOBULIN UR ELPH-MCNC: 3.3 GM/DL
GLUCOSE SERPL-MCNC: 92 MG/DL (ref 65–99)
HDLC SERPL-MCNC: 71 MG/DL (ref 40–60)
LDLC SERPL CALC-MCNC: 121 MG/DL (ref 0–100)
LDLC/HDLC SERPL: 1.67 {RATIO}
PHOSPHATE SERPL-MCNC: 3.8 MG/DL (ref 2.5–4.5)
POTASSIUM SERPL-SCNC: 4 MMOL/L (ref 3.5–5.2)
PROT SERPL-MCNC: 7.5 G/DL (ref 6–8.5)
SODIUM SERPL-SCNC: 145 MMOL/L (ref 136–145)
T4 FREE SERPL-MCNC: 1.2 NG/DL (ref 0.93–1.7)
TRIGL SERPL-MCNC: 102 MG/DL (ref 0–150)
TSH SERPL DL<=0.05 MIU/L-ACNC: 1.89 UIU/ML (ref 0.27–4.2)
VIT B12 BLD-MCNC: 733 PG/ML (ref 211–946)
VLDLC SERPL-MCNC: 18 MG/DL (ref 5–40)

## 2024-04-16 PROCEDURE — 82746 ASSAY OF FOLIC ACID SERUM: CPT

## 2024-04-16 PROCEDURE — 84439 ASSAY OF FREE THYROXINE: CPT

## 2024-04-16 PROCEDURE — 86235 NUCLEAR ANTIGEN ANTIBODY: CPT

## 2024-04-16 PROCEDURE — 84443 ASSAY THYROID STIM HORMONE: CPT

## 2024-04-16 PROCEDURE — 80061 LIPID PANEL: CPT

## 2024-04-16 PROCEDURE — 82607 VITAMIN B-12: CPT

## 2024-04-16 PROCEDURE — 84100 ASSAY OF PHOSPHORUS: CPT

## 2024-04-16 PROCEDURE — 36415 COLL VENOUS BLD VENIPUNCTURE: CPT

## 2024-04-16 PROCEDURE — 80053 COMPREHEN METABOLIC PANEL: CPT

## 2024-04-18 LAB
ENA SS-A AB SER-ACNC: <0.2 AI (ref 0–0.9)
ENA SS-B AB SER-ACNC: <0.2 AI (ref 0–0.9)

## 2024-04-22 RX ORDER — PANTOPRAZOLE SODIUM 20 MG/1
40 TABLET, DELAYED RELEASE ORAL DAILY
Qty: 90 TABLET | Refills: 1 | Status: SHIPPED | OUTPATIENT
Start: 2024-04-22

## 2024-04-24 ENCOUNTER — TELEPHONE (OUTPATIENT)
Dept: GASTROENTEROLOGY | Facility: CLINIC | Age: 74
End: 2024-04-24
Payer: MEDICARE

## 2024-04-25 ENCOUNTER — OFFICE VISIT (OUTPATIENT)
Dept: INTERNAL MEDICINE | Age: 74
End: 2024-04-25
Payer: MEDICARE

## 2024-04-25 VITALS
HEIGHT: 61 IN | WEIGHT: 87 LBS | OXYGEN SATURATION: 96 % | DIASTOLIC BLOOD PRESSURE: 70 MMHG | HEART RATE: 82 BPM | TEMPERATURE: 97.7 F | SYSTOLIC BLOOD PRESSURE: 140 MMHG | BODY MASS INDEX: 16.42 KG/M2

## 2024-04-25 DIAGNOSIS — R00.2 PALPITATIONS: Primary | ICD-10-CM

## 2024-04-25 DIAGNOSIS — E78.2 MIXED HYPERLIPIDEMIA: ICD-10-CM

## 2024-04-25 DIAGNOSIS — R63.4 WEIGHT LOSS: ICD-10-CM

## 2024-04-25 DIAGNOSIS — F33.1 MODERATE EPISODE OF RECURRENT MAJOR DEPRESSIVE DISORDER: ICD-10-CM

## 2024-04-25 DIAGNOSIS — I10 PRIMARY HYPERTENSION: ICD-10-CM

## 2024-04-25 DIAGNOSIS — F41.0 PANIC ATTACKS: ICD-10-CM

## 2024-04-25 DIAGNOSIS — R13.12 OROPHARYNGEAL DYSPHAGIA: ICD-10-CM

## 2024-04-25 DIAGNOSIS — K11.7 XEROSTOMIA: ICD-10-CM

## 2024-04-25 PROCEDURE — 3077F SYST BP >= 140 MM HG: CPT | Performed by: INTERNAL MEDICINE

## 2024-04-25 PROCEDURE — 3078F DIAST BP <80 MM HG: CPT | Performed by: INTERNAL MEDICINE

## 2024-04-25 PROCEDURE — 1159F MED LIST DOCD IN RCRD: CPT | Performed by: INTERNAL MEDICINE

## 2024-04-25 PROCEDURE — 1160F RVW MEDS BY RX/DR IN RCRD: CPT | Performed by: INTERNAL MEDICINE

## 2024-04-25 PROCEDURE — 99214 OFFICE O/P EST MOD 30 MIN: CPT | Performed by: INTERNAL MEDICINE

## 2024-04-25 RX ORDER — SUCRALFATE 1 G/1
1 TABLET ORAL DAILY
COMMUNITY

## 2024-04-25 RX ORDER — LORAZEPAM 0.5 MG/1
TABLET ORAL
Qty: 270 TABLET | Refills: 1 | Status: SHIPPED | OUTPATIENT
Start: 2024-04-25

## 2024-04-29 ENCOUNTER — TELEPHONE (OUTPATIENT)
Dept: INTERNAL MEDICINE | Age: 74
End: 2024-04-29
Payer: MEDICARE

## 2024-05-14 ENCOUNTER — TELEPHONE (OUTPATIENT)
Dept: GASTROENTEROLOGY | Facility: CLINIC | Age: 74
End: 2024-05-14
Payer: MEDICARE

## 2024-05-14 RX ORDER — SUCRALFATE 1 G/1
1 TABLET ORAL DAILY
Qty: 120 TABLET | Refills: 3 | Status: SHIPPED | OUTPATIENT
Start: 2024-05-14

## 2024-05-14 RX ORDER — FAMOTIDINE 20 MG/1
20 TABLET, FILM COATED ORAL 2 TIMES DAILY
Qty: 90 TABLET | Refills: 3 | Status: SHIPPED | OUTPATIENT
Start: 2024-05-14

## 2024-06-03 RX ORDER — PANTOPRAZOLE SODIUM 40 MG/1
40 TABLET, DELAYED RELEASE ORAL DAILY
COMMUNITY
Start: 2024-04-29 | End: 2024-06-03 | Stop reason: SDUPTHER

## 2024-06-04 RX ORDER — PANTOPRAZOLE SODIUM 40 MG/1
40 TABLET, DELAYED RELEASE ORAL DAILY
Qty: 90 TABLET | Refills: 3 | Status: SHIPPED | OUTPATIENT
Start: 2024-06-04

## 2024-06-12 ENCOUNTER — OFFICE VISIT (OUTPATIENT)
Dept: CARDIOLOGY | Facility: CLINIC | Age: 74
End: 2024-06-12
Payer: MEDICARE

## 2024-06-12 VITALS
DIASTOLIC BLOOD PRESSURE: 72 MMHG | HEIGHT: 61 IN | HEART RATE: 86 BPM | SYSTOLIC BLOOD PRESSURE: 163 MMHG | BODY MASS INDEX: 16.09 KG/M2 | WEIGHT: 85.2 LBS

## 2024-06-12 DIAGNOSIS — R00.2 PALPITATIONS: Primary | ICD-10-CM

## 2024-06-12 DIAGNOSIS — I10 PRIMARY HYPERTENSION: ICD-10-CM

## 2024-06-12 PROCEDURE — 99204 OFFICE O/P NEW MOD 45 MIN: CPT | Performed by: INTERNAL MEDICINE

## 2024-06-12 PROCEDURE — 3077F SYST BP >= 140 MM HG: CPT | Performed by: INTERNAL MEDICINE

## 2024-06-12 PROCEDURE — 3078F DIAST BP <80 MM HG: CPT | Performed by: INTERNAL MEDICINE

## 2024-06-13 ENCOUNTER — OFFICE VISIT (OUTPATIENT)
Dept: GASTROENTEROLOGY | Facility: CLINIC | Age: 74
End: 2024-06-13
Payer: MEDICARE

## 2024-06-13 VITALS
DIASTOLIC BLOOD PRESSURE: 62 MMHG | HEIGHT: 61 IN | BODY MASS INDEX: 16.35 KG/M2 | HEART RATE: 67 BPM | SYSTOLIC BLOOD PRESSURE: 147 MMHG | WEIGHT: 86.6 LBS

## 2024-06-13 DIAGNOSIS — K21.00 GASTROESOPHAGEAL REFLUX DISEASE WITH ESOPHAGITIS WITHOUT HEMORRHAGE: Primary | ICD-10-CM

## 2024-06-13 DIAGNOSIS — K59.03 DRUG-INDUCED CONSTIPATION: ICD-10-CM

## 2024-06-13 DIAGNOSIS — R10.10 PAIN OF UPPER ABDOMEN: ICD-10-CM

## 2024-06-13 DIAGNOSIS — R11.0 NAUSEA: ICD-10-CM

## 2024-06-13 PROCEDURE — 99214 OFFICE O/P EST MOD 30 MIN: CPT | Performed by: NURSE PRACTITIONER

## 2024-06-13 PROCEDURE — 1160F RVW MEDS BY RX/DR IN RCRD: CPT | Performed by: NURSE PRACTITIONER

## 2024-06-13 PROCEDURE — 3078F DIAST BP <80 MM HG: CPT | Performed by: NURSE PRACTITIONER

## 2024-06-13 PROCEDURE — 1159F MED LIST DOCD IN RCRD: CPT | Performed by: NURSE PRACTITIONER

## 2024-06-13 PROCEDURE — 3077F SYST BP >= 140 MM HG: CPT | Performed by: NURSE PRACTITIONER

## 2024-06-25 ENCOUNTER — HOSPITAL ENCOUNTER (OUTPATIENT)
Dept: CT IMAGING | Facility: HOSPITAL | Age: 74
Discharge: HOME | End: 2024-06-25
Admitting: NURSE PRACTITIONER
Payer: MEDICARE

## 2024-06-25 DIAGNOSIS — K59.03 DRUG-INDUCED CONSTIPATION: ICD-10-CM

## 2024-06-25 DIAGNOSIS — R10.10 PAIN OF UPPER ABDOMEN: ICD-10-CM

## 2024-06-25 DIAGNOSIS — R11.0 NAUSEA: ICD-10-CM

## 2024-06-25 DIAGNOSIS — K21.00 GASTROESOPHAGEAL REFLUX DISEASE WITH ESOPHAGITIS WITHOUT HEMORRHAGE: ICD-10-CM

## 2024-06-25 PROCEDURE — 74150 CT ABDOMEN W/O CONTRAST: CPT

## 2024-06-27 ENCOUNTER — TELEPHONE (OUTPATIENT)
Dept: GASTROENTEROLOGY | Facility: CLINIC | Age: 74
End: 2024-06-27
Payer: MEDICARE

## 2024-06-27 ENCOUNTER — TELEPHONE (OUTPATIENT)
Dept: INTERNAL MEDICINE | Age: 74
End: 2024-06-27

## 2024-06-27 DIAGNOSIS — R93.89 ABNORMAL CT SCAN, CHEST: Primary | ICD-10-CM

## 2024-06-28 ENCOUNTER — TELEPHONE (OUTPATIENT)
Dept: INTERNAL MEDICINE | Age: 74
End: 2024-06-28
Payer: MEDICARE

## 2024-07-01 PROCEDURE — 87116 MYCOBACTERIA CULTURE: CPT | Performed by: INTERNAL MEDICINE

## 2024-07-01 PROCEDURE — 87206 SMEAR FLUORESCENT/ACID STAI: CPT | Performed by: INTERNAL MEDICINE

## 2024-07-03 ENCOUNTER — TELEPHONE (OUTPATIENT)
Dept: INTERNAL MEDICINE | Age: 74
End: 2024-07-03
Payer: MEDICARE

## 2024-07-05 ENCOUNTER — TELEPHONE (OUTPATIENT)
Dept: CARDIOLOGY | Facility: CLINIC | Age: 74
End: 2024-07-05
Payer: MEDICARE

## 2024-07-08 ENCOUNTER — TELEPHONE (OUTPATIENT)
Dept: INTERNAL MEDICINE | Age: 74
End: 2024-07-08
Payer: MEDICARE

## 2024-07-08 DIAGNOSIS — R91.8 ABNORMAL CT SCAN OF LUNG: Primary | ICD-10-CM

## 2024-07-09 ENCOUNTER — HOSPITAL ENCOUNTER (OUTPATIENT)
Dept: CARDIOLOGY | Facility: HOSPITAL | Age: 74
Discharge: HOME | End: 2024-07-09
Admitting: INTERNAL MEDICINE
Payer: MEDICARE

## 2024-07-09 DIAGNOSIS — R00.2 PALPITATIONS: ICD-10-CM

## 2024-07-09 PROCEDURE — 93306 TTE W/DOPPLER COMPLETE: CPT

## 2024-07-10 ENCOUNTER — LAB (OUTPATIENT)
Dept: LAB | Facility: HOSPITAL | Age: 74
End: 2024-07-10
Payer: MEDICARE

## 2024-07-10 DIAGNOSIS — R91.8 ABNORMAL CT SCAN OF LUNG: ICD-10-CM

## 2024-07-10 LAB
ALBUMIN SERPL-MCNC: 4.2 G/DL (ref 3.5–5.2)
ALBUMIN/GLOB SERPL: 1 G/DL
ALP SERPL-CCNC: 112 U/L (ref 39–117)
ALT SERPL W P-5'-P-CCNC: 20 U/L (ref 1–33)
ANION GAP SERPL CALCULATED.3IONS-SCNC: 13.6 MMOL/L (ref 5–15)
AORTIC DIMENSIONLESS INDEX: 0.84 (DI)
ASCENDING AORTA: 2.5 CM
AST SERPL-CCNC: 35 U/L (ref 1–32)
BASOPHILS # BLD AUTO: 0.05 10*3/MM3 (ref 0–0.2)
BASOPHILS NFR BLD AUTO: 0.8 % (ref 0–1.5)
BH CV ECHO MEAS - ACS: 1.3 CM
BH CV ECHO MEAS - AI P1/2T: 611.7 MSEC
BH CV ECHO MEAS - AO MAX PG: 8.3 MMHG
BH CV ECHO MEAS - AO MEAN PG: 4 MMHG
BH CV ECHO MEAS - AO ROOT DIAM: 2.6 CM
BH CV ECHO MEAS - AO V2 MAX: 144 CM/SEC
BH CV ECHO MEAS - AO V2 VTI: 30.2 CM
BH CV ECHO MEAS - AVA(I,D): 1.9 CM2
BH CV ECHO MEAS - EDV(CUBED): 33.4 ML
BH CV ECHO MEAS - EDV(MOD-SP2): 48.1 ML
BH CV ECHO MEAS - EDV(MOD-SP4): 46.1 ML
BH CV ECHO MEAS - EF(MOD-BP): 60.2 %
BH CV ECHO MEAS - EF(MOD-SP2): 60.3 %
BH CV ECHO MEAS - EF(MOD-SP4): 63.1 %
BH CV ECHO MEAS - ESV(CUBED): 10.1 ML
BH CV ECHO MEAS - ESV(MOD-SP2): 19.1 ML
BH CV ECHO MEAS - ESV(MOD-SP4): 17 ML
BH CV ECHO MEAS - FS: 32.9 %
BH CV ECHO MEAS - IVS/LVPW: 0.74 CM
BH CV ECHO MEAS - IVSD: 0.68 CM
BH CV ECHO MEAS - LA DIMENSION: 2.3 CM
BH CV ECHO MEAS - LAT PEAK E' VEL: 11.8 CM/SEC
BH CV ECHO MEAS - LV MASS(C)D: 65 GRAMS
BH CV ECHO MEAS - LV MAX PG: 7.6 MMHG
BH CV ECHO MEAS - LV MEAN PG: 2 MMHG
BH CV ECHO MEAS - LV V1 MAX: 138 CM/SEC
BH CV ECHO MEAS - LV V1 VTI: 25.3 CM
BH CV ECHO MEAS - LVIDD: 3.2 CM
BH CV ECHO MEAS - LVIDS: 2.16 CM
BH CV ECHO MEAS - LVOT AREA: 2.27 CM2
BH CV ECHO MEAS - LVOT DIAM: 1.7 CM
BH CV ECHO MEAS - LVPWD: 0.91 CM
BH CV ECHO MEAS - MED PEAK E' VEL: 9.6 CM/SEC
BH CV ECHO MEAS - MV A MAX VEL: 101 CM/SEC
BH CV ECHO MEAS - MV DEC SLOPE: 308 CM/SEC2
BH CV ECHO MEAS - MV DEC TIME: 0.32 SEC
BH CV ECHO MEAS - MV E MAX VEL: 92.4 CM/SEC
BH CV ECHO MEAS - MV E/A: 0.91
BH CV ECHO MEAS - MV MEAN PG: 2 MMHG
BH CV ECHO MEAS - MV P1/2T: 96 MSEC
BH CV ECHO MEAS - MV V2 VTI: 31.9 CM
BH CV ECHO MEAS - MVA(P1/2T): 2.29 CM2
BH CV ECHO MEAS - MVA(VTI): 1.8 CM2
BH CV ECHO MEAS - PA V2 MAX: 71.1 CM/SEC
BH CV ECHO MEAS - PULM A REVS DUR: 0.12 SEC
BH CV ECHO MEAS - PULM A REVS VEL: 27.9 CM/SEC
BH CV ECHO MEAS - PULM DIAS VEL: 60.7 CM/SEC
BH CV ECHO MEAS - PULM S/D: 1.51
BH CV ECHO MEAS - PULM SYS VEL: 91.9 CM/SEC
BH CV ECHO MEAS - QP/QS: 0.53
BH CV ECHO MEAS - RAP SYSTOLE: 5 MMHG
BH CV ECHO MEAS - RV MAX PG: 2 MMHG
BH CV ECHO MEAS - RV V1 MAX: 70.7 CM/SEC
BH CV ECHO MEAS - RV V1 VTI: 15 CM
BH CV ECHO MEAS - RVDD: 2.08 CM
BH CV ECHO MEAS - RVOT DIAM: 1.6 CM
BH CV ECHO MEAS - RVSP: 38.4 MMHG
BH CV ECHO MEAS - SV(LVOT): 57.4 ML
BH CV ECHO MEAS - SV(MOD-SP2): 29 ML
BH CV ECHO MEAS - SV(MOD-SP4): 29.1 ML
BH CV ECHO MEAS - SV(RVOT): 30.2 ML
BH CV ECHO MEAS - TAPSE (>1.6): 1.77 CM
BH CV ECHO MEAS - TR MAX PG: 33.4 MMHG
BH CV ECHO MEAS - TR MAX VEL: 289 CM/SEC
BH CV ECHO MEASUREMENTS AVERAGE E/E' RATIO: 8.64
BH CV XLRA - TDI S': 15.4 CM/SEC
BILIRUB SERPL-MCNC: 0.2 MG/DL (ref 0–1.2)
BUN SERPL-MCNC: 15 MG/DL (ref 8–23)
BUN/CREAT SERPL: 16.9 (ref 7–25)
CALCIUM SPEC-SCNC: 9.8 MG/DL (ref 8.6–10.5)
CHLORIDE SERPL-SCNC: 101 MMOL/L (ref 98–107)
CO2 SERPL-SCNC: 27.4 MMOL/L (ref 22–29)
CREAT SERPL-MCNC: 0.89 MG/DL (ref 0.57–1)
DEPRECATED RDW RBC AUTO: 42.9 FL (ref 37–54)
EGFRCR SERPLBLD CKD-EPI 2021: 67.7 ML/MIN/1.73
EOSINOPHIL # BLD AUTO: 0.07 10*3/MM3 (ref 0–0.4)
EOSINOPHIL NFR BLD AUTO: 1.1 % (ref 0.3–6.2)
ERYTHROCYTE [DISTWIDTH] IN BLOOD BY AUTOMATED COUNT: 12.1 % (ref 12.3–15.4)
GLOBULIN UR ELPH-MCNC: 4.2 GM/DL
GLUCOSE SERPL-MCNC: 86 MG/DL (ref 65–99)
HCT VFR BLD AUTO: 45.9 % (ref 34–46.6)
HGB BLD-MCNC: 14.8 G/DL (ref 12–15.9)
IMM GRANULOCYTES # BLD AUTO: 0.01 10*3/MM3 (ref 0–0.05)
IMM GRANULOCYTES NFR BLD AUTO: 0.2 % (ref 0–0.5)
IVRT: 67 MS
LEFT ATRIUM VOLUME INDEX: 12 ML/M2
LYMPHOCYTES # BLD AUTO: 2.53 10*3/MM3 (ref 0.7–3.1)
LYMPHOCYTES NFR BLD AUTO: 38.9 % (ref 19.6–45.3)
MCH RBC QN AUTO: 31 PG (ref 26.6–33)
MCHC RBC AUTO-ENTMCNC: 32.2 G/DL (ref 31.5–35.7)
MCV RBC AUTO: 96 FL (ref 79–97)
MONOCYTES # BLD AUTO: 0.48 10*3/MM3 (ref 0.1–0.9)
MONOCYTES NFR BLD AUTO: 7.4 % (ref 5–12)
NEUTROPHILS NFR BLD AUTO: 3.37 10*3/MM3 (ref 1.7–7)
NEUTROPHILS NFR BLD AUTO: 51.6 % (ref 42.7–76)
NRBC BLD AUTO-RTO: 0 /100 WBC (ref 0–0.2)
PLATELET # BLD AUTO: 330 10*3/MM3 (ref 140–450)
PMV BLD AUTO: 10.3 FL (ref 6–12)
POTASSIUM SERPL-SCNC: 4.2 MMOL/L (ref 3.5–5.2)
PROT SERPL-MCNC: 8.4 G/DL (ref 6–8.5)
RBC # BLD AUTO: 4.78 10*6/MM3 (ref 3.77–5.28)
SODIUM SERPL-SCNC: 142 MMOL/L (ref 136–145)
WBC NRBC COR # BLD AUTO: 6.51 10*3/MM3 (ref 3.4–10.8)

## 2024-07-10 PROCEDURE — 80053 COMPREHEN METABOLIC PANEL: CPT

## 2024-07-10 PROCEDURE — 85025 COMPLETE CBC W/AUTO DIFF WBC: CPT

## 2024-07-10 PROCEDURE — 87040 BLOOD CULTURE FOR BACTERIA: CPT

## 2024-07-10 PROCEDURE — 36415 COLL VENOUS BLD VENIPUNCTURE: CPT

## 2024-07-11 ENCOUNTER — TELEPHONE (OUTPATIENT)
Dept: CARDIOLOGY | Facility: CLINIC | Age: 74
End: 2024-07-11
Payer: MEDICARE

## 2024-07-12 ENCOUNTER — TELEPHONE (OUTPATIENT)
Dept: INTERNAL MEDICINE | Age: 74
End: 2024-07-12

## 2024-07-15 ENCOUNTER — TELEPHONE (OUTPATIENT)
Dept: INTERNAL MEDICINE | Age: 74
End: 2024-07-15
Payer: MEDICARE

## 2024-07-15 DIAGNOSIS — R93.89 NODULAR RADIOLOGIC DENSITY: Primary | ICD-10-CM

## 2024-07-15 LAB
BACTERIA SPEC AEROBE CULT: NORMAL
BACTERIA SPEC AEROBE CULT: NORMAL

## 2024-07-16 ENCOUNTER — TELEPHONE (OUTPATIENT)
Dept: GASTROENTEROLOGY | Facility: CLINIC | Age: 74
End: 2024-07-16
Payer: MEDICARE

## 2024-07-16 ENCOUNTER — TELEPHONE (OUTPATIENT)
Dept: INTERNAL MEDICINE | Age: 74
End: 2024-07-16

## 2024-07-16 LAB
MYCOBACTERIUM SPEC CULT: ABNORMAL
NIGHT BLUE STAIN TISS: ABNORMAL

## 2024-07-24 ENCOUNTER — HOSPITAL ENCOUNTER (OUTPATIENT)
Dept: CT IMAGING | Facility: HOSPITAL | Age: 74
Discharge: HOME | End: 2024-07-24
Admitting: INTERNAL MEDICINE
Payer: MEDICARE

## 2024-07-24 DIAGNOSIS — R93.89 ABNORMAL CT SCAN, CHEST: ICD-10-CM

## 2024-07-24 PROCEDURE — 71250 CT THORAX DX C-: CPT

## 2024-07-25 ENCOUNTER — OFFICE VISIT (OUTPATIENT)
Dept: INTERNAL MEDICINE | Age: 74
End: 2024-07-25
Payer: MEDICARE

## 2024-07-25 VITALS
BODY MASS INDEX: 16.09 KG/M2 | SYSTOLIC BLOOD PRESSURE: 100 MMHG | HEART RATE: 86 BPM | TEMPERATURE: 97.3 F | OXYGEN SATURATION: 96 % | WEIGHT: 85.2 LBS | HEIGHT: 61 IN | DIASTOLIC BLOOD PRESSURE: 72 MMHG

## 2024-07-25 DIAGNOSIS — Z00.00 MEDICARE ANNUAL WELLNESS VISIT, SUBSEQUENT: Primary | ICD-10-CM

## 2024-07-25 DIAGNOSIS — J43.1 PANLOBULAR EMPHYSEMA: ICD-10-CM

## 2024-07-25 DIAGNOSIS — R63.4 WEIGHT LOSS: ICD-10-CM

## 2024-07-25 DIAGNOSIS — R89.9 ACID-FAST BACTERIA PRESENT: ICD-10-CM

## 2024-07-25 DIAGNOSIS — F33.1 MODERATE EPISODE OF RECURRENT MAJOR DEPRESSIVE DISORDER: ICD-10-CM

## 2024-07-25 PROCEDURE — 1159F MED LIST DOCD IN RCRD: CPT | Performed by: INTERNAL MEDICINE

## 2024-07-25 PROCEDURE — 99214 OFFICE O/P EST MOD 30 MIN: CPT | Performed by: INTERNAL MEDICINE

## 2024-07-25 PROCEDURE — G0439 PPPS, SUBSEQ VISIT: HCPCS | Performed by: INTERNAL MEDICINE

## 2024-07-25 PROCEDURE — 1160F RVW MEDS BY RX/DR IN RCRD: CPT | Performed by: INTERNAL MEDICINE

## 2024-07-25 PROCEDURE — 1170F FXNL STATUS ASSESSED: CPT | Performed by: INTERNAL MEDICINE

## 2024-07-25 PROCEDURE — 3078F DIAST BP <80 MM HG: CPT | Performed by: INTERNAL MEDICINE

## 2024-07-25 PROCEDURE — 1126F AMNT PAIN NOTED NONE PRSNT: CPT | Performed by: INTERNAL MEDICINE

## 2024-07-25 PROCEDURE — 3074F SYST BP LT 130 MM HG: CPT | Performed by: INTERNAL MEDICINE

## 2024-07-25 RX ORDER — MEGESTROL ACETATE 40 MG/ML
SUSPENSION ORAL
Qty: 1800 ML | Refills: 1 | Status: SHIPPED | OUTPATIENT
Start: 2024-07-25

## 2024-07-31 ENCOUNTER — OFFICE VISIT (OUTPATIENT)
Dept: PULMONOLOGY | Facility: CLINIC | Age: 74
End: 2024-07-31
Payer: MEDICARE

## 2024-07-31 VITALS
HEIGHT: 61 IN | BODY MASS INDEX: 15.86 KG/M2 | TEMPERATURE: 98 F | HEART RATE: 87 BPM | DIASTOLIC BLOOD PRESSURE: 77 MMHG | WEIGHT: 84 LBS | RESPIRATION RATE: 18 BRPM | OXYGEN SATURATION: 97 % | SYSTOLIC BLOOD PRESSURE: 171 MMHG

## 2024-07-31 DIAGNOSIS — J43.1 PANLOBULAR EMPHYSEMA: Primary | ICD-10-CM

## 2024-07-31 DIAGNOSIS — R89.9 ACID-FAST BACTERIA PRESENT: ICD-10-CM

## 2024-07-31 DIAGNOSIS — R93.89 ABNORMAL CHEST CT: ICD-10-CM

## 2024-07-31 PROCEDURE — 3077F SYST BP >= 140 MM HG: CPT | Performed by: STUDENT IN AN ORGANIZED HEALTH CARE EDUCATION/TRAINING PROGRAM

## 2024-07-31 PROCEDURE — 3078F DIAST BP <80 MM HG: CPT | Performed by: STUDENT IN AN ORGANIZED HEALTH CARE EDUCATION/TRAINING PROGRAM

## 2024-07-31 PROCEDURE — 99204 OFFICE O/P NEW MOD 45 MIN: CPT | Performed by: STUDENT IN AN ORGANIZED HEALTH CARE EDUCATION/TRAINING PROGRAM

## 2024-07-31 RX ORDER — RIFAMPIN 300 MG/1
300 CAPSULE ORAL 2 TIMES DAILY
Qty: 30 CAPSULE | Refills: 5 | Status: SHIPPED | OUTPATIENT
Start: 2024-07-31

## 2024-07-31 RX ORDER — ETHAMBUTOL HYDROCHLORIDE 400 MG/1
600 TABLET, FILM COATED ORAL DAILY
Qty: 30 TABLET | Refills: 9 | Status: SHIPPED | OUTPATIENT
Start: 2024-07-31

## 2024-07-31 RX ORDER — AZITHROMYCIN 250 MG/1
250 TABLET, FILM COATED ORAL 3 TIMES WEEKLY
Qty: 30 TABLET | Refills: 11 | Status: SHIPPED | OUTPATIENT
Start: 2024-07-31

## 2024-08-02 ENCOUNTER — TELEPHONE (OUTPATIENT)
Dept: PULMONOLOGY | Facility: CLINIC | Age: 74
End: 2024-08-02
Payer: MEDICARE

## 2024-08-05 ENCOUNTER — TELEPHONE (OUTPATIENT)
Dept: INTERNAL MEDICINE | Age: 74
End: 2024-08-05
Payer: MEDICARE

## 2024-08-06 ENCOUNTER — TELEPHONE (OUTPATIENT)
Dept: INTERNAL MEDICINE | Age: 74
End: 2024-08-06
Payer: MEDICARE

## 2024-08-07 ENCOUNTER — TELEPHONE (OUTPATIENT)
Dept: INTERNAL MEDICINE | Age: 74
End: 2024-08-07
Payer: MEDICARE

## 2024-08-07 ENCOUNTER — OFFICE VISIT (OUTPATIENT)
Dept: PULMONOLOGY | Facility: CLINIC | Age: 74
End: 2024-08-07
Payer: MEDICARE

## 2024-08-07 VITALS
HEART RATE: 68 BPM | WEIGHT: 83 LBS | BODY MASS INDEX: 15.67 KG/M2 | HEIGHT: 61 IN | SYSTOLIC BLOOD PRESSURE: 150 MMHG | RESPIRATION RATE: 16 BRPM | OXYGEN SATURATION: 95 % | TEMPERATURE: 98.7 F | DIASTOLIC BLOOD PRESSURE: 68 MMHG

## 2024-08-07 DIAGNOSIS — Z51.81 MEDICATION MONITORING ENCOUNTER: Primary | ICD-10-CM

## 2024-08-07 DIAGNOSIS — A31.0 MYCOBACTERIUM AVIUM COMPLEX: ICD-10-CM

## 2024-08-07 DIAGNOSIS — F17.201 TOBACCO ABUSE, IN REMISSION: ICD-10-CM

## 2024-08-07 DIAGNOSIS — R93.89 ABNORMAL CT SCAN, CHEST: ICD-10-CM

## 2024-08-07 DIAGNOSIS — R11.0 NAUSEA: ICD-10-CM

## 2024-08-07 DIAGNOSIS — R05.9 COUGH, UNSPECIFIED TYPE: ICD-10-CM

## 2024-08-07 PROCEDURE — 1160F RVW MEDS BY RX/DR IN RCRD: CPT | Performed by: NURSE PRACTITIONER

## 2024-08-07 PROCEDURE — 3078F DIAST BP <80 MM HG: CPT | Performed by: NURSE PRACTITIONER

## 2024-08-07 PROCEDURE — 99214 OFFICE O/P EST MOD 30 MIN: CPT | Performed by: NURSE PRACTITIONER

## 2024-08-07 PROCEDURE — 1159F MED LIST DOCD IN RCRD: CPT | Performed by: NURSE PRACTITIONER

## 2024-08-07 PROCEDURE — 3077F SYST BP >= 140 MM HG: CPT | Performed by: NURSE PRACTITIONER

## 2024-08-07 RX ORDER — ONDANSETRON 4 MG/1
4 TABLET, FILM COATED ORAL EVERY 8 HOURS PRN
Qty: 30 TABLET | Refills: 2 | Status: CANCELLED | OUTPATIENT
Start: 2024-08-07

## 2024-08-07 RX ORDER — ONDANSETRON 4 MG/1
4 TABLET, FILM COATED ORAL EVERY 8 HOURS PRN
Qty: 30 TABLET | Refills: 2 | Status: SHIPPED | OUTPATIENT
Start: 2024-08-07

## 2024-08-09 ENCOUNTER — TELEPHONE (OUTPATIENT)
Dept: GASTROENTEROLOGY | Facility: CLINIC | Age: 74
End: 2024-08-09
Payer: MEDICARE

## 2024-08-09 ENCOUNTER — LAB (OUTPATIENT)
Dept: LAB | Facility: HOSPITAL | Age: 74
End: 2024-08-09
Payer: MEDICARE

## 2024-08-09 ENCOUNTER — HOSPITAL ENCOUNTER (OUTPATIENT)
Dept: CARDIOLOGY | Facility: HOSPITAL | Age: 74
Discharge: HOME | End: 2024-08-09
Payer: MEDICARE

## 2024-08-09 DIAGNOSIS — R93.89 ABNORMAL CHEST CT: ICD-10-CM

## 2024-08-09 DIAGNOSIS — Z51.81 MEDICATION MONITORING ENCOUNTER: ICD-10-CM

## 2024-08-09 DIAGNOSIS — R89.9 ACID-FAST BACTERIA PRESENT: ICD-10-CM

## 2024-08-09 LAB
ALBUMIN SERPL-MCNC: 4.1 G/DL (ref 3.5–5.2)
ALBUMIN/GLOB SERPL: 1 G/DL
ALP SERPL-CCNC: 126 U/L (ref 39–117)
ALT SERPL W P-5'-P-CCNC: 21 U/L (ref 1–33)
ANION GAP SERPL CALCULATED.3IONS-SCNC: 11 MMOL/L (ref 5–15)
AST SERPL-CCNC: 35 U/L (ref 1–32)
BASOPHILS # BLD AUTO: 0.03 10*3/MM3 (ref 0–0.2)
BASOPHILS NFR BLD AUTO: 0.3 % (ref 0–1.5)
BILIRUB SERPL-MCNC: 0.3 MG/DL (ref 0–1.2)
BUN SERPL-MCNC: 11 MG/DL (ref 8–23)
BUN/CREAT SERPL: 11.3 (ref 7–25)
CALCIUM SPEC-SCNC: 10.2 MG/DL (ref 8.6–10.5)
CHLORIDE SERPL-SCNC: 100 MMOL/L (ref 98–107)
CO2 SERPL-SCNC: 30 MMOL/L (ref 22–29)
CREAT SERPL-MCNC: 0.97 MG/DL (ref 0.57–1)
DEPRECATED RDW RBC AUTO: 42.4 FL (ref 37–54)
EGFRCR SERPLBLD CKD-EPI 2021: 61.1 ML/MIN/1.73
EOSINOPHIL # BLD AUTO: 0.02 10*3/MM3 (ref 0–0.4)
EOSINOPHIL NFR BLD AUTO: 0.2 % (ref 0.3–6.2)
ERYTHROCYTE [DISTWIDTH] IN BLOOD BY AUTOMATED COUNT: 12.3 % (ref 12.3–15.4)
GLOBULIN UR ELPH-MCNC: 4 GM/DL
GLUCOSE SERPL-MCNC: 130 MG/DL (ref 65–99)
HCT VFR BLD AUTO: 43.2 % (ref 34–46.6)
HGB BLD-MCNC: 13.7 G/DL (ref 12–15.9)
IMM GRANULOCYTES # BLD AUTO: 0.02 10*3/MM3 (ref 0–0.05)
IMM GRANULOCYTES NFR BLD AUTO: 0.2 % (ref 0–0.5)
LYMPHOCYTES # BLD AUTO: 2.94 10*3/MM3 (ref 0.7–3.1)
LYMPHOCYTES NFR BLD AUTO: 30.8 % (ref 19.6–45.3)
MCH RBC QN AUTO: 30.2 PG (ref 26.6–33)
MCHC RBC AUTO-ENTMCNC: 31.7 G/DL (ref 31.5–35.7)
MCV RBC AUTO: 95.2 FL (ref 79–97)
MONOCYTES # BLD AUTO: 0.68 10*3/MM3 (ref 0.1–0.9)
MONOCYTES NFR BLD AUTO: 7.1 % (ref 5–12)
NEUTROPHILS NFR BLD AUTO: 5.87 10*3/MM3 (ref 1.7–7)
NEUTROPHILS NFR BLD AUTO: 61.4 % (ref 42.7–76)
NRBC BLD AUTO-RTO: 0 /100 WBC (ref 0–0.2)
PLATELET # BLD AUTO: 330 10*3/MM3 (ref 140–450)
PMV BLD AUTO: 9.8 FL (ref 6–12)
POTASSIUM SERPL-SCNC: 4.2 MMOL/L (ref 3.5–5.2)
PROT SERPL-MCNC: 8.1 G/DL (ref 6–8.5)
RBC # BLD AUTO: 4.54 10*6/MM3 (ref 3.77–5.28)
SODIUM SERPL-SCNC: 141 MMOL/L (ref 136–145)
WBC NRBC COR # BLD AUTO: 9.56 10*3/MM3 (ref 3.4–10.8)

## 2024-08-09 PROCEDURE — 80053 COMPREHEN METABOLIC PANEL: CPT

## 2024-08-09 PROCEDURE — 85025 COMPLETE CBC W/AUTO DIFF WBC: CPT

## 2024-08-09 PROCEDURE — 36415 COLL VENOUS BLD VENIPUNCTURE: CPT

## 2024-08-09 PROCEDURE — 93005 ELECTROCARDIOGRAM TRACING: CPT | Performed by: STUDENT IN AN ORGANIZED HEALTH CARE EDUCATION/TRAINING PROGRAM

## 2024-08-10 PROCEDURE — 87206 SMEAR FLUORESCENT/ACID STAI: CPT

## 2024-08-10 PROCEDURE — 87116 MYCOBACTERIA CULTURE: CPT

## 2024-08-12 ENCOUNTER — TELEPHONE (OUTPATIENT)
Dept: INTERNAL MEDICINE | Age: 74
End: 2024-08-12
Payer: MEDICARE

## 2024-08-12 LAB
MYCOBACTERIUM SPEC CULT: NORMAL
MYCOBACTERIUM SPEC CULT: NORMAL
NIGHT BLUE STAIN TISS: NORMAL
QT INTERVAL: 386 MS
QTC INTERVAL: 454 MS

## 2024-08-13 ENCOUNTER — APPOINTMENT (OUTPATIENT)
Dept: GENERAL RADIOLOGY | Facility: HOSPITAL | Age: 74
End: 2024-08-13
Payer: MEDICARE

## 2024-08-13 ENCOUNTER — HOSPITAL ENCOUNTER (EMERGENCY)
Facility: HOSPITAL | Age: 74
Discharge: OTHER INSTITUTION | End: 2024-08-13
Attending: EMERGENCY MEDICINE
Payer: MEDICARE

## 2024-08-13 VITALS
BODY MASS INDEX: 15.61 KG/M2 | DIASTOLIC BLOOD PRESSURE: 68 MMHG | TEMPERATURE: 97.8 F | HEIGHT: 61 IN | RESPIRATION RATE: 16 BRPM | OXYGEN SATURATION: 100 % | SYSTOLIC BLOOD PRESSURE: 153 MMHG | WEIGHT: 82.67 LBS | HEART RATE: 86 BPM

## 2024-08-13 DIAGNOSIS — R45.851 DEPRESSION WITH SUICIDAL IDEATION: ICD-10-CM

## 2024-08-13 DIAGNOSIS — G47.00 INSOMNIA, UNSPECIFIED TYPE: ICD-10-CM

## 2024-08-13 DIAGNOSIS — F41.9 ANXIETY AND DEPRESSION: Primary | ICD-10-CM

## 2024-08-13 DIAGNOSIS — F32.A DEPRESSION WITH SUICIDAL IDEATION: ICD-10-CM

## 2024-08-13 DIAGNOSIS — F32.A ANXIETY AND DEPRESSION: Primary | ICD-10-CM

## 2024-08-13 LAB
ALBUMIN SERPL-MCNC: 3.6 G/DL (ref 3.5–5.2)
ALBUMIN/GLOB SERPL: 0.9 G/DL
ALP SERPL-CCNC: 100 U/L (ref 39–117)
ALT SERPL W P-5'-P-CCNC: 13 U/L (ref 1–33)
AMPHET+METHAMPHET UR QL: NEGATIVE
ANION GAP SERPL CALCULATED.3IONS-SCNC: 10.6 MMOL/L (ref 5–15)
APAP SERPL-MCNC: <5 MCG/ML (ref 0–30)
AST SERPL-CCNC: 24 U/L (ref 1–32)
BARBITURATES UR QL SCN: NEGATIVE
BASOPHILS # BLD AUTO: 0.05 10*3/MM3 (ref 0–0.2)
BASOPHILS NFR BLD AUTO: 0.6 % (ref 0–1.5)
BENZODIAZ UR QL SCN: POSITIVE
BILIRUB SERPL-MCNC: 0.2 MG/DL (ref 0–1.2)
BILIRUB UR QL STRIP: NEGATIVE
BUN SERPL-MCNC: 9 MG/DL (ref 8–23)
BUN/CREAT SERPL: 11.3 (ref 7–25)
CALCIUM SPEC-SCNC: 9.5 MG/DL (ref 8.6–10.5)
CANNABINOIDS SERPL QL: NEGATIVE
CHLORIDE SERPL-SCNC: 104 MMOL/L (ref 98–107)
CLARITY UR: CLEAR
CO2 SERPL-SCNC: 26.4 MMOL/L (ref 22–29)
COCAINE UR QL: NEGATIVE
COLOR UR: YELLOW
CREAT SERPL-MCNC: 0.8 MG/DL (ref 0.57–1)
DEPRECATED RDW RBC AUTO: 47.3 FL (ref 37–54)
EGFRCR SERPLBLD CKD-EPI 2021: 76.9 ML/MIN/1.73
EOSINOPHIL # BLD AUTO: 0.02 10*3/MM3 (ref 0–0.4)
EOSINOPHIL NFR BLD AUTO: 0.3 % (ref 0.3–6.2)
ERYTHROCYTE [DISTWIDTH] IN BLOOD BY AUTOMATED COUNT: 13.9 % (ref 12.3–15.4)
ETHANOL BLD-MCNC: <10 MG/DL (ref 0–10)
ETHANOL UR QL: <0.01 %
FENTANYL UR-MCNC: NEGATIVE NG/ML
FLUAV SUBTYP SPEC NAA+PROBE: NOT DETECTED
FLUBV RNA ISLT QL NAA+PROBE: NOT DETECTED
GLOBULIN UR ELPH-MCNC: 3.9 GM/DL
GLUCOSE SERPL-MCNC: 84 MG/DL (ref 65–99)
GLUCOSE UR STRIP-MCNC: NEGATIVE MG/DL
HCT VFR BLD AUTO: 39.4 % (ref 34–46.6)
HGB BLD-MCNC: 13.2 G/DL (ref 12–15.9)
HGB UR QL STRIP.AUTO: NEGATIVE
IMM GRANULOCYTES # BLD AUTO: 0.02 10*3/MM3 (ref 0–0.05)
IMM GRANULOCYTES NFR BLD AUTO: 0.3 % (ref 0–0.5)
KETONES UR QL STRIP: NEGATIVE
LEUKOCYTE ESTERASE UR QL STRIP.AUTO: NEGATIVE
LYMPHOCYTES # BLD AUTO: 1.86 10*3/MM3 (ref 0.7–3.1)
LYMPHOCYTES NFR BLD AUTO: 23.5 % (ref 19.6–45.3)
MAGNESIUM SERPL-MCNC: 2.4 MG/DL (ref 1.6–2.4)
MCH RBC QN AUTO: 31.1 PG (ref 26.6–33)
MCHC RBC AUTO-ENTMCNC: 33.5 G/DL (ref 31.5–35.7)
MCV RBC AUTO: 92.7 FL (ref 79–97)
METHADONE UR QL SCN: NEGATIVE
MONOCYTES # BLD AUTO: 0.62 10*3/MM3 (ref 0.1–0.9)
MONOCYTES NFR BLD AUTO: 7.8 % (ref 5–12)
NEUTROPHILS NFR BLD AUTO: 5.35 10*3/MM3 (ref 1.7–7)
NEUTROPHILS NFR BLD AUTO: 67.5 % (ref 42.7–76)
NITRITE UR QL STRIP: NEGATIVE
NRBC BLD AUTO-RTO: 0 /100 WBC (ref 0–0.2)
OPIATES UR QL: NEGATIVE
OXYCODONE UR QL SCN: NEGATIVE
PH UR STRIP.AUTO: 8 [PH] (ref 5–8)
PLATELET # BLD AUTO: 264 10*3/MM3 (ref 140–450)
PMV BLD AUTO: 9.3 FL (ref 6–12)
POTASSIUM SERPL-SCNC: 4.3 MMOL/L (ref 3.5–5.2)
PROT SERPL-MCNC: 7.5 G/DL (ref 6–8.5)
PROT UR QL STRIP: NEGATIVE
RBC # BLD AUTO: 4.25 10*6/MM3 (ref 3.77–5.28)
RSV RNA NPH QL NAA+NON-PROBE: NOT DETECTED
SALICYLATES SERPL-MCNC: 0.4 MG/DL
SARS-COV-2 RNA RESP QL NAA+PROBE: NOT DETECTED
SODIUM SERPL-SCNC: 141 MMOL/L (ref 136–145)
SP GR UR STRIP: 1.01 (ref 1–1.03)
TSH SERPL DL<=0.05 MIU/L-ACNC: 2.04 UIU/ML (ref 0.27–4.2)
UROBILINOGEN UR QL STRIP: NORMAL
VIT B12 BLD-MCNC: 748 PG/ML (ref 211–946)
WBC NRBC COR # BLD AUTO: 7.92 10*3/MM3 (ref 3.4–10.8)

## 2024-08-13 PROCEDURE — 81003 URINALYSIS AUTO W/O SCOPE: CPT | Performed by: EMERGENCY MEDICINE

## 2024-08-13 PROCEDURE — 71045 X-RAY EXAM CHEST 1 VIEW: CPT

## 2024-08-13 PROCEDURE — 80307 DRUG TEST PRSMV CHEM ANLYZR: CPT | Performed by: EMERGENCY MEDICINE

## 2024-08-13 PROCEDURE — 87637 SARSCOV2&INF A&B&RSV AMP PRB: CPT | Performed by: EMERGENCY MEDICINE

## 2024-08-13 PROCEDURE — 85025 COMPLETE CBC W/AUTO DIFF WBC: CPT | Performed by: EMERGENCY MEDICINE

## 2024-08-13 PROCEDURE — 96374 THER/PROPH/DIAG INJ IV PUSH: CPT

## 2024-08-13 PROCEDURE — 82077 ASSAY SPEC XCP UR&BREATH IA: CPT | Performed by: EMERGENCY MEDICINE

## 2024-08-13 PROCEDURE — 80143 DRUG ASSAY ACETAMINOPHEN: CPT | Performed by: EMERGENCY MEDICINE

## 2024-08-13 PROCEDURE — 83735 ASSAY OF MAGNESIUM: CPT | Performed by: EMERGENCY MEDICINE

## 2024-08-13 PROCEDURE — 82607 VITAMIN B-12: CPT | Performed by: EMERGENCY MEDICINE

## 2024-08-13 PROCEDURE — 93005 ELECTROCARDIOGRAM TRACING: CPT | Performed by: EMERGENCY MEDICINE

## 2024-08-13 PROCEDURE — 80053 COMPREHEN METABOLIC PANEL: CPT | Performed by: EMERGENCY MEDICINE

## 2024-08-13 PROCEDURE — 99285 EMERGENCY DEPT VISIT HI MDM: CPT

## 2024-08-13 PROCEDURE — 25010000002 LORAZEPAM PER 2 MG: Performed by: EMERGENCY MEDICINE

## 2024-08-13 PROCEDURE — 84443 ASSAY THYROID STIM HORMONE: CPT | Performed by: EMERGENCY MEDICINE

## 2024-08-13 PROCEDURE — 80179 DRUG ASSAY SALICYLATE: CPT | Performed by: EMERGENCY MEDICINE

## 2024-08-13 RX ORDER — LORAZEPAM 2 MG/ML
1 INJECTION INTRAMUSCULAR ONCE
Status: COMPLETED | OUTPATIENT
Start: 2024-08-13 | End: 2024-08-13

## 2024-08-13 RX ORDER — SODIUM CHLORIDE 0.9 % (FLUSH) 0.9 %
10 SYRINGE (ML) INJECTION AS NEEDED
Status: DISCONTINUED | OUTPATIENT
Start: 2024-08-13 | End: 2024-08-13 | Stop reason: HOSPADM

## 2024-08-13 RX ORDER — LORAZEPAM 0.5 MG/1
1 TABLET ORAL NIGHTLY
COMMUNITY

## 2024-08-13 RX ADMIN — LORAZEPAM 1 MG: 2 INJECTION, SOLUTION INTRAMUSCULAR; INTRAVENOUS at 09:00

## 2024-08-16 ENCOUNTER — TELEPHONE (OUTPATIENT)
Dept: INTERNAL MEDICINE | Age: 74
End: 2024-08-16

## 2024-08-17 LAB
MYCOBACTERIUM SPEC CULT: NORMAL
NIGHT BLUE STAIN TISS: NORMAL
NIGHT BLUE STAIN TISS: NORMAL

## 2024-08-18 ENCOUNTER — HOSPITAL ENCOUNTER (EMERGENCY)
Facility: HOSPITAL | Age: 74
Discharge: OTHER INSTITUTION | End: 2024-08-18
Attending: EMERGENCY MEDICINE
Payer: MEDICARE

## 2024-08-18 ENCOUNTER — READMISSION MANAGEMENT (OUTPATIENT)
Dept: CALL CENTER | Facility: HOSPITAL | Age: 74
End: 2024-08-18
Payer: MEDICARE

## 2024-08-18 ENCOUNTER — APPOINTMENT (OUTPATIENT)
Dept: GENERAL RADIOLOGY | Facility: HOSPITAL | Age: 74
End: 2024-08-18
Payer: MEDICARE

## 2024-08-18 VITALS
HEIGHT: 61 IN | HEART RATE: 78 BPM | OXYGEN SATURATION: 96 % | SYSTOLIC BLOOD PRESSURE: 129 MMHG | BODY MASS INDEX: 16.4 KG/M2 | TEMPERATURE: 98.2 F | DIASTOLIC BLOOD PRESSURE: 65 MMHG | RESPIRATION RATE: 17 BRPM | WEIGHT: 86.86 LBS

## 2024-08-18 DIAGNOSIS — F41.9 ANXIETY: Primary | ICD-10-CM

## 2024-08-18 DIAGNOSIS — G47.00 INSOMNIA, UNSPECIFIED TYPE: ICD-10-CM

## 2024-08-18 LAB
ALBUMIN SERPL-MCNC: 3.5 G/DL (ref 3.5–5.2)
ALBUMIN/GLOB SERPL: 0.9 G/DL
ALP SERPL-CCNC: 81 U/L (ref 39–117)
ALT SERPL W P-5'-P-CCNC: 15 U/L (ref 1–33)
AMPHET+METHAMPHET UR QL: NEGATIVE
ANION GAP SERPL CALCULATED.3IONS-SCNC: 10.6 MMOL/L (ref 5–15)
APAP SERPL-MCNC: <5 MCG/ML (ref 0–30)
AST SERPL-CCNC: 26 U/L (ref 1–32)
BARBITURATES UR QL SCN: NEGATIVE
BASOPHILS # BLD AUTO: 0.05 10*3/MM3 (ref 0–0.2)
BASOPHILS NFR BLD AUTO: 0.7 % (ref 0–1.5)
BENZODIAZ UR QL SCN: POSITIVE
BILIRUB SERPL-MCNC: 0.2 MG/DL (ref 0–1.2)
BILIRUB UR QL STRIP: NEGATIVE
BUN SERPL-MCNC: 14 MG/DL (ref 8–23)
BUN/CREAT SERPL: 16.9 (ref 7–25)
CALCIUM SPEC-SCNC: 8.9 MG/DL (ref 8.6–10.5)
CANNABINOIDS SERPL QL: NEGATIVE
CHLORIDE SERPL-SCNC: 99 MMOL/L (ref 98–107)
CLARITY UR: CLEAR
CO2 SERPL-SCNC: 24.4 MMOL/L (ref 22–29)
COCAINE UR QL: NEGATIVE
COLOR UR: YELLOW
CREAT SERPL-MCNC: 0.83 MG/DL (ref 0.57–1)
DEPRECATED RDW RBC AUTO: 47.6 FL (ref 37–54)
EGFRCR SERPLBLD CKD-EPI 2021: 73.6 ML/MIN/1.73
EOSINOPHIL # BLD AUTO: 0.02 10*3/MM3 (ref 0–0.4)
EOSINOPHIL NFR BLD AUTO: 0.3 % (ref 0.3–6.2)
ERYTHROCYTE [DISTWIDTH] IN BLOOD BY AUTOMATED COUNT: 14.1 % (ref 12.3–15.4)
ETHANOL BLD-MCNC: <10 MG/DL (ref 0–10)
ETHANOL UR QL: <0.01 %
FENTANYL UR-MCNC: NEGATIVE NG/ML
FLUAV SUBTYP SPEC NAA+PROBE: NOT DETECTED
FLUBV RNA ISLT QL NAA+PROBE: NOT DETECTED
GLOBULIN UR ELPH-MCNC: 3.7 GM/DL
GLUCOSE BLDC GLUCOMTR-MCNC: 101 MG/DL (ref 70–99)
GLUCOSE SERPL-MCNC: 231 MG/DL (ref 65–99)
GLUCOSE UR STRIP-MCNC: NEGATIVE MG/DL
HCG INTACT+B SERPL-ACNC: 2.71 MIU/ML
HCT VFR BLD AUTO: 36.6 % (ref 34–46.6)
HGB BLD-MCNC: 12.3 G/DL (ref 12–15.9)
HGB UR QL STRIP.AUTO: NEGATIVE
HOLD SPECIMEN: NORMAL
HOLD SPECIMEN: NORMAL
IMM GRANULOCYTES # BLD AUTO: 0.02 10*3/MM3 (ref 0–0.05)
IMM GRANULOCYTES NFR BLD AUTO: 0.3 % (ref 0–0.5)
KETONES UR QL STRIP: NEGATIVE
LEUKOCYTE ESTERASE UR QL STRIP.AUTO: NEGATIVE
LYMPHOCYTES # BLD AUTO: 1.68 10*3/MM3 (ref 0.7–3.1)
LYMPHOCYTES NFR BLD AUTO: 24.3 % (ref 19.6–45.3)
MCH RBC QN AUTO: 30.9 PG (ref 26.6–33)
MCHC RBC AUTO-ENTMCNC: 33.6 G/DL (ref 31.5–35.7)
MCV RBC AUTO: 92 FL (ref 79–97)
METHADONE UR QL SCN: NEGATIVE
MONOCYTES # BLD AUTO: 0.35 10*3/MM3 (ref 0.1–0.9)
MONOCYTES NFR BLD AUTO: 5.1 % (ref 5–12)
NEUTROPHILS NFR BLD AUTO: 4.8 10*3/MM3 (ref 1.7–7)
NEUTROPHILS NFR BLD AUTO: 69.3 % (ref 42.7–76)
NITRITE UR QL STRIP: NEGATIVE
NRBC BLD AUTO-RTO: 0 /100 WBC (ref 0–0.2)
OPIATES UR QL: NEGATIVE
OXYCODONE UR QL SCN: NEGATIVE
PH UR STRIP.AUTO: 7 [PH] (ref 5–8)
PLATELET # BLD AUTO: 255 10*3/MM3 (ref 140–450)
PMV BLD AUTO: 9.7 FL (ref 6–12)
POTASSIUM SERPL-SCNC: 4 MMOL/L (ref 3.5–5.2)
PROT SERPL-MCNC: 7.2 G/DL (ref 6–8.5)
PROT UR QL STRIP: NEGATIVE
QT INTERVAL: 392 MS
QTC INTERVAL: 437 MS
RBC # BLD AUTO: 3.98 10*6/MM3 (ref 3.77–5.28)
RSV RNA NPH QL NAA+NON-PROBE: NOT DETECTED
SALICYLATES SERPL-MCNC: <0.3 MG/DL
SARS-COV-2 RNA RESP QL NAA+PROBE: NOT DETECTED
SODIUM SERPL-SCNC: 134 MMOL/L (ref 136–145)
SP GR UR STRIP: 1.01 (ref 1–1.03)
T4 FREE SERPL-MCNC: 1.19 NG/DL (ref 0.92–1.68)
TSH SERPL DL<=0.05 MIU/L-ACNC: 1.94 UIU/ML (ref 0.27–4.2)
UROBILINOGEN UR QL STRIP: NORMAL
WBC NRBC COR # BLD AUTO: 6.92 10*3/MM3 (ref 3.4–10.8)
WHOLE BLOOD HOLD COAG: NORMAL
WHOLE BLOOD HOLD SPECIMEN: NORMAL

## 2024-08-18 PROCEDURE — 82077 ASSAY SPEC XCP UR&BREATH IA: CPT | Performed by: EMERGENCY MEDICINE

## 2024-08-18 PROCEDURE — 80307 DRUG TEST PRSMV CHEM ANLYZR: CPT | Performed by: EMERGENCY MEDICINE

## 2024-08-18 PROCEDURE — 93005 ELECTROCARDIOGRAM TRACING: CPT

## 2024-08-18 PROCEDURE — 87637 SARSCOV2&INF A&B&RSV AMP PRB: CPT | Performed by: EMERGENCY MEDICINE

## 2024-08-18 PROCEDURE — 80053 COMPREHEN METABOLIC PANEL: CPT | Performed by: EMERGENCY MEDICINE

## 2024-08-18 PROCEDURE — 80143 DRUG ASSAY ACETAMINOPHEN: CPT | Performed by: EMERGENCY MEDICINE

## 2024-08-18 PROCEDURE — 99285 EMERGENCY DEPT VISIT HI MDM: CPT

## 2024-08-18 PROCEDURE — 93010 ELECTROCARDIOGRAM REPORT: CPT | Performed by: INTERNAL MEDICINE

## 2024-08-18 PROCEDURE — 81003 URINALYSIS AUTO W/O SCOPE: CPT | Performed by: EMERGENCY MEDICINE

## 2024-08-18 PROCEDURE — 80179 DRUG ASSAY SALICYLATE: CPT | Performed by: EMERGENCY MEDICINE

## 2024-08-18 PROCEDURE — 96374 THER/PROPH/DIAG INJ IV PUSH: CPT

## 2024-08-18 PROCEDURE — 25010000002 LORAZEPAM PER 2 MG: Performed by: EMERGENCY MEDICINE

## 2024-08-18 PROCEDURE — 84443 ASSAY THYROID STIM HORMONE: CPT | Performed by: EMERGENCY MEDICINE

## 2024-08-18 PROCEDURE — 85025 COMPLETE CBC W/AUTO DIFF WBC: CPT | Performed by: EMERGENCY MEDICINE

## 2024-08-18 PROCEDURE — 84439 ASSAY OF FREE THYROXINE: CPT | Performed by: EMERGENCY MEDICINE

## 2024-08-18 PROCEDURE — 84702 CHORIONIC GONADOTROPIN TEST: CPT | Performed by: EMERGENCY MEDICINE

## 2024-08-18 PROCEDURE — 93005 ELECTROCARDIOGRAM TRACING: CPT | Performed by: EMERGENCY MEDICINE

## 2024-08-18 PROCEDURE — 82948 REAGENT STRIP/BLOOD GLUCOSE: CPT

## 2024-08-18 PROCEDURE — 71045 X-RAY EXAM CHEST 1 VIEW: CPT

## 2024-08-18 RX ORDER — LORAZEPAM 2 MG/ML
1 INJECTION INTRAMUSCULAR ONCE
Status: COMPLETED | OUTPATIENT
Start: 2024-08-18 | End: 2024-08-18

## 2024-08-18 RX ADMIN — LORAZEPAM 1 MG: 2 INJECTION INTRAMUSCULAR; INTRAVENOUS at 11:16

## 2024-08-20 LAB
QT INTERVAL: 368 MS
QTC INTERVAL: 435 MS

## 2024-08-24 LAB
MYCOBACTERIUM SPEC CULT: NORMAL
NIGHT BLUE STAIN TISS: NORMAL
NIGHT BLUE STAIN TISS: NORMAL

## 2024-08-31 LAB
MYCOBACTERIUM SPEC CULT: NORMAL
NIGHT BLUE STAIN TISS: NORMAL
NIGHT BLUE STAIN TISS: NORMAL

## 2024-09-03 ENCOUNTER — TELEPHONE (OUTPATIENT)
Dept: PSYCHIATRY | Facility: CLINIC | Age: 74
End: 2024-09-03
Payer: MEDICARE

## 2024-09-07 LAB
MYCOBACTERIUM SPEC CULT: NORMAL
NIGHT BLUE STAIN TISS: NORMAL
NIGHT BLUE STAIN TISS: NORMAL

## 2024-09-13 LAB
MYCOBACTERIUM SPEC CULT: ABNORMAL
NIGHT BLUE STAIN TISS: ABNORMAL

## 2024-09-14 LAB
MYCOBACTERIUM SPEC CULT: NORMAL
NIGHT BLUE STAIN TISS: NORMAL
NIGHT BLUE STAIN TISS: NORMAL

## 2024-09-18 DIAGNOSIS — F51.05 INSOMNIA DUE TO MENTAL CONDITION: Primary | ICD-10-CM

## 2024-09-18 DIAGNOSIS — F31.81 MIXED BIPOLAR II DISORDER: ICD-10-CM

## 2024-09-18 DIAGNOSIS — F41.1 GENERALIZED ANXIETY DISORDER: ICD-10-CM

## 2024-09-18 RX ORDER — ONDANSETRON 4 MG/1
4 TABLET, ORALLY DISINTEGRATING ORAL
COMMUNITY

## 2024-09-18 RX ORDER — MIRTAZAPINE 15 MG/1
15 TABLET, FILM COATED ORAL
COMMUNITY
Start: 2024-08-30 | End: 2024-09-18 | Stop reason: SDUPTHER

## 2024-09-18 RX ORDER — OLANZAPINE 5 MG/1
5 TABLET ORAL NIGHTLY
Qty: 30 TABLET | Refills: 2 | Status: SHIPPED | OUTPATIENT
Start: 2024-09-18

## 2024-09-18 RX ORDER — TRAZODONE HYDROCHLORIDE 100 MG/1
100 TABLET ORAL NIGHTLY
Qty: 30 TABLET | Refills: 2 | Status: SHIPPED | OUTPATIENT
Start: 2024-09-18

## 2024-09-18 RX ORDER — MIRTAZAPINE 15 MG/1
15 TABLET, FILM COATED ORAL
Qty: 30 TABLET | Refills: 2 | Status: SHIPPED | OUTPATIENT
Start: 2024-09-18

## 2024-09-18 RX ORDER — QUETIAPINE FUMARATE 25 MG/1
TABLET, FILM COATED ORAL
COMMUNITY
Start: 2024-08-30 | End: 2024-09-18

## 2024-09-21 LAB
MYCOBACTERIUM SPEC CULT: NORMAL
NIGHT BLUE STAIN TISS: NORMAL
NIGHT BLUE STAIN TISS: NORMAL

## 2024-09-25 ENCOUNTER — HOSPITAL ENCOUNTER (OUTPATIENT)
Dept: CT IMAGING | Facility: HOSPITAL | Age: 74
Discharge: HOME | End: 2024-09-25
Admitting: NURSE PRACTITIONER
Payer: MEDICARE

## 2024-09-25 DIAGNOSIS — R05.9 COUGH, UNSPECIFIED TYPE: ICD-10-CM

## 2024-09-25 DIAGNOSIS — Z51.81 MEDICATION MONITORING ENCOUNTER: ICD-10-CM

## 2024-09-25 DIAGNOSIS — A31.0 MYCOBACTERIUM AVIUM COMPLEX: ICD-10-CM

## 2024-09-25 PROCEDURE — 71250 CT THORAX DX C-: CPT

## 2024-09-26 ENCOUNTER — OFFICE VISIT (OUTPATIENT)
Dept: INTERNAL MEDICINE | Age: 74
End: 2024-09-26
Payer: MEDICARE

## 2024-09-26 ENCOUNTER — OFFICE VISIT (OUTPATIENT)
Dept: PULMONOLOGY | Facility: CLINIC | Age: 74
End: 2024-09-26
Payer: MEDICARE

## 2024-09-26 VITALS
WEIGHT: 84 LBS | TEMPERATURE: 98 F | BODY MASS INDEX: 15.86 KG/M2 | HEART RATE: 69 BPM | DIASTOLIC BLOOD PRESSURE: 75 MMHG | OXYGEN SATURATION: 96 % | RESPIRATION RATE: 18 BRPM | SYSTOLIC BLOOD PRESSURE: 142 MMHG | HEIGHT: 61 IN

## 2024-09-26 VITALS
HEIGHT: 61 IN | BODY MASS INDEX: 15.9 KG/M2 | SYSTOLIC BLOOD PRESSURE: 140 MMHG | DIASTOLIC BLOOD PRESSURE: 70 MMHG | WEIGHT: 84.2 LBS | OXYGEN SATURATION: 95 % | HEART RATE: 73 BPM | TEMPERATURE: 98.4 F

## 2024-09-26 DIAGNOSIS — R63.4 WEIGHT LOSS: ICD-10-CM

## 2024-09-26 DIAGNOSIS — R00.2 PALPITATIONS: Primary | ICD-10-CM

## 2024-09-26 DIAGNOSIS — A31.0 MYCOBACTERIUM AVIUM COMPLEX: Primary | ICD-10-CM

## 2024-09-26 DIAGNOSIS — A31.0 MYCOBACTERIUM AVIUM COMPLEX: ICD-10-CM

## 2024-09-26 DIAGNOSIS — Z23 NEED FOR VACCINATION: ICD-10-CM

## 2024-09-26 DIAGNOSIS — R93.89 ABNORMAL CT SCAN, CHEST: ICD-10-CM

## 2024-09-26 DIAGNOSIS — R13.12 OROPHARYNGEAL DYSPHAGIA: ICD-10-CM

## 2024-09-26 DIAGNOSIS — F17.201 TOBACCO ABUSE, IN REMISSION: ICD-10-CM

## 2024-09-26 DIAGNOSIS — F33.1 MODERATE EPISODE OF RECURRENT MAJOR DEPRESSIVE DISORDER: ICD-10-CM

## 2024-09-26 PROBLEM — R05.9 COUGH: Status: RESOLVED | Noted: 2024-08-07 | Resolved: 2024-09-26

## 2024-09-26 PROBLEM — R11.0 NAUSEA: Status: RESOLVED | Noted: 2024-08-07 | Resolved: 2024-09-26

## 2024-09-26 PROCEDURE — 3077F SYST BP >= 140 MM HG: CPT | Performed by: INTERNAL MEDICINE

## 2024-09-26 PROCEDURE — G0008 ADMIN INFLUENZA VIRUS VAC: HCPCS | Performed by: INTERNAL MEDICINE

## 2024-09-26 PROCEDURE — 99214 OFFICE O/P EST MOD 30 MIN: CPT | Performed by: INTERNAL MEDICINE

## 2024-09-26 PROCEDURE — 3078F DIAST BP <80 MM HG: CPT | Performed by: INTERNAL MEDICINE

## 2024-09-26 PROCEDURE — 1126F AMNT PAIN NOTED NONE PRSNT: CPT | Performed by: INTERNAL MEDICINE

## 2024-09-26 PROCEDURE — 90662 IIV NO PRSV INCREASED AG IM: CPT | Performed by: INTERNAL MEDICINE

## 2024-09-26 RX ORDER — QUETIAPINE FUMARATE 25 MG/1
50 TABLET, FILM COATED ORAL
COMMUNITY
Start: 2024-08-29

## 2024-10-01 ENCOUNTER — TELEPHONE (OUTPATIENT)
Dept: PULMONOLOGY | Facility: CLINIC | Age: 74
End: 2024-10-01
Payer: MEDICARE

## 2024-10-01 RX ORDER — RIFAMPIN 300 MG/1
300 CAPSULE ORAL 2 TIMES DAILY
Qty: 180 CAPSULE | Refills: 3 | Status: SHIPPED | OUTPATIENT
Start: 2024-10-01

## 2024-10-03 ENCOUNTER — OFFICE VISIT (OUTPATIENT)
Dept: PSYCHIATRY | Facility: CLINIC | Age: 74
End: 2024-10-03
Payer: MEDICARE

## 2024-10-03 VITALS
DIASTOLIC BLOOD PRESSURE: 73 MMHG | BODY MASS INDEX: 16.35 KG/M2 | SYSTOLIC BLOOD PRESSURE: 158 MMHG | HEIGHT: 61 IN | HEART RATE: 76 BPM | WEIGHT: 86.6 LBS

## 2024-10-03 DIAGNOSIS — F51.05 INSOMNIA DUE TO MENTAL CONDITION: Primary | ICD-10-CM

## 2024-10-03 DIAGNOSIS — F41.1 GENERALIZED ANXIETY DISORDER: ICD-10-CM

## 2024-10-03 DIAGNOSIS — F31.81 MIXED BIPOLAR II DISORDER: ICD-10-CM

## 2024-10-03 DIAGNOSIS — F41.0 PANIC ATTACKS: ICD-10-CM

## 2024-10-03 RX ORDER — TRAZODONE HYDROCHLORIDE 150 MG/1
150 TABLET ORAL NIGHTLY
Qty: 90 TABLET | Refills: 1 | Status: SHIPPED | OUTPATIENT
Start: 2024-10-03

## 2024-10-03 RX ORDER — OLANZAPINE 10 MG/1
10 TABLET ORAL NIGHTLY
Qty: 90 TABLET | Refills: 1 | Status: SHIPPED | OUTPATIENT
Start: 2024-10-03

## 2024-11-20 ENCOUNTER — LAB (OUTPATIENT)
Facility: HOSPITAL | Age: 74
End: 2024-11-20
Payer: MEDICARE

## 2024-11-20 DIAGNOSIS — R63.4 WEIGHT LOSS: ICD-10-CM

## 2024-11-20 DIAGNOSIS — A31.0 MYCOBACTERIUM AVIUM COMPLEX: ICD-10-CM

## 2024-11-20 DIAGNOSIS — R00.2 PALPITATIONS: ICD-10-CM

## 2024-11-20 LAB
ALBUMIN SERPL-MCNC: 3.8 G/DL (ref 3.5–5.2)
ALBUMIN/GLOB SERPL: 1 G/DL
ALP SERPL-CCNC: 112 U/L (ref 39–117)
ALT SERPL W P-5'-P-CCNC: 15 U/L (ref 1–33)
ANION GAP SERPL CALCULATED.3IONS-SCNC: 10.9 MMOL/L (ref 5–15)
AST SERPL-CCNC: 25 U/L (ref 1–32)
BASOPHILS # BLD AUTO: 0.05 10*3/MM3 (ref 0–0.2)
BASOPHILS NFR BLD AUTO: 0.8 % (ref 0–1.5)
BILIRUB SERPL-MCNC: 0.2 MG/DL (ref 0–1.2)
BUN SERPL-MCNC: 10 MG/DL (ref 8–23)
BUN/CREAT SERPL: 13.2 (ref 7–25)
CALCIUM SPEC-SCNC: 9.5 MG/DL (ref 8.6–10.5)
CHLORIDE SERPL-SCNC: 102 MMOL/L (ref 98–107)
CO2 SERPL-SCNC: 29.1 MMOL/L (ref 22–29)
CREAT SERPL-MCNC: 0.76 MG/DL (ref 0.57–1)
DEPRECATED RDW RBC AUTO: 45.9 FL (ref 37–54)
EGFRCR SERPLBLD CKD-EPI 2021: 81.8 ML/MIN/1.73
EOSINOPHIL # BLD AUTO: 0.06 10*3/MM3 (ref 0–0.4)
EOSINOPHIL NFR BLD AUTO: 0.9 % (ref 0.3–6.2)
ERYTHROCYTE [DISTWIDTH] IN BLOOD BY AUTOMATED COUNT: 13.2 % (ref 12.3–15.4)
FOLATE SERPL-MCNC: >20 NG/ML (ref 4.78–24.2)
GLOBULIN UR ELPH-MCNC: 3.7 GM/DL
GLUCOSE SERPL-MCNC: 84 MG/DL (ref 65–99)
HCT VFR BLD AUTO: 39.3 % (ref 34–46.6)
HGB BLD-MCNC: 13.4 G/DL (ref 12–15.9)
IMM GRANULOCYTES # BLD AUTO: 0.01 10*3/MM3 (ref 0–0.05)
IMM GRANULOCYTES NFR BLD AUTO: 0.2 % (ref 0–0.5)
LYMPHOCYTES # BLD AUTO: 2.74 10*3/MM3 (ref 0.7–3.1)
LYMPHOCYTES NFR BLD AUTO: 41.9 % (ref 19.6–45.3)
MCH RBC QN AUTO: 32.5 PG (ref 26.6–33)
MCHC RBC AUTO-ENTMCNC: 34.1 G/DL (ref 31.5–35.7)
MCV RBC AUTO: 95.4 FL (ref 79–97)
MONOCYTES # BLD AUTO: 0.74 10*3/MM3 (ref 0.1–0.9)
MONOCYTES NFR BLD AUTO: 11.3 % (ref 5–12)
NEUTROPHILS NFR BLD AUTO: 2.94 10*3/MM3 (ref 1.7–7)
NEUTROPHILS NFR BLD AUTO: 44.9 % (ref 42.7–76)
NRBC BLD AUTO-RTO: 0 /100 WBC (ref 0–0.2)
PLATELET # BLD AUTO: 269 10*3/MM3 (ref 140–450)
PMV BLD AUTO: 10.4 FL (ref 6–12)
POTASSIUM SERPL-SCNC: 4 MMOL/L (ref 3.5–5.2)
PROT SERPL-MCNC: 7.5 G/DL (ref 6–8.5)
RBC # BLD AUTO: 4.12 10*6/MM3 (ref 3.77–5.28)
SODIUM SERPL-SCNC: 142 MMOL/L (ref 136–145)
T4 FREE SERPL-MCNC: 0.9 NG/DL (ref 0.92–1.68)
TSH SERPL DL<=0.05 MIU/L-ACNC: 1.48 UIU/ML (ref 0.27–4.2)
VIT B12 BLD-MCNC: 692 PG/ML (ref 211–946)
WBC NRBC COR # BLD AUTO: 6.54 10*3/MM3 (ref 3.4–10.8)

## 2024-11-20 PROCEDURE — 84439 ASSAY OF FREE THYROXINE: CPT

## 2024-11-20 PROCEDURE — 80053 COMPREHEN METABOLIC PANEL: CPT

## 2024-11-20 PROCEDURE — 36415 COLL VENOUS BLD VENIPUNCTURE: CPT

## 2024-11-20 PROCEDURE — 82607 VITAMIN B-12: CPT

## 2024-11-20 PROCEDURE — 82746 ASSAY OF FOLIC ACID SERUM: CPT

## 2024-11-20 PROCEDURE — 84443 ASSAY THYROID STIM HORMONE: CPT

## 2024-11-20 PROCEDURE — 85025 COMPLETE CBC W/AUTO DIFF WBC: CPT

## 2024-11-26 ENCOUNTER — OFFICE VISIT (OUTPATIENT)
Dept: PSYCHIATRY | Facility: CLINIC | Age: 74
End: 2024-11-26
Payer: MEDICARE

## 2024-11-26 ENCOUNTER — TELEPHONE (OUTPATIENT)
Dept: PSYCHIATRY | Facility: CLINIC | Age: 74
End: 2024-11-26

## 2024-11-26 VITALS
OXYGEN SATURATION: 96 % | SYSTOLIC BLOOD PRESSURE: 147 MMHG | WEIGHT: 87 LBS | DIASTOLIC BLOOD PRESSURE: 78 MMHG | HEIGHT: 61 IN | BODY MASS INDEX: 16.42 KG/M2 | HEART RATE: 81 BPM

## 2024-11-26 DIAGNOSIS — F31.81 MIXED BIPOLAR II DISORDER: ICD-10-CM

## 2024-11-26 DIAGNOSIS — F41.1 GENERALIZED ANXIETY DISORDER: ICD-10-CM

## 2024-11-26 DIAGNOSIS — F41.0 PANIC ATTACKS: ICD-10-CM

## 2024-11-26 DIAGNOSIS — F51.05 INSOMNIA DUE TO MENTAL CONDITION: Primary | ICD-10-CM

## 2024-11-26 RX ORDER — TRAZODONE HYDROCHLORIDE 100 MG/1
200 TABLET ORAL NIGHTLY
Qty: 30 TABLET | Refills: 5 | Status: SHIPPED | OUTPATIENT
Start: 2024-11-26

## 2024-11-26 RX ORDER — OLANZAPINE 15 MG/1
15 TABLET ORAL NIGHTLY
Qty: 30 TABLET | Refills: 2 | Status: SHIPPED | OUTPATIENT
Start: 2024-11-26

## 2024-11-26 NOTE — TELEPHONE ENCOUNTER
REQ REFILL FOR ATIVAN, I SHOW THAT DR. ARENAS ORDERS THIS BUT PT STATES NOW YOU ARE THE ORDERING PROVIDER. I INFORMED PT I WOULD SEND A MESSAGE REGARDING THIS.

## 2024-11-26 NOTE — PROGRESS NOTES
"Subjective   Manju Mendoza is a 74 y.o. female who presents today for follow up    Chief Complaint: Bipolar 2    History of Present Illness:    Manju Mendoza is a 70 year old /White female who presents to the office today referred by Dustin Pennington DO.     Chart review 1/19: Seen December 22. History of chronic anxiety and insomnia. Sleeping better on Ambien 12.5 at night extended release. Also on Klonopin 1 mg at night. Mirtazapine 15 mg at night. Olanzapine 5 mg at night. Trazodone 50 mg at night. June labs: Lipids are elevated, LFTs normal, creatinine 0.79, hematocrit 43, electrolytes normal, TSH 1.44, free T4 1.1, folate is normal, B12 is normal. No head imaging or EKG.     Chart review:   11/26/2024: reassuring B12, folate, TSH, low fT4, abnl cmp c02 29.1; reassuring cbc. Missed her 10/23/24 appnt.  09/17/2024: not seen in over a year.  Patient never followed up in 10 days like she was supposed to.  Seen by int med.   Cologuard neg  Swallow study done, functional deficit 6/7.    Planning:   10/3/2024: Not seen in over a year. Rifampin for presumable CLAY decreases the concentration of both trazodone and olanzapine. Sleeping worse the last 3 mos; was doing better -- but not perfect -- before then. Mixed bipolar, insomnia. Increase olanzapine and trazodone temporarily. Close follow up.  7/31/23: Patient continues to make changes to meds on her own (reduced klonopin to 0.5 mg nightly), but she is finally sleeping. No changes for now.  Start gabapentin with traz and klonopin for insomnia.       Visits (Below):  \"Manju.\" Her parents called her Shae and she didn't like that.  Refuses to do a sleep study.    11/26/2024:   Interview:  \"I'm still not sleeping.\"  Getting sleep, but not enough.   Initial insomnia 3-4 hours some nights, also maintenance insomnia  Still repeating the same pattern: doesn't sleep well for 2 nights, then sleeps well the third night out of " "exhaustion  Mood/Depression: mild depressed mood  Anxiety: excessive worrying  Panic attacks: stable  Energy: down  Concentration: baseline low  Insomnia: initial and maintenance  Eatin, 86 lbs  Refills: y  Substances: def  Therapy: n  Medication compliant: y  SE: none  No SI HI AVH.      10/3/2024:   Interview:  \"I'm not doing good.\"   I'm sleeping, but only on and off  I spent 3 days at [place]  I never slept well  I was at [facility] for 9 days  I never slept well  Compliant on all medications, taking every night  Sometimes sleeps, sometimes doesn't  Sleeps every third night  I was diagnosed with a very severe lung infection, TB-like  Mood/Depression:   Anxiety: minimal  Panic attacks: n  Energy: improved  Concentration: baseline low  Sleeping:   Eatin lbs  Refills: y  Substances: def  Therapy: n  Medication compliant: y  SE: some grogginess in the mornings/hungover feeling  No SI HI AVH.      23:   In person interview:  \"Most nights I sleep some.\"  Improved.   Mood/Depression: good mood, improved  Anxiety: minimal  Panic attacks: n  Energy: improved  Concentration: baseline low  Sleeping:  Getting enough to function.   Most nights I get to sleep  Some nights I have trouble going to sleep, but \"not real often'  Eatin lb wl   Refills: y  Substances: def  Therapy: n  Medication compliant: y  SE: some grogginess in the mornings/hungover feeling  No SI HI AVH.        PHQ-9 Depression Screening  Little interest or pleasure in doing things?     Feeling down, depressed, or hopeless?     Trouble falling or staying asleep, or sleeping too much?     Feeling tired or having little energy?     Poor appetite or overeating?     Feeling bad about yourself - or that you are a failure or have let yourself or your family down?     Trouble concentrating on things, such as reading the newspaper or watching television?     Moving or speaking so slowly that other people could have noticed? Or the opposite - being " "so fidgety or restless that you have been moving around a lot more than usual?     Thoughts that you would be better off dead, or of hurting yourself in some way?     PHQ-9 Total Score     If you checked off any problems, how difficult have these problems made it for you to do your work, take care of things at home, or get along with other people?           1/19 H&P:   Virtual visit via Zoom audio due to the COVID-19 pandemic for 46 minutes. Patient could not start the video. Patient is accepting of and agreeable to appointment. The appointment consisted of the patient and I only. Interview: Patient reports a lack of sleep for several years. She reports it has intensified over the last few months, she is unclear why. Also endorses some anxiety and depressive symptoms. What I noted today was that she was very difficult to interrupt, and was tangential, in addition to her insomnia. She denies ever being diagnosed with bipolar disorder, but she was trialed on Depakote and Seroquel in the past. She reports that Seroquel did help her sleep. She does not remember when she was placed on it. She also feels that her mirtazapine is making it harder for her to sleep, and is \"making her angrier,\" which does not surprise me.   Endorses muscle tension, fatigue, poor concentration, restlessness, irritability, insomnia. Now that she is taking Ambien, Klonopin, she is sleeping about 6 hours a night. Also endorses feelings of worthlessness. Also states \"I cry a lot.\"   Denies SI HI AVH. Has access to weapons that are locked away. Psychiatric review of systems is positive for anxiety and depression, possible domingo, negative for psychosis.   ...   Past Psychiatric History:  Began Psychiatric Treatment: When she was 25 years old   Dx: Anxiety, depression, insomnia   Psychiatrist: Dr. Pennington has been taking care of her for 1 year   Therapist: Julian   : Julian   Admissions History: Has been admitted 3 times, the last time was " 20 years ago.   Medication Trials: Multiple. Prozac, trazodone, Zoloft, Paxil, Cymbalta which worsened her insomnia, Seroquel helped her sleep. Depakote gave her hallucinations. She has never tried olanzapine or Abilify. She avoids antipsychotics because they cause weight gain.   Self-Harm: Denies   Suicide Attempts: Denies   Substance Abuse History:  Types: Denies all, including illicit   Withdrawl Symptoms: Not applicable   Longest period sober: Not applicable   AA: N/A   Admissions History: Denies   Residential History: Denies   Legal: N/A   Social History:  Marital Status:    Employed: No     Kids: 2 children   House: Lives in a house    Hx: No  history   Family History:  Suicide Attempts: Maternal grandfather committed suicide   Suicide Completions: Maternal grandfather committed suicide   Substance Use: Patient's daughter polysubstance use   Psychiatric Conditions: Her mom and brother both been on psychiatric medications    depression, psychosis, anxiety: Patient became extremely paranoid after having her daughter, she did not have any treatment. After she had her son, she began to have insomnia   Developmental History:  Born: Deferred   Siblings: Deferred   Childhood: Deferred   High School: Deferred   College: Deferred     Past Surgical History:  Past Surgical History:   Procedure Laterality Date    APPENDECTOMY      BLADDER REPAIR      CARPAL TUNNEL RELEASE      CHOLECYSTECTOMY      COLONOSCOPY      ENDOSCOPY      2009    ENDOSCOPY N/A 2024    Procedure: ESOPHAGOGASTRODUODENOSCOPY WITH BIOPSIES, BALLOON DILATATION 15-18;  Surgeon: Shawna Fournier MD;  Location: [facility] ENDOSCOPY;  Service: Gastroenterology;  Laterality: N/A;  ESPOHAGITIS AND GASTRITIS, ESOPHAGEAL STRICTURE    GALLBLADDER SURGERY      HYSTERECTOMY      OTHER SURGICAL HISTORY      METAL IMPLANTS    OTHER SURGICAL HISTORY      SURGICAL CLIPS    SHOULDER SURGERY       TONSILLECTOMY      UPPER GASTROINTESTINAL ENDOSCOPY         Problem List:  Patient Active Problem List   Diagnosis    Cervical radiculopathy    Panlobular emphysema    Lumbar spondylosis    Mixed hyperlipidemia    Primary insomnia    Memory change    Moderate episode of recurrent major depressive disorder    Primary hypertension    Lymphocytosis    Well adult exam    Claudication    Medication monitoring encounter    Oropharyngeal dysphagia    Xerostomia    Weight loss    Chronic idiopathic constipation    Palpitations    Abnormal CT scan, chest    Mycobacterium avium complex    Tobacco abuse, in remission       Allergy:   Allergies   Allergen Reactions    Codeine Palpitations    Nsaids GI Intolerance        Discontinued Medications:  Medications Discontinued During This Encounter   Medication Reason    QUEtiapine (SEROquel) 25 MG tablet *Therapy completed    OLANZapine (ZyPREXA) 10 MG tablet Reorder    traZODone (DESYREL) 150 MG tablet Reorder             Current Medications:   Current Outpatient Medications   Medication Sig Dispense Refill    acetaminophen (TYLENOL) 500 MG tablet Take 1 tablet by mouth Every 6 (Six) Hours As Needed.      azithromycin (ZITHROMAX) 250 MG tablet Take 1 tablet by mouth 3 (Three) Times a Week. (Patient taking differently: Take 1 tablet by mouth 3 (Three) Times a Week. SUN, WED, FRI) 30 tablet 11    ethambutol (MYAMBUTOL) 400 MG tablet Take 1.5 tablets by mouth Daily. 30 tablet 9    famotidine (PEPCID) 20 MG tablet Take 1 tablet by mouth 2 (Two) Times a Day. 90 tablet 3    Ferrous Sulfate (IRON PO) Take 1 tablet by mouth Daily.      lidocaine (LIDODERM) 5 % Place 1 patch on the skin as directed by provider Daily. Remove & Discard patch within 12 hours or as directed by MD 90 each 3    LORazepam (Ativan) 0.5 MG tablet Take 1/2 to 1 tablet up to 2 times a day and 1 to 2 tablets at bedtime. (Patient taking differently: Take 0.5-1 tablets by mouth 2 (Two) Times a Day. Take 1/2 to 1 tablet  up to 2 times a day and 1 to 2 tablets at bedtime.) 270 tablet 1    LORazepam (ATIVAN) 0.5 MG tablet Take 2 tablets by mouth Every Night.      megestrol (MEGACE) 40 MG/ML suspension Take 10 mL every morning, may increase to 20 mL if not gaining weight. (Patient taking differently: Take 15 mL by mouth Daily. Take 10 mL every morning, may increase to 20 mL if not gaining weight.) 1800 mL 1    mirtazapine (REMERON) 15 MG tablet Take 1 tablet by mouth every night at bedtime. 30 tablet 2    multivitamin with minerals tablet tablet Take 1 tablet by mouth Daily.      OLANZapine (ZyPREXA) 15 MG tablet Take 1 tablet by mouth Every Night. 30 tablet 2    ondansetron (Zofran) 4 MG tablet Take 1 tablet by mouth Every 8 (Eight) Hours As Needed for Nausea or Vomiting. 30 tablet 2    ondansetron ODT (ZOFRAN-ODT) 4 MG disintegrating tablet Place 1 tablet under the tongue.      pantoprazole (PROTONIX) 40 MG EC tablet Take 1 tablet by mouth Daily. 90 tablet 3    polyethylene glycol (MIRALAX) 17 GM/SCOOP powder Take 17 g by mouth Daily.      rifAMPin (Rifadin) 300 MG capsule Take 1 capsule by mouth 2 (Two) Times a Day. 180 capsule 3    traZODone (DESYREL) 100 MG tablet Take 2 tablets by mouth Every Night. 30 tablet 5     No current facility-administered medications for this visit.       Past Medical History:  Past Medical History:   Diagnosis Date    Allergic rhinitis     Anxiety     Arthritis     Asthma     Cervical pain (neck)     Cervical radiculopathy 01/08/2019    COPD (chronic obstructive pulmonary disease)     Degeneration of lumbar intervertebral disc 01/08/2019    Depression     Hemorrhoid     Hx of degenerative disc disease     Hyperlipidemia     Leg pain     Limb swelling     Low back pain     Memory change 03/19/2018    I WILL PURSUE A MOCA, LABS AND AN MRI OF THE BRAIN. I WILL REQUEST HER RECORDS BE SENT FOR MY REVIEW. PENDING THE RESULTS OF HER MOCA, REFERRAL FOR NEUROPSYCH TESTNIG COULD BE CONSIDERED    Mild episode of  "recurrent major depressive disorder 06/05/2020    Osteoarthritis     Shortness of breath     Shoulder pain 03/21/2014         Mental Status Exam:   Hygiene:   good, wearing a mask, a little disheveled  Cooperation:  Cooperative  Eye Contact:  Good  Psychomotor Behavior:  Appropriate  Affect: nearly euthymic, mood congruent, fair variability  Mood: I can finally function  Hopelessness: Denies  Speech:  Normal  Thought Process:  Linear  Thought Content:  Normal  Suicidal:  None  Homicidal:  None  Hallucinations:  None  Delusion:  None  Memory:  Intact  Orientation:  Person, Place, Time and Situation  Reliability:  fair  Insight:  Fair  Judgement:  Fair  Impulse Control:  Fair  Physical/Medical Issues:  No      Review of Systems:  Review of Systems   Constitutional:  Positive for activity change and fatigue.   HENT:  Positive for congestion, sore throat and trouble swallowing.    Eyes:  Positive for visual disturbance.   Respiratory:  Positive for cough and shortness of breath.    Cardiovascular:  Positive for chest pain.   Endocrine: Positive for cold intolerance and heat intolerance.   Genitourinary:  Positive for difficulty urinating.   Musculoskeletal:  Positive for arthralgias, gait problem and neck pain.   Allergic/Immunologic: Positive for immunocompromised state.   Neurological:  Positive for dizziness, weakness, light-headedness and numbness.         Physical Exam:  Physical Exam    Vital Signs:   /78   Pulse 81   Ht 154.9 cm (61\")   Wt 39.5 kg (87 lb)   SpO2 96%   BMI 16.44 kg/m²      Lab Results:   Lab on 11/20/2024   Component Date Value Ref Range Status    Vitamin B-12 11/20/2024 692  211 - 946 pg/mL Final    Folate 11/20/2024 >20.00  4.78 - 24.20 ng/mL Final    TSH 11/20/2024 1.480  0.270 - 4.200 uIU/mL Final    Free T4 11/20/2024 0.90 (L)  0.92 - 1.68 ng/dL Final    Glucose 11/20/2024 84  65 - 99 mg/dL Final    BUN 11/20/2024 10  8 - 23 mg/dL Final    Creatinine 11/20/2024 0.76  0.57 - 1.00 " mg/dL Final    Sodium 11/20/2024 142  136 - 145 mmol/L Final    Potassium 11/20/2024 4.0  3.5 - 5.2 mmol/L Final    Chloride 11/20/2024 102  98 - 107 mmol/L Final    CO2 11/20/2024 29.1 (H)  22.0 - 29.0 mmol/L Final    Calcium 11/20/2024 9.5  8.6 - 10.5 mg/dL Final    Total Protein 11/20/2024 7.5  6.0 - 8.5 g/dL Final    Albumin 11/20/2024 3.8  3.5 - 5.2 g/dL Final    ALT (SGPT) 11/20/2024 15  1 - 33 U/L Final    AST (SGOT) 11/20/2024 25  1 - 32 U/L Final    Alkaline Phosphatase 11/20/2024 112  39 - 117 U/L Final    Total Bilirubin 11/20/2024 0.2  0.0 - 1.2 mg/dL Final    Globulin 11/20/2024 3.7  gm/dL Final    A/G Ratio 11/20/2024 1.0  g/dL Final    BUN/Creatinine Ratio 11/20/2024 13.2  7.0 - 25.0 Final    Anion Gap 11/20/2024 10.9  5.0 - 15.0 mmol/L Final    eGFR 11/20/2024 81.8  >60.0 mL/min/1.73 Final    WBC 11/20/2024 6.54  3.40 - 10.80 10*3/mm3 Final    RBC 11/20/2024 4.12  3.77 - 5.28 10*6/mm3 Final    Hemoglobin 11/20/2024 13.4  12.0 - 15.9 g/dL Final    Hematocrit 11/20/2024 39.3  34.0 - 46.6 % Final    MCV 11/20/2024 95.4  79.0 - 97.0 fL Final    MCH 11/20/2024 32.5  26.6 - 33.0 pg Final    MCHC 11/20/2024 34.1  31.5 - 35.7 g/dL Final    RDW 11/20/2024 13.2  12.3 - 15.4 % Final    RDW-SD 11/20/2024 45.9  37.0 - 54.0 fl Final    MPV 11/20/2024 10.4  6.0 - 12.0 fL Final    Platelets 11/20/2024 269  140 - 450 10*3/mm3 Final    Neutrophil % 11/20/2024 44.9  42.7 - 76.0 % Final    Lymphocyte % 11/20/2024 41.9  19.6 - 45.3 % Final    Monocyte % 11/20/2024 11.3  5.0 - 12.0 % Final    Eosinophil % 11/20/2024 0.9  0.3 - 6.2 % Final    Basophil % 11/20/2024 0.8  0.0 - 1.5 % Final    Immature Grans % 11/20/2024 0.2  0.0 - 0.5 % Final    Neutrophils, Absolute 11/20/2024 2.94  1.70 - 7.00 10*3/mm3 Final    Lymphocytes, Absolute 11/20/2024 2.74  0.70 - 3.10 10*3/mm3 Final    Monocytes, Absolute 11/20/2024 0.74  0.10 - 0.90 10*3/mm3 Final    Eosinophils, Absolute 11/20/2024 0.06  0.00 - 0.40 10*3/mm3 Final    Basophils,  Absolute 11/20/2024 0.05  0.00 - 0.20 10*3/mm3 Final    Immature Grans, Absolute 11/20/2024 0.01  0.00 - 0.05 10*3/mm3 Final    nRBC 11/20/2024 0.0  0.0 - 0.2 /100 WBC Final   Admission on 08/18/2024, Discharged on 08/18/2024   Component Date Value Ref Range Status    QT Interval 08/18/2024 368  ms Final    QTC Interval 08/18/2024 435  ms Final    Glucose 08/18/2024 231 (H)  65 - 99 mg/dL Final    BUN 08/18/2024 14  8 - 23 mg/dL Final    Creatinine 08/18/2024 0.83  0.57 - 1.00 mg/dL Final    Sodium 08/18/2024 134 (L)  136 - 145 mmol/L Final    Potassium 08/18/2024 4.0  3.5 - 5.2 mmol/L Final    Slight hemolysis detected by analyzer. Result may be falsely elevated.    Chloride 08/18/2024 99  98 - 107 mmol/L Final    CO2 08/18/2024 24.4  22.0 - 29.0 mmol/L Final    Calcium 08/18/2024 8.9  8.6 - 10.5 mg/dL Final    Total Protein 08/18/2024 7.2  6.0 - 8.5 g/dL Final    Albumin 08/18/2024 3.5  3.5 - 5.2 g/dL Final    ALT (SGPT) 08/18/2024 15  1 - 33 U/L Final    AST (SGOT) 08/18/2024 26  1 - 32 U/L Final    Alkaline Phosphatase 08/18/2024 81  39 - 117 U/L Final    Total Bilirubin 08/18/2024 0.2  0.0 - 1.2 mg/dL Final    Globulin 08/18/2024 3.7  gm/dL Final    A/G Ratio 08/18/2024 0.9  g/dL Final    BUN/Creatinine Ratio 08/18/2024 16.9  7.0 - 25.0 Final    Anion Gap 08/18/2024 10.6  5.0 - 15.0 mmol/L Final    eGFR 08/18/2024 73.6  >60.0 mL/min/1.73 Final    Ethanol 08/18/2024 <10  0 - 10 mg/dL Final    Ethanol % 08/18/2024 <0.010  % Final    Amphet/Methamphet, Screen 08/18/2024 Negative  Negative Final    Barbiturates Screen, Urine 08/18/2024 Negative  Negative Final    Benzodiazepine Screen, Urine 08/18/2024 Positive (A)  Negative Final    Cocaine Screen, Urine 08/18/2024 Negative  Negative Final    Opiate Screen 08/18/2024 Negative  Negative Final    THC, Screen, Urine 08/18/2024 Negative  Negative Final    Methadone Screen, Urine 08/18/2024 Negative  Negative Final    Oxycodone Screen, Urine 08/18/2024 Negative   Negative Final    Fentanyl, Urine 08/18/2024 Negative  Negative Final    Acetaminophen 08/18/2024 <5.0  0.0 - 30.0 mcg/mL Final    Salicylate 08/18/2024 <0.3  <=30.0 mg/dL Final    TSH 08/18/2024 1.940  0.270 - 4.200 uIU/mL Final    Free T4 08/18/2024 1.19  0.92 - 1.68 ng/dL Final    HCG Quantitative 08/18/2024 2.71  mIU/mL Final    Extra Tube 08/18/2024 Hold for add-ons.   Final    Auto resulted.    Extra Tube 08/18/2024 hold for add-on   Final    Auto resulted    Extra Tube 08/18/2024 Hold for add-ons.   Final    Auto resulted.    Extra Tube 08/18/2024 Hold for add-ons.   Final    Auto resulted    WBC 08/18/2024 6.92  3.40 - 10.80 10*3/mm3 Final    RBC 08/18/2024 3.98  3.77 - 5.28 10*6/mm3 Final    Hemoglobin 08/18/2024 12.3  12.0 - 15.9 g/dL Final    Hematocrit 08/18/2024 36.6  34.0 - 46.6 % Final    MCV 08/18/2024 92.0  79.0 - 97.0 fL Final    MCH 08/18/2024 30.9  26.6 - 33.0 pg Final    MCHC 08/18/2024 33.6  31.5 - 35.7 g/dL Final    RDW 08/18/2024 14.1  12.3 - 15.4 % Final    RDW-SD 08/18/2024 47.6  37.0 - 54.0 fl Final    MPV 08/18/2024 9.7  6.0 - 12.0 fL Final    Platelets 08/18/2024 255  140 - 450 10*3/mm3 Final    Neutrophil % 08/18/2024 69.3  42.7 - 76.0 % Final    Lymphocyte % 08/18/2024 24.3  19.6 - 45.3 % Final    Monocyte % 08/18/2024 5.1  5.0 - 12.0 % Final    Eosinophil % 08/18/2024 0.3  0.3 - 6.2 % Final    Basophil % 08/18/2024 0.7  0.0 - 1.5 % Final    Immature Grans % 08/18/2024 0.3  0.0 - 0.5 % Final    Neutrophils, Absolute 08/18/2024 4.80  1.70 - 7.00 10*3/mm3 Final    Lymphocytes, Absolute 08/18/2024 1.68  0.70 - 3.10 10*3/mm3 Final    Monocytes, Absolute 08/18/2024 0.35  0.10 - 0.90 10*3/mm3 Final    Eosinophils, Absolute 08/18/2024 0.02  0.00 - 0.40 10*3/mm3 Final    Basophils, Absolute 08/18/2024 0.05  0.00 - 0.20 10*3/mm3 Final    Immature Grans, Absolute 08/18/2024 0.02  0.00 - 0.05 10*3/mm3 Final    nRBC 08/18/2024 0.0  0.0 - 0.2 /100 WBC Final    COVID19 08/18/2024 Not Detected  Not  Detected - Ref. Range Final    Influenza A PCR 08/18/2024 Not Detected  Not Detected Final    Influenza B PCR 08/18/2024 Not Detected  Not Detected Final    RSV, PCR 08/18/2024 Not Detected  Not Detected Final    Color, UA 08/18/2024 Yellow  Yellow, Straw Final    Appearance, UA 08/18/2024 Clear  Clear Final    pH, UA 08/18/2024 7.0  5.0 - 8.0 Final    Specific Gravity, UA 08/18/2024 1.006  1.005 - 1.030 Final    Glucose, UA 08/18/2024 Negative  Negative Final    Ketones, UA 08/18/2024 Negative  Negative Final    Bilirubin, UA 08/18/2024 Negative  Negative Final    Blood, UA 08/18/2024 Negative  Negative Final    Protein, UA 08/18/2024 Negative  Negative Final    Leuk Esterase, UA 08/18/2024 Negative  Negative Final    Nitrite, UA 08/18/2024 Negative  Negative Final    Urobilinogen, UA 08/18/2024 0.2 E.U./dL  0.2 - 1.0 E.U./dL Final    Glucose 08/18/2024 101 (H)  70 - 99 mg/dL Final    Serial Number: [id number]:  [id number]   Admission on 08/13/2024, Discharged on 08/13/2024   Component Date Value Ref Range Status    Glucose 08/13/2024 84  65 - 99 mg/dL Final    BUN 08/13/2024 9  8 - 23 mg/dL Final    Creatinine 08/13/2024 0.80  0.57 - 1.00 mg/dL Final    Sodium 08/13/2024 141  136 - 145 mmol/L Final    Potassium 08/13/2024 4.3  3.5 - 5.2 mmol/L Final    Chloride 08/13/2024 104  98 - 107 mmol/L Final    CO2 08/13/2024 26.4  22.0 - 29.0 mmol/L Final    Calcium 08/13/2024 9.5  8.6 - 10.5 mg/dL Final    Total Protein 08/13/2024 7.5  6.0 - 8.5 g/dL Final    Albumin 08/13/2024 3.6  3.5 - 5.2 g/dL Final    ALT (SGPT) 08/13/2024 13  1 - 33 U/L Final    AST (SGOT) 08/13/2024 24  1 - 32 U/L Final    Alkaline Phosphatase 08/13/2024 100  39 - 117 U/L Final    Total Bilirubin 08/13/2024 0.2  0.0 - 1.2 mg/dL Final    Globulin 08/13/2024 3.9  gm/dL Final    A/G Ratio 08/13/2024 0.9  g/dL Final    BUN/Creatinine Ratio 08/13/2024 11.3  7.0 - 25.0 Final    Anion Gap 08/13/2024 10.6  5.0 - 15.0 mmol/L Final    eGFR 08/13/2024  76.9  >60.0 mL/min/1.73 Final    WBC 08/13/2024 7.92  3.40 - 10.80 10*3/mm3 Final    RBC 08/13/2024 4.25  3.77 - 5.28 10*6/mm3 Final    Hemoglobin 08/13/2024 13.2  12.0 - 15.9 g/dL Final    Hematocrit 08/13/2024 39.4  34.0 - 46.6 % Final    MCV 08/13/2024 92.7  79.0 - 97.0 fL Final    MCH 08/13/2024 31.1  26.6 - 33.0 pg Final    MCHC 08/13/2024 33.5  31.5 - 35.7 g/dL Final    RDW 08/13/2024 13.9  12.3 - 15.4 % Final    RDW-SD 08/13/2024 47.3  37.0 - 54.0 fl Final    MPV 08/13/2024 9.3  6.0 - 12.0 fL Final    Platelets 08/13/2024 264  140 - 450 10*3/mm3 Final    Neutrophil % 08/13/2024 67.5  42.7 - 76.0 % Final    Lymphocyte % 08/13/2024 23.5  19.6 - 45.3 % Final    Monocyte % 08/13/2024 7.8  5.0 - 12.0 % Final    Eosinophil % 08/13/2024 0.3  0.3 - 6.2 % Final    Basophil % 08/13/2024 0.6  0.0 - 1.5 % Final    Immature Grans % 08/13/2024 0.3  0.0 - 0.5 % Final    Neutrophils, Absolute 08/13/2024 5.35  1.70 - 7.00 10*3/mm3 Final    Lymphocytes, Absolute 08/13/2024 1.86  0.70 - 3.10 10*3/mm3 Final    Monocytes, Absolute 08/13/2024 0.62  0.10 - 0.90 10*3/mm3 Final    Eosinophils, Absolute 08/13/2024 0.02  0.00 - 0.40 10*3/mm3 Final    Basophils, Absolute 08/13/2024 0.05  0.00 - 0.20 10*3/mm3 Final    Immature Grans, Absolute 08/13/2024 0.02  0.00 - 0.05 10*3/mm3 Final    nRBC 08/13/2024 0.0  0.0 - 0.2 /100 WBC Final    Ethanol 08/13/2024 <10  0 - 10 mg/dL Final    Ethanol % 08/13/2024 <0.010  % Final    Acetaminophen 08/13/2024 <5.0  0.0 - 30.0 mcg/mL Final    Salicylate 08/13/2024 0.4  <=30.0 mg/dL Final    Amphet/Methamphet, Screen 08/13/2024 Negative  Negative Final    Barbiturates Screen, Urine 08/13/2024 Negative  Negative Final    Benzodiazepine Screen, Urine 08/13/2024 Positive (A)  Negative Final    Cocaine Screen, Urine 08/13/2024 Negative  Negative Final    Opiate Screen 08/13/2024 Negative  Negative Final    THC, Screen, Urine 08/13/2024 Negative  Negative Final    Methadone Screen, Urine 08/13/2024 Negative   Negative Final    Oxycodone Screen, Urine 08/13/2024 Negative  Negative Final    Fentanyl, Urine 08/13/2024 Negative  Negative Final    Magnesium 08/13/2024 2.4  1.6 - 2.4 mg/dL Final    Vitamin B-12 08/13/2024 748  211 - 946 pg/mL Final    QT Interval 08/13/2024 392  ms Final    QTC Interval 08/13/2024 437  ms Final    TSH 08/13/2024 2.040  0.270 - 4.200 uIU/mL Final    COVID19 08/13/2024 Not Detected  Not Detected - Ref. Range Final    Influenza A PCR 08/13/2024 Not Detected  Not Detected Final    Influenza B PCR 08/13/2024 Not Detected  Not Detected Final    RSV, PCR 08/13/2024 Not Detected  Not Detected Final    Color, UA 08/13/2024 Yellow  Yellow, Straw Final    Appearance, UA 08/13/2024 Clear  Clear Final    pH, UA 08/13/2024 8.0  5.0 - 8.0 Final    Specific Gravity, UA 08/13/2024 1.008  1.005 - 1.030 Final    Glucose, UA 08/13/2024 Negative  Negative Final    Ketones, UA 08/13/2024 Negative  Negative Final    Bilirubin, UA 08/13/2024 Negative  Negative Final    Blood, UA 08/13/2024 Negative  Negative Final    Protein, UA 08/13/2024 Negative  Negative Final    Leuk Esterase, UA 08/13/2024 Negative  Negative Final    Nitrite, UA 08/13/2024 Negative  Negative Final    Urobilinogen, UA 08/13/2024 0.2 E.U./dL  0.2 - 1.0 E.U./dL Final   Hospital Outpatient Visit on 08/09/2024   Component Date Value Ref Range Status    QT Interval 08/09/2024 386  ms Final    QTC Interval 08/09/2024 454  ms Final   Lab on 08/09/2024   Component Date Value Ref Range Status    Glucose 08/09/2024 130 (H)  65 - 99 mg/dL Final    BUN 08/09/2024 11  8 - 23 mg/dL Final    Creatinine 08/09/2024 0.97  0.57 - 1.00 mg/dL Final    Sodium 08/09/2024 141  136 - 145 mmol/L Final    Potassium 08/09/2024 4.2  3.5 - 5.2 mmol/L Final    Chloride 08/09/2024 100  98 - 107 mmol/L Final    CO2 08/09/2024 30.0 (H)  22.0 - 29.0 mmol/L Final    Calcium 08/09/2024 10.2  8.6 - 10.5 mg/dL Final    Total Protein 08/09/2024 8.1  6.0 - 8.5 g/dL Final    Albumin  08/09/2024 4.1  3.5 - 5.2 g/dL Final    ALT (SGPT) 08/09/2024 21  1 - 33 U/L Final    AST (SGOT) 08/09/2024 35 (H)  1 - 32 U/L Final    Alkaline Phosphatase 08/09/2024 126 (H)  39 - 117 U/L Final    Total Bilirubin 08/09/2024 0.3  0.0 - 1.2 mg/dL Final    Globulin 08/09/2024 4.0  gm/dL Final    A/G Ratio 08/09/2024 1.0  g/dL Final    BUN/Creatinine Ratio 08/09/2024 11.3  7.0 - 25.0 Final    Anion Gap 08/09/2024 11.0  5.0 - 15.0 mmol/L Final    eGFR 08/09/2024 61.1  >60.0 mL/min/1.73 Final    AFB Culture 08/10/2024 No AFB isolated at 6 weeks   Final    AFB Stain 08/10/2024 No acid fast bacilli seen on direct smear   Final    AFB Stain 08/10/2024 No acid fast bacilli seen on concentrated smear   Final    WBC 08/09/2024 9.56  3.40 - 10.80 10*3/mm3 Final    RBC 08/09/2024 4.54  3.77 - 5.28 10*6/mm3 Final    Hemoglobin 08/09/2024 13.7  12.0 - 15.9 g/dL Final    Hematocrit 08/09/2024 43.2  34.0 - 46.6 % Final    MCV 08/09/2024 95.2  79.0 - 97.0 fL Final    MCH 08/09/2024 30.2  26.6 - 33.0 pg Final    MCHC 08/09/2024 31.7  31.5 - 35.7 g/dL Final    RDW 08/09/2024 12.3  12.3 - 15.4 % Final    RDW-SD 08/09/2024 42.4  37.0 - 54.0 fl Final    MPV 08/09/2024 9.8  6.0 - 12.0 fL Final    Platelets 08/09/2024 330  140 - 450 10*3/mm3 Final    Neutrophil % 08/09/2024 61.4  42.7 - 76.0 % Final    Lymphocyte % 08/09/2024 30.8  19.6 - 45.3 % Final    Monocyte % 08/09/2024 7.1  5.0 - 12.0 % Final    Eosinophil % 08/09/2024 0.2 (L)  0.3 - 6.2 % Final    Basophil % 08/09/2024 0.3  0.0 - 1.5 % Final    Immature Grans % 08/09/2024 0.2  0.0 - 0.5 % Final    Neutrophils, Absolute 08/09/2024 5.87  1.70 - 7.00 10*3/mm3 Final    Lymphocytes, Absolute 08/09/2024 2.94  0.70 - 3.10 10*3/mm3 Final    Monocytes, Absolute 08/09/2024 0.68  0.10 - 0.90 10*3/mm3 Final    Eosinophils, Absolute 08/09/2024 0.02  0.00 - 0.40 10*3/mm3 Final    Basophils, Absolute 08/09/2024 0.03  0.00 - 0.20 10*3/mm3 Final    Immature Grans, Absolute 08/09/2024 0.02  0.00 -  0.05 10*3/mm3 Final    nRBC 08/09/2024 0.0  0.0 - 0.2 /100 WBC Final   Lab on 07/10/2024   Component Date Value Ref Range Status    Glucose 07/10/2024 86  65 - 99 mg/dL Final    BUN 07/10/2024 15  8 - 23 mg/dL Final    Creatinine 07/10/2024 0.89  0.57 - 1.00 mg/dL Final    Sodium 07/10/2024 142  136 - 145 mmol/L Final    Potassium 07/10/2024 4.2  3.5 - 5.2 mmol/L Final    Chloride 07/10/2024 101  98 - 107 mmol/L Final    CO2 07/10/2024 27.4  22.0 - 29.0 mmol/L Final    Calcium 07/10/2024 9.8  8.6 - 10.5 mg/dL Final    Total Protein 07/10/2024 8.4  6.0 - 8.5 g/dL Final    Albumin 07/10/2024 4.2  3.5 - 5.2 g/dL Final    ALT (SGPT) 07/10/2024 20  1 - 33 U/L Final    AST (SGOT) 07/10/2024 35 (H)  1 - 32 U/L Final    Alkaline Phosphatase 07/10/2024 112  39 - 117 U/L Final    Total Bilirubin 07/10/2024 0.2  0.0 - 1.2 mg/dL Final    Globulin 07/10/2024 4.2  gm/dL Final    A/G Ratio 07/10/2024 1.0  g/dL Final    BUN/Creatinine Ratio 07/10/2024 16.9  7.0 - 25.0 Final    Anion Gap 07/10/2024 13.6  5.0 - 15.0 mmol/L Final    eGFR 07/10/2024 67.7  >60.0 mL/min/1.73 Final    Blood Culture 07/10/2024 No growth at 5 days   Final    Blood Culture 07/10/2024 No growth at 5 days   Final    WBC 07/10/2024 6.51  3.40 - 10.80 10*3/mm3 Final    RBC 07/10/2024 4.78  3.77 - 5.28 10*6/mm3 Final    Hemoglobin 07/10/2024 14.8  12.0 - 15.9 g/dL Final    Hematocrit 07/10/2024 45.9  34.0 - 46.6 % Final    MCV 07/10/2024 96.0  79.0 - 97.0 fL Final    MCH 07/10/2024 31.0  26.6 - 33.0 pg Final    MCHC 07/10/2024 32.2  31.5 - 35.7 g/dL Final    RDW 07/10/2024 12.1 (L)  12.3 - 15.4 % Final    RDW-SD 07/10/2024 42.9  37.0 - 54.0 fl Final    MPV 07/10/2024 10.3  6.0 - 12.0 fL Final    Platelets 07/10/2024 330  140 - 450 10*3/mm3 Final    Neutrophil % 07/10/2024 51.6  42.7 - 76.0 % Final    Lymphocyte % 07/10/2024 38.9  19.6 - 45.3 % Final    Monocyte % 07/10/2024 7.4  5.0 - 12.0 % Final    Eosinophil % 07/10/2024 1.1  0.3 - 6.2 % Final    Basophil %  07/10/2024 0.8  0.0 - 1.5 % Final    Immature Grans % 07/10/2024 0.2  0.0 - 0.5 % Final    Neutrophils, Absolute 07/10/2024 3.37  1.70 - 7.00 10*3/mm3 Final    Lymphocytes, Absolute 07/10/2024 2.53  0.70 - 3.10 10*3/mm3 Final    Monocytes, Absolute 07/10/2024 0.48  0.10 - 0.90 10*3/mm3 Final    Eosinophils, Absolute 07/10/2024 0.07  0.00 - 0.40 10*3/mm3 Final    Basophils, Absolute 07/10/2024 0.05  0.00 - 0.20 10*3/mm3 Final    Immature Grans, Absolute 07/10/2024 0.01  0.00 - 0.05 10*3/mm3 Final    nRBC 07/10/2024 0.0  0.0 - 0.2 /100 WBC Final   Hospital Outpatient Visit on 07/09/2024   Component Date Value Ref Range Status    EF(MOD-bp) 07/09/2024 60.2  % Final    LVIDd 07/09/2024 3.2  cm Final    LVIDs 07/09/2024 2.16  cm Final    IVSd 07/09/2024 0.68  cm Final    LVPWd 07/09/2024 0.91  cm Final    FS 07/09/2024 32.9  % Final    IVS/LVPW 07/09/2024 0.74  cm Final    ESV(cubed) 07/09/2024 10.1  ml Final    EDV(cubed) 07/09/2024 33.4  ml Final    LV mass(C)d 07/09/2024 65.0  grams Final    LVOT area 07/09/2024 2.27  cm2 Final    LVOT diam 07/09/2024 1.70  cm Final    EDV(MOD-sp2) 07/09/2024 48.1  ml Final    EDV(MOD-sp4) 07/09/2024 46.1  ml Final    ESV(MOD-sp2) 07/09/2024 19.1  ml Final    ESV(MOD-sp4) 07/09/2024 17.0  ml Final    SV(MOD-sp2) 07/09/2024 29.0  ml Final    SV(MOD-sp4) 07/09/2024 29.1  ml Final    EF(MOD-sp2) 07/09/2024 60.3  % Final    EF(MOD-sp4) 07/09/2024 63.1  % Final    MV E max chacorta 07/09/2024 92.4  cm/sec Final    MV A max chacorta 07/09/2024 101.0  cm/sec Final    MV dec time 07/09/2024 0.32  sec Final    MV E/A 07/09/2024 0.91   Final    Pulm A Revs Dur 07/09/2024 0.12  sec Final    LA ESV Index (BP) 07/09/2024 12.0  ml/m2 Final    Med Peak E' Chacorta 07/09/2024 9.6  cm/sec Final    Lat Peak E' Chacorta 07/09/2024 11.8  cm/sec Final    TR max chacorta 07/09/2024 289.0  cm/sec Final    Avg E/e' ratio 07/09/2024 8.64   Final    SV(LVOT) 07/09/2024 57.4  ml Final    SV(RVOT) 07/09/2024 30.2  ml Final    Qp/Qs  07/09/2024 0.53   Final    RVIDd 07/09/2024 2.08  cm Final    TAPSE (>1.6) 07/09/2024 1.77  cm Final    RV S' 07/09/2024 15.4  cm/sec Final    LA dimension (2D)  07/09/2024 2.30  cm Final    Pulm Sys Chacorta 07/09/2024 91.9  cm/sec Final    Pulm Gannon Chacorta 07/09/2024 60.7  cm/sec Final    Pulm S/D 07/09/2024 1.51   Final    Pulm A Revs Chacorta 07/09/2024 27.9  cm/sec Final    LV V1 max 07/09/2024 138.0  cm/sec Final    LV V1 max PG 07/09/2024 7.6  mmHg Final    LV V1 mean PG 07/09/2024 2.00  mmHg Final    LV V1 VTI 07/09/2024 25.3  cm Final    Ao pk chacorta 07/09/2024 144.0  cm/sec Final    Ao max PG 07/09/2024 8.3  mmHg Final    Ao mean PG 07/09/2024 4.0  mmHg Final    Ao V2 VTI 07/09/2024 30.2  cm Final    LYNN(I,D) 07/09/2024 1.90  cm2 Final    AI P1/2t 07/09/2024 611.7  msec Final    MV mean PG 07/09/2024 2.00  mmHg Final    MV V2 VTI 07/09/2024 31.9  cm Final    MV P1/2t 07/09/2024 96.0  msec Final    MVA(P1/2t) 07/09/2024 2.29  cm2 Final    MVA(VTI) 07/09/2024 1.80  cm2 Final    MV dec slope 07/09/2024 308.0  cm/sec2 Final    TR max PG 07/09/2024 33.4  mmHg Final    RVSP(TR) 07/09/2024 38.4  mmHg Final    RAP systole 07/09/2024 5.0  mmHg Final    RVOT diam 07/09/2024 1.60  cm Final    RV V1 max PG 07/09/2024 2.00  mmHg Final    RV V1 max 07/09/2024 70.7  cm/sec Final    RV V1 VTI 07/09/2024 15.0  cm Final    PA V2 max 07/09/2024 71.1  cm/sec Final    Ao root diam 07/09/2024 2.6  cm Final    ACS 07/09/2024 1.30  cm Final    IVRT 07/09/2024 67.0  ms Final    Dimensionless Index 07/09/2024 0.84  (DI) Final    Ascending aorta 07/09/2024 2.5  cm Final   Orders Only on 06/27/2024   Component Date Value Ref Range Status    AFB Culture 07/01/2024 No AFB isolated at 6 weeks   Final    AFB Stain 07/01/2024 No acid fast bacilli seen on direct smear   Final    AFB Stain 07/01/2024 No acid fast bacilli seen on concentrated smear   Final    AFB Culture 07/01/2024 Other (Organism type) (DO NOT USE)   Final    Unable to isolate sufficient  AFB for identification due to gross contamination Performed by [facility]                   [address]                    [place] [address]     AFB Stain 07/01/2024 No acid fast bacilli seen on direct smear (C)   Final    AFB Stain 07/01/2024 No acid fast bacilli seen on concentrated smear (C)   Final    AFB Stain 07/01/2024 Acid fast bacilli seen on concentrated smear (C)   Final    AFB Culture 07/01/2024 No AFB isolated at 6 weeks   Final    AFB Stain 07/01/2024 No acid fast bacilli seen on direct smear   Final    AFB Stain 07/01/2024 No acid fast bacilli seen on concentrated smear   Final   Lab on 04/16/2024   Component Date Value Ref Range Status    Glucose 04/16/2024 92  65 - 99 mg/dL Final    BUN 04/16/2024 18  8 - 23 mg/dL Final    Creatinine 04/16/2024 0.90  0.57 - 1.00 mg/dL Final    Sodium 04/16/2024 145  136 - 145 mmol/L Final    Potassium 04/16/2024 4.0  3.5 - 5.2 mmol/L Final    Chloride 04/16/2024 102  98 - 107 mmol/L Final    CO2 04/16/2024 29.3 (H)  22.0 - 29.0 mmol/L Final    Calcium 04/16/2024 10.0  8.6 - 10.5 mg/dL Final    Total Protein 04/16/2024 7.5  6.0 - 8.5 g/dL Final    Albumin 04/16/2024 4.2  3.5 - 5.2 g/dL Final    ALT (SGPT) 04/16/2024 17  1 - 33 U/L Final    AST (SGOT) 04/16/2024 34 (H)  1 - 32 U/L Final    Alkaline Phosphatase 04/16/2024 90  39 - 117 U/L Final    Total Bilirubin 04/16/2024 0.3  0.0 - 1.2 mg/dL Final    Globulin 04/16/2024 3.3  gm/dL Final    A/G Ratio 04/16/2024 1.3  g/dL Final    BUN/Creatinine Ratio 04/16/2024 20.0  7.0 - 25.0 Final    Anion Gap 04/16/2024 13.7  5.0 - 15.0 mmol/L Final    eGFR 04/16/2024 67.2  >60.0 mL/min/1.73 Final    Total Cholesterol 04/16/2024 210 (H)  0 - 200 mg/dL Final    Triglycerides 04/16/2024 102  0 - 150 mg/dL Final    HDL Cholesterol 04/16/2024 71 (H)  40 - 60 mg/dL Final    LDL Cholesterol  04/16/2024 121 (H)  0 - 100 mg/dL Final    VLDL Cholesterol 04/16/2024 18  5 - 40 mg/dL Final    LDL/HDL  Ratio 04/16/2024 1.67   Final    Vitamin B-12 04/16/2024 733  211 - 946 pg/mL Final    Folate 04/16/2024 >20.00  4.78 - 24.20 ng/mL Final    Sjogren's Anti-SS-A 04/16/2024 <0.2  0.0 - 0.9 AI Final    Sjogren's Anti-SS-B 04/16/2024 <0.2  0.0 - 0.9 AI Final    TSH 04/16/2024 1.890  0.270 - 4.200 uIU/mL Final    Free T4 04/16/2024 1.20  0.93 - 1.70 ng/dL Final    T4 results may be falsely increased if patient taking Biotin.    Phosphorus 04/16/2024 3.8  2.5 - 4.5 mg/dL Final   Admission on 02/15/2024, Discharged on 02/15/2024   Component Date Value Ref Range Status    QT Interval 02/15/2024 389  ms Final    QTC Interval 02/15/2024 458  ms Final    Glucose 02/15/2024 115 (H)  65 - 99 mg/dL Final    BUN 02/15/2024 11  8 - 23 mg/dL Final    Creatinine 02/15/2024 0.84  0.57 - 1.00 mg/dL Final    Sodium 02/15/2024 138  136 - 145 mmol/L Final    Potassium 02/15/2024 3.9  3.5 - 5.2 mmol/L Final    Chloride 02/15/2024 98  98 - 107 mmol/L Final    CO2 02/15/2024 24.7  22.0 - 29.0 mmol/L Final    Calcium 02/15/2024 10.0  8.6 - 10.5 mg/dL Final    Total Protein 02/15/2024 8.1  6.0 - 8.5 g/dL Final    Albumin 02/15/2024 4.5  3.5 - 5.2 g/dL Final    ALT (SGPT) 02/15/2024 14  1 - 33 U/L Final    AST (SGOT) 02/15/2024 27  1 - 32 U/L Final    Alkaline Phosphatase 02/15/2024 88  39 - 117 U/L Final    Total Bilirubin 02/15/2024 0.4  0.0 - 1.2 mg/dL Final    Globulin 02/15/2024 3.6  gm/dL Final    A/G Ratio 02/15/2024 1.3  g/dL Final    BUN/Creatinine Ratio 02/15/2024 13.1  7.0 - 25.0 Final    Anion Gap 02/15/2024 15.3 (H)  5.0 - 15.0 mmol/L Final    eGFR 02/15/2024 73.0  >60.0 mL/min/1.73 Final    Acetaminophen 02/15/2024 <5.0  0.0 - 30.0 mcg/mL Final    Ethanol 02/15/2024 <10  0 - 10 mg/dL Final    Ethanol % 02/15/2024 <0.010  % Final    Salicylate 02/15/2024 <0.3  <=30.0 mg/dL Final    Amphet/Methamphet, Screen 02/15/2024 Negative  Negative Final    Barbiturates Screen, Urine 02/15/2024 Negative  Negative Final    Benzodiazepine  Screen, Urine 02/15/2024 Negative  Negative Final    Cocaine Screen, Urine 02/15/2024 Negative  Negative Final    Opiate Screen 02/15/2024 Negative  Negative Final    THC, Screen, Urine 02/15/2024 Negative  Negative Final    Methadone Screen, Urine 02/15/2024 Negative  Negative Final    Oxycodone Screen, Urine 02/15/2024 Negative  Negative Final    Fentanyl, Urine 02/15/2024 Negative  Negative Final    HS Troponin T 02/15/2024 15 (H)  <14 ng/L Final    Extra Tube 02/15/2024 Hold for add-ons.   Final    Auto resulted.    Extra Tube 02/15/2024 hold for add-on   Final    Auto resulted    Extra Tube 02/15/2024 Hold for add-ons.   Final    Auto resulted.    Extra Tube 02/15/2024 Hold for add-ons.   Final    Auto resulted    WBC 02/15/2024 7.09  3.40 - 10.80 10*3/mm3 Final    RBC 02/15/2024 4.72  3.77 - 5.28 10*6/mm3 Final    Hemoglobin 02/15/2024 14.6  12.0 - 15.9 g/dL Final    Hematocrit 02/15/2024 44.1  34.0 - 46.6 % Final    MCV 02/15/2024 93.4  79.0 - 97.0 fL Final    MCH 02/15/2024 30.9  26.6 - 33.0 pg Final    MCHC 02/15/2024 33.1  31.5 - 35.7 g/dL Final    RDW 02/15/2024 14.0  12.3 - 15.4 % Final    RDW-SD 02/15/2024 48.8  37.0 - 54.0 fl Final    MPV 02/15/2024 10.0  6.0 - 12.0 fL Final    Platelets 02/15/2024 215  140 - 450 10*3/mm3 Final    Neutrophil % 02/15/2024 62.1  42.7 - 76.0 % Final    Lymphocyte % 02/15/2024 30.6  19.6 - 45.3 % Final    Monocyte % 02/15/2024 6.5  5.0 - 12.0 % Final    Eosinophil % 02/15/2024 0.1 (L)  0.3 - 6.2 % Final    Basophil % 02/15/2024 0.6  0.0 - 1.5 % Final    Immature Grans % 02/15/2024 0.1  0.0 - 0.5 % Final    Neutrophils, Absolute 02/15/2024 4.40  1.70 - 7.00 10*3/mm3 Final    Lymphocytes, Absolute 02/15/2024 2.17  0.70 - 3.10 10*3/mm3 Final    Monocytes, Absolute 02/15/2024 0.46  0.10 - 0.90 10*3/mm3 Final    Eosinophils, Absolute 02/15/2024 0.01  0.00 - 0.40 10*3/mm3 Final    Basophils, Absolute 02/15/2024 0.04  0.00 - 0.20 10*3/mm3 Final    Immature Grans, Absolute  02/15/2024 0.01  0.00 - 0.05 10*3/mm3 Final    nRBC 02/15/2024 0.0  0.0 - 0.2 /100 WBC Final    Color, UA 02/15/2024 Yellow  Yellow, Straw Final    Appearance, UA 02/15/2024 Clear  Clear Final    pH, UA 02/15/2024 7.0  5.0 - 8.0 Final    Specific Gravity, UA 02/15/2024 <=1.005  1.005 - 1.030 Final    Glucose, UA 02/15/2024 Negative  Negative Final    Ketones, UA 02/15/2024 Trace (A)  Negative Final    Bilirubin, UA 02/15/2024 Negative  Negative Final    Blood, UA 02/15/2024 Negative  Negative Final    Protein, UA 02/15/2024 Negative  Negative Final    Leuk Esterase, UA 02/15/2024 Negative  Negative Final    Nitrite, UA 02/15/2024 Negative  Negative Final    Urobilinogen, UA 02/15/2024 0.2 E.U./dL  0.2 - 1.0 E.U./dL Final   There may be more visits with results that are not included.       EKG Results:  No orders to display       Imaging Results:  No Images in the past 120 days found..      Assessment & Plan   Diagnoses and all orders for this visit:    1. 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  Dispense: 30 tablet; Refill: 2  -     traZODone (DESYREL) 100 MG tablet; Take 2 tablets by mouth Every Night.  Dispense: 30 tablet; Refill: 5    2. Mixed bipolar II disorder  -     OLANZapine (ZyPREXA) 15 MG tablet; Take 1 tablet by mouth Every Night.  Dispense: 30 tablet; Refill: 2    3. Generalized anxiety disorder  -     OLANZapine (ZyPREXA) 15 MG tablet; Take 1 tablet by mouth Every Night.  Dispense: 30 tablet; Refill: 2    4. Panic attacks        11/26/2024: Minimal change. On rifampin which reduces the concentration of olanzapine and trazodone. Increase for insomnia.    Allowed patient to freely discuss and process issues, such as:  Anxiety and depression regarding family's well-being, .  Anxiety regarding insomnia.  ... using Rogerian psychotherapeutic techniques including unconditional positive regard, reflective listening, and  demonstrating clear empathy, with the goal of ameliorating symptoms and maintaining, restoring, or improving self-esteem, adaptive skills, and ego or psychological functions (Samra et al. 1991), the long-term goal of which is to develop a better, healthier perspective and help the patient bear their circumstances more easily.  Time (minutes) spent providing supportive psychotherapy: 16  (This time is exclusive to the therapy session and separate from the time spent on activities used to meet the criteria for the E/M service (history, exam, medical decision-making).)  Start: 3:04  Stop: 3:20  Functional status: mild impairment  Treatment plan: Medication management and supportive psychotherapy  Prognosis: good  Progress: insomnia, bipolar mixed  6w    10/3/2024: Not seen in over a year. Rifampin for presumable CLAY decreases the concentration of both trazodone and olanzapine. Sleeping worse the last 3 mos; was doing better -- but not perfect -- before then. Mixed bipolar, insomnia. Increase olanzapine and trazodone temporarily. Close follow up.      7/31/23: Patient continues to make changes to meds on her own (reduced klonopin to 0.5 mg nightly), but she is finally sleeping. No changes for now.    6/13: Start gabapentin with traz and klonopin for insomnia. Close follow up.     2/14: Discussion that resolved our conflict.  Patient is still not sleeping, and has persistent anxiety.  We discussed the connection between the 2.  We will target insomnia.  Start by switching to Belsomra.  The plan is to try Belsomra plus clonazepam (we cannot discontinue clonazepam quickly as she has been on it chronically and it is a benzodiazepine) to see if she can sleep on this medication regimen.  Stop trazodone at this time.  Likely will resume and later as the patient feels the trazodone helps with anxiety.     10/6: no changes. Now off seroquel and sleeping fairly well.     9/16: Continue downward titration of Seroquel and upward  titration of trazodone.  Having some initial insomnia, but this is actually to be expected.  Also expect this to resolve in time.      9/2: Pt wants to get off seroquel for health reasons. Titrate off of it slowly.  Patient and I had a long conversation about not changing her medications on her own without telling me.  She voiced understanding and agreed to proceed.      7/22: Patient agreed to try Prozac.  Will keep us posted.     6/24: Get EKG now. If qtc reassuring will start low dose prozac and recheck. Pt is fearful of the combo of seroquel and prozac affecting her heart.     3/24: Add trazodone.     2/25: Urged patient to set up sleep study. Stop abilify, hydroxyzine (never started). Stop clonazepam. Continue seroquel 150 mg daily with miralax every few days (constipation), and start ambien.     2/14: Transition from Seroquel to Abilify.  10 mg of Abilify is equal to 100 mg of Seroquel.  To help patient with sleep, start hydroxyzine in the evenings.  Also increase melatonin.     11/15: Increase melatonin and seroquel.     10/29: Seroquel 150 xr helped, but led to severe depression. Switch to Latuda. 20 minutes of supportive psychotherapy 3 wks.    9/16: Patient has significant depression.  Insomnia is fairly controlled on multiple medications.  Mood and insomnia were better when we started Seroquel at the beginning of the year. Patient advised to back down on Klonopin to 0.5 mg nightly.  I will switch the Seroquel formulation to extended release in order to allow me to go up on the dose of the Seroquel to target bipolar hypomania.  Caution advised to the patient regarding sedation, dizziness, falls.  4 weeks.    8/18: now sleeping on klonopin 0.5 mg qhs, seroquel 75 mg qhs, melatonin 3 mg qhs. Continue.  Patient advised that if the above regimen does not work, to add an additional 25 mg of Seroquel after 1 hour, and only to add the Ambien if she is unable to sleep after 1 hour from taking that extra dose of  Seroquel.  4 weeks.    8/9: Continued insomnia.  Chronic.  Order sleep study.  Increase Seroquel and start Colace to target constipation.  See back in 4 weeks.  Patient will also start melatonin 3 mg nightly over-the-counter.  No bipolar domingo or hypomania present today; however, it is possible that bipolar could be the reason for her insomnia.  Patient's scores indicate depression and anxiety, both of which will be treated with a higher dose of Seroquel.  Consider switching to Seroquel extended release, which the patient may tolerate better.    6/8: Not sleeping again. Restart ambien. Continue Seroquel.  Continue Klonopin. Consider switching to vraylar. See back in 2 months. Status post mirtazapine, trazodone, and she never tried olanzapine.  She may discontinue Ambien for good.  Psychotherapy is deferred at this time.      Visit Diagnoses:    ICD-10-CM ICD-9-CM   1. Insomnia due to mental condition  F51.05 300.9     327.02   2. Mixed bipolar II disorder  F31.81 296.89   3. Generalized anxiety disorder  F41.1 300.02   4. Panic attacks  F41.0 300.01       PLAN:  Safety: no acute safety concerns.  Risk Assessment: Risk Assessment: Risk of self-harm acutely is low. Risk factors include mood disorder, access to weapons, recent psychosocial stressors (pandemic), family history. Protective factors include no present SI, no history of suicide attempts or self-harm in the past, no AODA, healthcare seeking, future orientation, willingness to engage in care, S[employer]ism belief system. Risk of self-harm chronically is also low, but could be further elevated in the event of treatment noncompliance and/or AODA.  Safety: No acute safety concerns.  Medications:   CONTINUE ativan 0.5 mg BID. Risks, benefits, alternatives discussed with patient including GI upset, sedation, dizziness, falls risk. After discussion of these risks and benefits, the patient voiced understanding and agreed to proceed. Vern ordered. UDS ordered.  Controlled substances agreement verbally signed.   INCREASE olanzapine 10 to 15 mg qhs. Risks, benefits, alternatives discussed with patient including nausea and vomiting, GI upset, sedation, dizziness/falls risk, akathisia, hypotension, increased appetite, lowering of seizure threshold, theoretical risk of tardive dyskinesia, movement issues. Use care when operating vehicle, vessel, or machine. After discussion of these risks and benefits, the patient voiced understanding and agreed to proceed.  INCREASE trazodone 150 to 200 mg nightly. Risks, benefits, side effects discussed with patient including GI upset, sedation, dizziness/falls risk, grogginess the following day, prolongation of the QTc interval.  After discussion of these risks and benefits, the patient voiced understanding and agreed to proceed.    CONTINUE mirtazapine 15 mg qhs. Risks, benefits, alternatives discussed with patient including GI upset, sedation, dizziness with falls risk, increased appetite.  Do not use before operating vehicle, vessel, or machine. After discussion of these risks and benefits, the patient voiced understanding and agreed to proceed.  S/P   quetiapine 50 mg qhs PRN insomnia. 10/24  gabapentin 600 mg qhs. 10/24  Klonopin 0.5 mg PO QHS PRN anxiety.   seroquel 100 mg nightly. Constip, higher cholesterol  NEVER STARTED belsomra 5 mg nightly.  latuda 40 mg PO QDAY (samples given). No effect  Mirtazapine ineffective for insomnia.  Trazodone ineffective for insomnia (constipation at 100 mg nightly).  ambien ER 12.5 qhs. Somewhat helpful for insomnia in the past.  Doxepin caused constipation.  ambien 10 mg nightly. No particular reason.  Stopped melatonin 15 mg nightly. No reason.  NEVER STARTED prozac 10 mg daily after reassuring EKG (which we have).  Therapy: Deferred.      TREATMENT PLAN/GOALS: Continue supportive psychotherapy efforts and medications as indicated. Treatment and medication options discussed during today's visit.  Patient ackowledged and verbally consented to continue with current treatment plan and was educated on the importance of compliance with treatment and follow-up appointments.    MEDICATION ISSUES:  PATRICIA reviewed as expected.  Discussed medication options and treatment plan of prescribed medication as well as the risks, benefits, and side effects including potential falls, possible impaired driving and metabolic adversities among others. Patient is agreeable to call the office with any worsening of symptoms or onset of side effects. Patient is agreeable to call 911 or go to the nearest ER should he/she begin having SI/HI. No medication side effects or related complaints today.     MEDS ORDERED DURING VISIT:  New Medications Ordered This Visit   Medications    OLANZapine (ZyPREXA) 15 MG tablet     Sig: Take 1 tablet by mouth Every Night.     Dispense:  30 tablet     Refill:  2     Replaces 10 mg dose. Thank you for the help. Please call with questions: [phone].    traZODone (DESYREL) 100 MG tablet     Sig: Take 2 tablets by mouth Every Night.     Dispense:  30 tablet     Refill:  5     Replaces 150 mg dose. Thank you for the help. Please call with questions: [phone].       Return in about 6 weeks (around 1/7/2025).         This document has been electronically signed by Susie Moran MD  November 26, 2024 15:21 EST      Part of this note may be an electronic transcription/translation of spoken language to printed text using the Dragon Dictation System.

## 2024-11-26 NOTE — PATIENT INSTRUCTIONS
1.  Please return to clinic at your next scheduled visit.  Contact the clinic ([phone]) at least 24 hours prior in the event you need to cancel.  2.  Do no harm to yourself or others.    3.  Avoid alcohol and drugs.    4.  Take all medications as prescribed.  Please contact the clinic with any concerns. If you are in need of medication refills, please call the clinic at [phone].    5. Should you want to get in touch with your provider, Dr. Susie Moran, please utilize [employer] or contact the office ([phone]), and staff will be able to page Dr. Moran directly.  6.  In the event you have personal crisis, contact the following crisis numbers: Suicide Prevention Hotline 1-122.548.5436; HARSHAL Helpline 2-930-482-HARSHAL; [facility] Emergency Room [phone]; text HELLO to 443412; or 723.

## 2024-12-03 ENCOUNTER — OFFICE VISIT (OUTPATIENT)
Dept: INTERNAL MEDICINE | Age: 74
End: 2024-12-03
Payer: MEDICARE

## 2024-12-03 VITALS
SYSTOLIC BLOOD PRESSURE: 114 MMHG | HEIGHT: 61 IN | BODY MASS INDEX: 16.58 KG/M2 | WEIGHT: 87.8 LBS | TEMPERATURE: 98.6 F | DIASTOLIC BLOOD PRESSURE: 60 MMHG | HEART RATE: 71 BPM | OXYGEN SATURATION: 98 %

## 2024-12-03 DIAGNOSIS — R53.82 CHRONIC FATIGUE: ICD-10-CM

## 2024-12-03 DIAGNOSIS — A31.0 MYCOBACTERIUM AVIUM COMPLEX: ICD-10-CM

## 2024-12-03 DIAGNOSIS — R00.2 PALPITATIONS: ICD-10-CM

## 2024-12-03 DIAGNOSIS — E78.2 MIXED HYPERLIPIDEMIA: Primary | ICD-10-CM

## 2024-12-03 DIAGNOSIS — E55.9 VITAMIN D DEFICIENCY: ICD-10-CM

## 2024-12-03 DIAGNOSIS — F51.01 PRIMARY INSOMNIA: ICD-10-CM

## 2024-12-03 DIAGNOSIS — F41.0 PANIC ATTACKS: ICD-10-CM

## 2024-12-03 DIAGNOSIS — I10 PRIMARY HYPERTENSION: ICD-10-CM

## 2024-12-03 DIAGNOSIS — R63.4 WEIGHT LOSS: ICD-10-CM

## 2024-12-03 PROCEDURE — G2211 COMPLEX E/M VISIT ADD ON: HCPCS | Performed by: INTERNAL MEDICINE

## 2024-12-03 PROCEDURE — 99214 OFFICE O/P EST MOD 30 MIN: CPT | Performed by: INTERNAL MEDICINE

## 2024-12-03 PROCEDURE — 1159F MED LIST DOCD IN RCRD: CPT | Performed by: INTERNAL MEDICINE

## 2024-12-03 PROCEDURE — 1126F AMNT PAIN NOTED NONE PRSNT: CPT | Performed by: INTERNAL MEDICINE

## 2024-12-03 PROCEDURE — 1160F RVW MEDS BY RX/DR IN RCRD: CPT | Performed by: INTERNAL MEDICINE

## 2024-12-03 RX ORDER — ALBUTEROL SULFATE 90 UG/1
2 INHALANT RESPIRATORY (INHALATION) EVERY 4 HOURS PRN
Qty: 18 G | Refills: 1 | Status: SHIPPED | OUTPATIENT
Start: 2024-12-03

## 2024-12-03 RX ORDER — LORAZEPAM 0.5 MG/1
TABLET ORAL
Qty: 60 TABLET | Refills: 0 | Status: SHIPPED | OUTPATIENT
Start: 2024-12-03

## 2024-12-03 NOTE — PROGRESS NOTES
"Chief Complaint  Follow-up (Pt states that she had labs, she states that this is routine, she states that she is wanting to discuss with you in detail about her medications. )    Subjective      Manju Mendoza presents to [facility]    History of present illness:  Patient is a 74-year-old female with underlying COPD/emphysema/RAD, as well as mild hyperlipidemia, DJD, insomnia, among others, seen 6/23 as new patient, and who is coming in 12/24 for routine 3-month follow-up.  We will go over her med list in detail, review recent labs, address her care gaps, and make further recommendations at that time.    Review of Systems   Constitutional:  Negative for appetite change, fatigue and fever.   HENT:  Negative for congestion and ear pain.    Eyes:  Negative for blurred vision.   Respiratory:  Negative for cough, chest tightness, shortness of breath and wheezing.    Cardiovascular:  Negative for chest pain, palpitations and leg swelling.   Gastrointestinal:  Negative for abdominal pain.   Genitourinary:  Negative for difficulty urinating, dysuria and hematuria.   Musculoskeletal:  Negative for arthralgias and gait problem.   Skin:  Negative for skin lesions.   Neurological:  Negative for syncope, memory problem and confusion.   Psychiatric/Behavioral:  Negative for self-injury and depressed mood.        Objective   Vital Signs:   /60   Pulse 71   Temp 98.6 °F (37 °C) (Skin)   Ht 154.9 cm (60.98\")   Wt 39.8 kg (87 lb 12.8 oz)   SpO2 98%   BMI 16.60 kg/m²           Physical Exam  Vitals and nursing note reviewed.   Constitutional:       General: She is not in acute distress.     Appearance: Normal appearance. She is not toxic-appearing.   HENT:      Head: Atraumatic.      Right Ear: External ear normal.      Left Ear: External ear normal.      Nose: Nose normal.      Mouth/Throat:      Mouth: Mucous membranes are moist.   Eyes:      General:         Right eye: No " discharge.         Left eye: No discharge.      Extraocular Movements: Extraocular movements intact.      Pupils: Pupils are equal, round, and reactive to light.   Cardiovascular:      Rate and Rhythm: Normal rate and regular rhythm.      Pulses: Normal pulses.      Heart sounds: Normal heart sounds. No murmur heard.     No gallop.   Pulmonary:      Effort: Pulmonary effort is normal. No respiratory distress.      Breath sounds: No wheezing, rhonchi or rales.   Abdominal:      General: There is no distension.      Palpations: Abdomen is soft. There is no mass.      Tenderness: There is no abdominal tenderness. There is no guarding.   Musculoskeletal:         General: No swelling or tenderness.      Cervical back: No tenderness.      Right lower leg: No edema.      Left lower leg: No edema.   Skin:     General: Skin is warm and dry.      Findings: No rash.   Neurological:      General: No focal deficit present.      Mental Status: She is alert and oriented to person, place, and time. Mental status is at baseline.      Motor: No weakness.      Gait: Gait normal.   Psychiatric:         Mood and Affect: Mood normal.         Thought Content: Thought content normal.          Result Review   The following data was reviewed by: Scar Cisneros MD on              Assessment and Plan   Diagnoses and all orders for this visit:    1. Mixed hyperlipidemia (Primary)  -     Lipid panel; Future    2. Primary hypertension  -     Comprehensive metabolic panel; Future  -     CBC w AUTO Differential; Future    3. Chronic fatigue    4. Palpitations  Overview:  Holter 7/24:  1.  Normal sinus rhythm  2.  Occasional APCs  3.  Runs of APCs  4.  No episodes of sinus arrest.  Paroxysmal atrial fibrillation or advanced AV block.     Echo 7/24:    The study is technically adequate for diagnosis.    Left ventricular systolic function is normal. Calculated left ventricular EF = 60.2%    Left ventricular diastolic function was normal.    Assessment  & Plan:  Patient is in sinus, pulse is in the 70s, patient without any severe palpitations, certainly no sustained tachycardia.  Not requiring any medications this regards, continue to observe.      5. Mycobacterium avium complex  Overview:  Chest CT 9/24:  Increased groundglass opacities and tree-in-bud nodularity in the lingula and left lower lobe, consistent with chronic atypical infectious process. Associated bronchiectasis in the inferior aspect of the left upper lobe. No pleural effusion.     No suspicious pulmonary nodule or mass.    Assessment & Plan:  Patient stable this regards, her sats are in the high 90s on room air, no labored respirations etc.  She is followed closely by pulmonology, she is on triple therapy for this, has follow-up with them after the new year.  Appreciate their expertise.      6. Weight loss  Overview:  My note 2/24:  Patient has lost 10 pounds over the past year, sounds like she may have lost 27 pounds over past several years. She has had issues with her stomach, and just last month had a EGD by Dr. Fournier with dilatation. She is going to be on Protonix for some time now as well as Pepcid to help with the gastritis and esophagitis. Discussed with patient she needs to expand her diet, fatty foods are fine for now, will worry about her cholesterol later.    Assessment & Plan:  Patient is up 3 to 4 pounds to 87 as of her 12/24 OV.  She has Megace available, looks like she is increased from 10 to 15 mL, discussed with her that she certainly welcome to go to 20 mL if her weight gain slows significantly.  Psychiatry has her on 15 of Remeron as well, appreciate their assistance.      7. Primary insomnia  Assessment & Plan:  Patient is still using low-dose lorazepam, this was started after weaning off of Klonopin.  She is seeing psychiatry, does not follow-up with them until after the new year, we will go ahead and send over a shorter term prescription and see if they intend to fill this  going forward.      8. Panic attacks  -     LORazepam (Ativan) 0.5 MG tablet; Take 1 to 2 tablets at bedtime as needed for sleep.  Dispense: 60 tablet; Refill: 0    9. Vitamin D deficiency  -     Vitamin D,25-Hydroxy; Future    Other orders  -     albuterol sulfate  (90 Base) MCG/ACT inhaler; Inhale 2 puffs Every 4 (Four) Hours As Needed for Wheezing or Shortness of Air.  Dispense: 18 g; Refill: 1                        Follow Up   Return in about 4 months (around 4/3/2025).  Patient was given instructions and counseling regarding her condition or for health maintenance advice. Please see specific information pulled into the AVS if appropriate.     Total Time Spent:   minutes     This time includes time spent by me in the following activities: preparing for the visit, reviewing extensive past medical history and tests, performing a medically appropriate examination and/or evaluation, counseling and educating the patient and/or caregivers, ordering medications, tests, or procedures, referring and/or communicating with other health care professionals and documenting information in the medical record all on this date of service.

## 2024-12-03 NOTE — ASSESSMENT & PLAN NOTE
Patient stable this regards, her sats are in the high 90s on room air, no labored respirations etc.  She is followed closely by pulmonology, she is on triple therapy for this, has follow-up with them after the new year.  Appreciate their expertise.

## 2024-12-03 NOTE — ASSESSMENT & PLAN NOTE
Patient is up 3 to 4 pounds to 87 as of her 12/24 OV.  She has Megace available, looks like she is increased from 10 to 15 mL, discussed with her that she certainly welcome to go to 20 mL if her weight gain slows significantly.  Psychiatry has her on 15 of Remeron as well, appreciate their assistance.   Yes

## 2024-12-03 NOTE — ASSESSMENT & PLAN NOTE
Patient is in sinus, pulse is in the 70s, patient without any severe palpitations, certainly no sustained tachycardia.  Not requiring any medications this regards, continue to observe.

## 2024-12-03 NOTE — ASSESSMENT & PLAN NOTE
Patient is still using low-dose lorazepam, this was started after weaning off of Klonopin.  She is seeing psychiatry, does not follow-up with them until after the new year, we will go ahead and send over a shorter term prescription and see if they intend to fill this going forward.

## 2024-12-11 PROBLEM — M54.12 CERVICAL RADICULOPATHY: Status: RESOLVED | Noted: 2019-01-08 | Resolved: 2024-12-11

## 2024-12-11 PROBLEM — R93.89 ABNORMAL CT SCAN, CHEST: Status: RESOLVED | Noted: 2024-07-31 | Resolved: 2024-12-11

## 2024-12-11 PROBLEM — I49.3 PVC'S (PREMATURE VENTRICULAR CONTRACTIONS): Status: ACTIVE | Noted: 2024-04-25

## 2024-12-12 ENCOUNTER — OFFICE VISIT (OUTPATIENT)
Dept: CARDIOLOGY | Facility: CLINIC | Age: 74
End: 2024-12-12
Payer: MEDICARE

## 2024-12-12 VITALS
WEIGHT: 88.6 LBS | DIASTOLIC BLOOD PRESSURE: 76 MMHG | HEART RATE: 74 BPM | HEIGHT: 61 IN | SYSTOLIC BLOOD PRESSURE: 146 MMHG | BODY MASS INDEX: 16.73 KG/M2

## 2024-12-12 DIAGNOSIS — R03.0 ELEVATED BLOOD PRESSURE READING: ICD-10-CM

## 2024-12-12 DIAGNOSIS — I49.3 PVC'S (PREMATURE VENTRICULAR CONTRACTIONS): Primary | ICD-10-CM

## 2024-12-12 PROCEDURE — 99213 OFFICE O/P EST LOW 20 MIN: CPT | Performed by: NURSE PRACTITIONER

## 2024-12-12 NOTE — PROGRESS NOTES
Chief Complaint  Follow-up and PVC    Subjective            History of Present Illness  Manju Mendoza is a 74-year-old female patient who presents to the office today for follow-up.  She has PVCs and is not currently prescribed any cardiac medication.  Today she denies any new or worsening cardiac symptoms. Her blood pressure is elevated today but has not been diagnosed with hypertension.     PMH  Past Medical History:   Diagnosis Date    Allergic rhinitis     Anxiety     Arthritis     Asthma     Cervical pain (neck)     Cervical radiculopathy 01/08/2019    COPD (chronic obstructive pulmonary disease)     Degeneration of lumbar intervertebral disc 01/08/2019    Depression     Hemorrhoid     Hx of degenerative disc disease     Hyperlipidemia     Leg pain     Limb swelling     Low back pain     Memory change 03/19/2018    I WILL PURSUE A MOCA, LABS AND AN MRI OF THE BRAIN. I WILL REQUEST HER RECORDS BE SENT FOR MY REVIEW. PENDING THE RESULTS OF HER MOCA, REFERRAL FOR NEUROPSYCH TESTNIG COULD BE CONSIDERED    Mild episode of recurrent major depressive disorder 06/05/2020    Mycobacterium avium complex     Osteoarthritis     Shortness of breath     Shoulder pain 03/21/2014         ALLERGY  Allergies   Allergen Reactions    Codeine Palpitations    Nsaids GI Intolerance          SURGICALHX  Past Surgical History:   Procedure Laterality Date    APPENDECTOMY      BLADDER REPAIR      CARPAL TUNNEL RELEASE  1990    CHOLECYSTECTOMY  1978    COLONOSCOPY  2009    ENDOSCOPY      2013, 2009    ENDOSCOPY N/A 1/25/2024    Procedure: ESOPHAGOGASTRODUODENOSCOPY WITH BIOPSIES, BALLOON DILATATION 15-18;  Surgeon: Shawna Fournier MD;  Location: [facility] ENDOSCOPY;  Service: Gastroenterology;  Laterality: N/A;  ESPOHAGITIS AND GASTRITIS, ESOPHAGEAL STRICTURE    GALLBLADDER SURGERY      HYSTERECTOMY  1985    OTHER SURGICAL HISTORY      METAL IMPLANTS    OTHER SURGICAL HISTORY      SURGICAL CLIPS    SHOULDER SURGERY       TONSILLECTOMY      UPPER GASTROINTESTINAL ENDOSCOPY            SOC  Social History     Socioeconomic History    Marital status:    Tobacco Use    Smoking status: Former     Current packs/day: 0.00     Average packs/day: 0.3 packs/day for 5.0 years (1.3 ttl pk-yrs)     Types: Cigarettes     Start date:      Quit date:      Years since quittin.9    Smokeless tobacco: Never   Vaping Use    Vaping status: Never Used   Substance and Sexual Activity    Alcohol use: Never    Drug use: Never    Sexual activity: Not Currently         FAMHX  Family History   Problem Relation Age of Onset    Heart disease Mother     Cancer Mother     Colon cancer Mother 76    Heart attack Mother     Osteoporosis Mother     Arthritis Mother     Osteoporosis Father     Arthritis Father     Stroke Sister     Heart disease Sister     Cancer Sister     Diabetes Sister     Osteoporosis Sister     Arthritis Sister     Heart disease Brother     Diabetes Brother     Arthritis Brother     Stroke Brother     Osteoporosis Brother     Depression Other     Anxiety disorder Other           MEDSIGONLY  Current Outpatient Medications on File Prior to Visit   Medication Sig    acetaminophen (TYLENOL) 500 MG tablet Take 1 tablet by mouth Every 6 (Six) Hours As Needed.    azithromycin (ZITHROMAX) 250 MG tablet Take 1 tablet by mouth 3 (Three) Times a Week. (Patient taking differently: Take 1 tablet by mouth 3 (Three) Times a Week. SUN, WED, FRI)    ethambutol (MYAMBUTOL) 400 MG tablet Take 1.5 tablets by mouth Daily.    Ferrous Sulfate (IRON PO) Take 1 tablet by mouth Daily.    lidocaine (LIDODERM) 5 % Place 1 patch on the skin as directed by provider Daily. Remove & Discard patch within 12 hours or as directed by MD    LORazepam (Ativan) 0.5 MG tablet Take 1 to 2 tablets at bedtime as needed for sleep.    mirtazapine (REMERON) 15 MG tablet Take 1 tablet by mouth every night at bedtime.    multivitamin with minerals tablet tablet Take 1 tablet  "by mouth Daily.    OLANZapine (ZyPREXA) 15 MG tablet Take 1 tablet by mouth Every Night.    pantoprazole (PROTONIX) 40 MG EC tablet Take 1 tablet by mouth Daily.    polyethylene glycol (MIRALAX) 17 GM/SCOOP powder Take 17 g by mouth Daily.    rifAMPin (Rifadin) 300 MG capsule Take 1 capsule by mouth 2 (Two) Times a Day.    traZODone (DESYREL) 100 MG tablet Take 2 tablets by mouth Every Night.    [DISCONTINUED] albuterol sulfate  (90 Base) MCG/ACT inhaler Inhale 2 puffs Every 4 (Four) Hours As Needed for Wheezing or Shortness of Air. (Patient not taking: Reported on 12/12/2024)    [DISCONTINUED] famotidine (PEPCID) 20 MG tablet Take 1 tablet by mouth 2 (Two) Times a Day. (Patient not taking: Reported on 12/12/2024)    [DISCONTINUED] megestrol (MEGACE) 40 MG/ML suspension Take 10 mL every morning, may increase to 20 mL if not gaining weight. (Patient not taking: Reported on 12/12/2024)    [DISCONTINUED] ondansetron (Zofran) 4 MG tablet Take 1 tablet by mouth Every 8 (Eight) Hours As Needed for Nausea or Vomiting. (Patient not taking: Reported on 12/12/2024)    [DISCONTINUED] ondansetron ODT (ZOFRAN-ODT) 4 MG disintegrating tablet Place 1 tablet under the tongue. (Patient not taking: Reported on 12/12/2024)     No current facility-administered medications on file prior to visit.         Objective   /76   Pulse 74   Ht 154.9 cm (60.98\")   Wt 40.2 kg (88 lb 9.6 oz)   BMI 16.75 kg/m²       Physical Exam  HENT:      Head: Normocephalic.   Neck:      Vascular: No carotid bruit.   Cardiovascular:      Rate and Rhythm: Normal rate and regular rhythm.      Pulses: Normal pulses.      Heart sounds: Normal heart sounds. No murmur heard.  Pulmonary:      Effort: Pulmonary effort is normal.      Breath sounds: Normal breath sounds.   Musculoskeletal:      Cervical back: Neck supple.      Right lower leg: No edema.      Left lower leg: No edema.   Skin:     General: Skin is dry.   Neurological:      Mental Status: " "She is alert and oriented to person, place, and time.   Psychiatric:         Behavior: Behavior normal.       Result Review :   The following data was reviewed by: JAKE Mckee on 12/12/2024:  No results found for: \"PROBNP\"  CMP          11/20/2024    17:05   CMP   Glucose 84    BUN 10    Creatinine 0.76    EGFR 81.8    Sodium 142    Potassium 4.0    Chloride 102    Calcium 9.5    Total Protein 7.5    Albumin 3.8    Globulin 3.7    Total Bilirubin 0.2    Alkaline Phosphatase 112    AST (SGOT) 25    ALT (SGPT) 15    Albumin/Globulin Ratio 1.0    BUN/Creatinine Ratio 13.2    Anion Gap 10.9      CBC w/diff          11/20/2024    17:05   CBC w/Diff   WBC 6.54    RBC 4.12    Hemoglobin 13.4    Hematocrit 39.3    MCV 95.4    MCH 32.5    MCHC 34.1    RDW 13.2    Platelets 269    Neutrophil Rel % 44.9    Immature Granulocyte Rel % 0.2    Lymphocyte Rel % 41.9    Monocyte Rel % 11.3    Eosinophil Rel % 0.9    Basophil Rel % 0.8       Lab Results   Component Value Date    TSH 1.480 11/20/2024      Lab Results   Component Value Date    FREET4 0.90 (L) 11/20/2024      No results found for: \"DDIMERQUANT\"  Magnesium   Date Value Ref Range Status   08/13/2024 2.4 1.6 - 2.4 mg/dL Final      No results found for: \"DIGOXIN\"   Lab Results   Component Value Date    TROPONINT 15 (H) 02/15/2024           Results for orders placed during the hospital encounter of 07/09/24    Adult Transthoracic Echo Complete W/ Cont if Necessary Per Protocol    Interpretation Summary    The study is technically adequate for diagnosis.    Left ventricular systolic function is normal. Calculated left ventricular EF = 60.2%    Left ventricular diastolic function was normal.           Assessment and Plan    Diagnoses and all orders for this visit:    1. PVC's (premature ventricular contractions) (Primary)  Symptomatically stable at this time, continue to monitor for more frequent palpitations.  Recommend avoiding caffeine intake.    2. Elevated blood " pressure reading  Check blood pressure twice a day for the next two weeks, blood pressure log provided for patient.  Will review log once available to me will make any necessary medication recommendations at that time.  Low-sodium diet discussed.            Follow Up   Return in about 1 year (around 12/12/2025) for Follow up with Dr Allison.    Patient was given instructions and counseling regarding her condition or for health maintenance advice. Please see specific information pulled into the AVS if appropriate.     Manju FOX Mendoza  reports that she quit smoking about 37 years ago. Her smoking use included cigarettes. She started smoking about 40 years ago. She has a 1.3 pack-year smoking history. She has never used smokeless tobacco.            Liberty Cox, JAKE  12/12/24  14:46 EST    Dictated Utilizing Dragon Dictation

## 2024-12-16 DIAGNOSIS — F41.1 GENERALIZED ANXIETY DISORDER: ICD-10-CM

## 2024-12-16 DIAGNOSIS — F31.81 MIXED BIPOLAR II DISORDER: ICD-10-CM

## 2024-12-16 DIAGNOSIS — F51.05 INSOMNIA DUE TO MENTAL CONDITION: ICD-10-CM

## 2024-12-16 RX ORDER — OLANZAPINE 10 MG/1
10 TABLET ORAL NIGHTLY
Qty: 90 TABLET | Refills: 0 | Status: SHIPPED | OUTPATIENT
Start: 2024-12-16

## 2024-12-16 RX ORDER — MIRTAZAPINE 15 MG/1
15 TABLET, FILM COATED ORAL
Qty: 90 TABLET | Refills: 0 | Status: SHIPPED | OUTPATIENT
Start: 2024-12-16 | End: 2025-03-16

## 2024-12-16 NOTE — TELEPHONE ENCOUNTER
PT PHONED IN.    SHE NEVER DID  THE PRESCRIPTION FOR THE 15 MG ZYPREXA BECAUSE SHE DECIDED SHE DID NOT WANT TO INCREASE IT.  OLANZapine (ZyPREXA) 15 MG tablet (11/26/2024)     SHE IS WANTING TO STAY ON THE ZYPREXA 10 MG INSTEAD.  OLANZapine (ZyPREXA) 10 MG tablet (10/03/2024)     SHE IS NEEDING A NEW PRESCRIPTION FOR THIS MEDICATION.    PT IS ALSO REQUESTING A 90 DAY SUPPLY PRESCRIPTION OF HER REMERON 15 MG TABLETS.  mirtazapine (REMERON) 15 MG tablet (09/18/2024)     ROUTING TO COVERING PROVIDER.

## 2025-01-07 ENCOUNTER — TELEPHONE (OUTPATIENT)
Dept: PSYCHIATRY | Facility: CLINIC | Age: 75
End: 2025-01-07
Payer: MEDICARE

## 2025-01-07 NOTE — TELEPHONE ENCOUNTER
PT(PATIENT) VERIFIED     PT(PATIENT) STATES SHE IS STILL HAVING ISSUES WITH INSOMNIA, AND NOW ALSO HAS INCREASED ANXIETY     mirtazapine (REMERON) 15 MG tablet (2024)  traZODone (DESYREL) 100 MG tablet (2024)    PT(PATIENT) STATES SHE IS TAKING HER MEDICATIONS BUT THEY ARE NOT HELPING HER TO SLEEP, NOR HELPING WITH HER ANXIETY     PT(PATIENT) REQUESTED TO SPEAK WITH PROVIDER DIRECTLY     NEXT APPT WITH PROVIDER   Appointment with Susie Moran MD (2025)     PLEASE ADVISE

## 2025-01-07 NOTE — TELEPHONE ENCOUNTER
Called pt. Admits she takes seroquel every now and again to help her sleep. Took 2 last night and slept some (25 mg each).    Pt advised to reduce olanzapine and add seroquel to her regimen: thus it will become olanzapine 5 mg qhs, seroquel 50 mg qhs, trazodone 200 mg qhs, mirtazapine 15 mg qhs. All questions answered. No SI HI AVH.

## 2025-01-14 ENCOUNTER — HOSPITAL ENCOUNTER (OUTPATIENT)
Dept: CT IMAGING | Facility: HOSPITAL | Age: 75
Discharge: HOME | End: 2025-01-14
Admitting: NURSE PRACTITIONER
Payer: MEDICARE

## 2025-01-14 DIAGNOSIS — A31.0 MYCOBACTERIUM AVIUM COMPLEX: ICD-10-CM

## 2025-01-14 DIAGNOSIS — R93.89 ABNORMAL CT SCAN, CHEST: ICD-10-CM

## 2025-01-14 PROCEDURE — 71250 CT THORAX DX C-: CPT

## 2025-01-16 ENCOUNTER — OFFICE VISIT (OUTPATIENT)
Dept: PSYCHIATRY | Facility: CLINIC | Age: 75
End: 2025-01-16
Payer: MEDICARE

## 2025-01-16 VITALS
DIASTOLIC BLOOD PRESSURE: 70 MMHG | HEIGHT: 61 IN | WEIGHT: 89 LBS | BODY MASS INDEX: 16.8 KG/M2 | SYSTOLIC BLOOD PRESSURE: 152 MMHG | HEART RATE: 70 BPM

## 2025-01-16 DIAGNOSIS — F41.1 GENERALIZED ANXIETY DISORDER: ICD-10-CM

## 2025-01-16 DIAGNOSIS — F41.0 PANIC ATTACKS: ICD-10-CM

## 2025-01-16 DIAGNOSIS — F31.81 MIXED BIPOLAR II DISORDER: ICD-10-CM

## 2025-01-16 DIAGNOSIS — F51.05 INSOMNIA DUE TO MENTAL CONDITION: Primary | ICD-10-CM

## 2025-01-16 RX ORDER — LORAZEPAM 0.5 MG/1
TABLET ORAL
Qty: 60 TABLET | Refills: 5 | Status: SHIPPED | OUTPATIENT
Start: 2025-01-16

## 2025-01-16 RX ORDER — TRAZODONE HYDROCHLORIDE 100 MG/1
200 TABLET ORAL NIGHTLY
Qty: 60 TABLET | Refills: 5 | Status: SHIPPED | OUTPATIENT
Start: 2025-01-16

## 2025-01-16 RX ORDER — MIRTAZAPINE 15 MG/1
15 TABLET, FILM COATED ORAL
Qty: 90 TABLET | Refills: 3 | Status: SHIPPED | OUTPATIENT
Start: 2025-01-16

## 2025-01-16 RX ORDER — QUETIAPINE FUMARATE 100 MG/1
100 TABLET, FILM COATED ORAL NIGHTLY
Qty: 90 TABLET | Refills: 1 | Status: SHIPPED | OUTPATIENT
Start: 2025-01-16

## 2025-01-16 NOTE — TREATMENT PLAN
Anxiety:  4/10 progressing    Goals:  Patient will develop and implement behavioral and cognitive strategies to reduce anxiety and irrational fears, monthly, using Rogerian psychotherapy and CBT where appropriate.  Help patient explore past emotional issues in relation to present anxiety, monthly, until remission of symptoms, using Rogerian psychotherapy and CBT where appropriate.  Help patient develop an awareness of their cognitive and physical responses to anxiety, monthly, until remission of symptoms, using Rogerian psychotherapy and CBT where appropriate.          Depression:  3/10 progressing    Goals:  Patient will demonstrate the ability to initiate new constructive life skills outside of sessions on a consistent basis, monthly, using Rogerian psychotherapy and CBT where appropriate.  Assist patient in setting attainable activities of daily living goals, monthly, using Rogerian psychotherapy and CBT where appropriate.  Provide education about depression, monthly, using Rogerian psychotherapy and CBT where appropriate.  Assist patient in developing healthy coping strategies, monthly, using Rogerian psychotherapy and CBT where appropriate.    Rogerian psychotherapy and CBT will be used to help accomplish the above goals and manage depression and anxiety related to insomnia, medical conditions, family well-being       I have discussed and reviewed this treatment plan with the patient.      Reviewed by Susie Moran MD   01/16/2025

## 2025-01-16 NOTE — PROGRESS NOTES
"Subjective   Manju Mendoza is a 74 y.o. female who presents today for follow up    Chief Complaint: Bipolar 2    History of Present Illness:    Manju Mendoza is a 70 year old /White female who presents to the office today referred by Dustin Pennington DO.     Chart review 22: Seen . History of chronic anxiety and insomnia. Sleeping better on Ambien 12.5 at night extended release. Also on Klonopin 1 mg at night. Mirtazapine 15 mg at night. Olanzapine 5 mg at night. Trazodone 50 mg at night.  labs: Lipids are elevated, LFTs normal, creatinine 0.79, hematocrit 43, electrolytes normal, TSH 1.44, free T4 1.1, folate is normal, B12 is normal. No head imaging or EKG.     Chart review:   2025: cards, int med; added seroquel to her regimen and reduced olanzapine.   2024: reassuring B12, folate, TSH, low fT4, abnl cmp c02 29.1; reassuring cbc. Missed her 10/23/24 appnt.  2024: not seen in over a year.  Patient never followed up in 10 days like she was supposed to.  Seen by int med.   Cologuard neg  Swallow study done, functional deficit .    Plannin2024: Minimal change. On rifampin which reduces the concentration of olanzapine and trazodone. Increase for insomnia.  10/3/2024: Not seen in over a year. Rifampin for presumable CLAY decreases the concentration of both trazodone and olanzapine. Sleeping worse the last 3 mos; was doing better -- but not perfect -- before then. Mixed bipolar, insomnia. Increase olanzapine and trazodone temporarily. Close follow up.  23: Patient continues to make changes to meds on her own (reduced klonopin to 0.5 mg nightly), but she is finally sleeping. No changes for now.      Visits (Below):  \"Manju.\" Her parents called her Shae and she didn't like that.  Refuses to do a sleep study.  Pt likes to eat gum  P4 2025:   Interview:  \"I get to sleep but I'm waking up.\"  My lungs aren't getting better  The " "antibiotics get in the way of the meds working  Now just terminal insomnia  Mood/Depression: mild depressed mood improving  Anxiety: excessive worrying improving  Panic attacks: stable  Energy: down  Concentration: baseline low  Insomnia: initial and maintenance resolved; now terminal insomnia only at 3 to 4 am.  Eatin, 87, 86 lbs  Refills: y  Substances: def  Therapy: n  Medication compliant: y  SE: none  No SI HI AVH.      2024:   Interview:  \"I'm still not sleeping.\"  Getting sleep, but not enough.   Initial insomnia 3-4 hours some nights, also maintenance insomnia  Still repeating the same pattern: doesn't sleep well for 2 nights, then sleeps well the third night out of exhaustion  Mood/Depression: mild depressed mood  Anxiety: excessive worrying  Panic attacks: stable  Energy: down  Concentration: baseline low  Insomnia: initial and maintenance  Eatin, 86 lbs  Refills: y  Substances: def  Therapy: n  Medication compliant: y  SE: none  No SI HI AVH.      10/3/2024:   Interview:  \"I'm not doing good.\"   I'm sleeping, but only on and off  I spent 3 days at [place]  I never slept well  I was at [facility] for 9 days  I never slept well  Compliant on all medications, taking every night  Sometimes sleeps, sometimes doesn't  Sleeps every third night  I was diagnosed with a very severe lung infection, TB-like  Mood/Depression:   Anxiety: minimal  Panic attacks: n  Energy: improved  Concentration: baseline low  Sleeping:   Eatin lbs  Refills: y  Substances: def  Therapy: n  Medication compliant: y  SE: some grogginess in the mornings/hungover feeling  No SI HI AVH.      23:   In person interview:  \"Most nights I sleep some.\"  Improved.   Mood/Depression: good mood, improved  Anxiety: minimal  Panic attacks: n  Energy: improved  Concentration: baseline low  Sleeping:  Getting enough to function.   Most nights I get to sleep  Some nights I have trouble going to sleep, but \"not real often'  Eating: " "2 lb wl 6/23  Refills: y  Substances: def  Therapy: n  Medication compliant: y  SE: some grogginess in the mornings/hungover feeling  No SI HI AVH.        PHQ-9 Depression Screening  Little interest or pleasure in doing things?     Feeling down, depressed, or hopeless?     Trouble falling or staying asleep, or sleeping too much?     Feeling tired or having little energy?     Poor appetite or overeating?     Feeling bad about yourself - or that you are a failure or have let yourself or your family down?     Trouble concentrating on things, such as reading the newspaper or watching television?     Moving or speaking so slowly that other people could have noticed? Or the opposite - being so fidgety or restless that you have been moving around a lot more than usual?     Thoughts that you would be better off dead, or of hurting yourself in some way?     PHQ-9 Total Score     If you checked off any problems, how difficult have these problems made it for you to do your work, take care of things at home, or get along with other people?           1/19 H&P:   Virtual visit via Zoom audio due to the COVID-19 pandemic for 46 minutes. Patient could not start the video. Patient is accepting of and agreeable to appointment. The appointment consisted of the patient and I only. Interview: Patient reports a lack of sleep for several years. She reports it has intensified over the last few months, she is unclear why. Also endorses some anxiety and depressive symptoms. What I noted today was that she was very difficult to interrupt, and was tangential, in addition to her insomnia. She denies ever being diagnosed with bipolar disorder, but she was trialed on Depakote and Seroquel in the past. She reports that Seroquel did help her sleep. She does not remember when she was placed on it. She also feels that her mirtazapine is making it harder for her to sleep, and is \"making her angrier,\" which does not surprise me.   Endorses muscle " "tension, fatigue, poor concentration, restlessness, irritability, insomnia. Now that she is taking Ambien, Klonopin, she is sleeping about 6 hours a night. Also endorses feelings of worthlessness. Also states \"I cry a lot.\"   Denies SI HI AVH. Has access to weapons that are locked away. Psychiatric review of systems is positive for anxiety and depression, possible domingo, negative for psychosis.   ...   Past Psychiatric History:  Began Psychiatric Treatment: When she was 25 years old   Dx: Anxiety, depression, insomnia   Psychiatrist: Dr. Pennington has been taking care of her for 1 year   Therapist: Julian   : Denies   Admissions History: Has been admitted 3 times, the last time was 20 years ago.   Medication Trials: Multiple. Prozac, trazodone, Zoloft, Paxil, Cymbalta which worsened her insomnia, Seroquel helped her sleep. Depakote gave her hallucinations. She has never tried olanzapine or Abilify. She avoids antipsychotics because they cause weight gain.   Self-Harm: Denies   Suicide Attempts: Denies   Substance Abuse History:  Types: Denies all, including illicit   Withdrawl Symptoms: Not applicable   Longest period sober: Not applicable   AA: N/A   Admissions History: Denies   Residential History: Denies   Legal: N/A   Social History:  Marital Status:    Employed: No     Kids: 2 children   House: Lives in a house    Hx: No  history   Family History:  Suicide Attempts: Maternal grandfather committed suicide   Suicide Completions: Maternal grandfather committed suicide   Substance Use: Patient's daughter polysubstance use   Psychiatric Conditions: Her mom and brother both been on psychiatric medications    depression, psychosis, anxiety: Patient became extremely paranoid after having her daughter, she did not have any treatment. After she had her son, she began to have insomnia   Developmental History:  Born: Deferred   Siblings: Deferred   Childhood: Deferred   High School: " Deferred   College: Deferred     Past Surgical History:  Past Surgical History:   Procedure Laterality Date    APPENDECTOMY      BLADDER REPAIR      CARPAL TUNNEL RELEASE  1990    CHOLECYSTECTOMY  1978    COLONOSCOPY  2009    ENDOSCOPY      2013, 2009    ENDOSCOPY N/A 1/25/2024    Procedure: ESOPHAGOGASTRODUODENOSCOPY WITH BIOPSIES, BALLOON DILATATION 15-18;  Surgeon: Shawna Fournier MD;  Location: [facility] ENDOSCOPY;  Service: Gastroenterology;  Laterality: N/A;  ESPOHAGITIS AND GASTRITIS, ESOPHAGEAL STRICTURE    GALLBLADDER SURGERY      HYSTERECTOMY  1985    OTHER SURGICAL HISTORY      METAL IMPLANTS    OTHER SURGICAL HISTORY      SURGICAL CLIPS    SHOULDER SURGERY      TONSILLECTOMY      UPPER GASTROINTESTINAL ENDOSCOPY         Problem List:  Patient Active Problem List   Diagnosis    Panlobular emphysema    Lumbar spondylosis    Mixed hyperlipidemia    Primary insomnia    Memory change    Moderate episode of recurrent major depressive disorder    Lymphocytosis    Well adult exam    Claudication    Medication monitoring encounter    Oropharyngeal dysphagia    Xerostomia    Weight loss    Chronic idiopathic constipation    PVC's (premature ventricular contractions)    Mycobacterium avium complex    Tobacco abuse, in remission    Elevated blood pressure reading       Allergy:   Allergies   Allergen Reactions    Codeine Palpitations    Nsaids GI Intolerance        Discontinued Medications:  Medications Discontinued During This Encounter   Medication Reason    OLANZapine (ZyPREXA) 10 MG tablet Not Efficacious    LORazepam (Ativan) 0.5 MG tablet Reorder    traZODone (DESYREL) 100 MG tablet Reorder    mirtazapine (REMERON) 15 MG tablet Reorder               Current Medications:   Current Outpatient Medications   Medication Sig Dispense Refill    acetaminophen (TYLENOL) 500 MG tablet Take 1 tablet by mouth Every 6 (Six) Hours As Needed.      azithromycin (ZITHROMAX) 250 MG tablet Take 1 tablet by mouth 3 (Three)  Times a Week. (Patient taking differently: Take 1 tablet by mouth 3 (Three) Times a Week. SUN, WED, FRI) 30 tablet 11    ethambutol (MYAMBUTOL) 400 MG tablet Take 1.5 tablets by mouth Daily. 30 tablet 9    Ferrous Sulfate (IRON PO) Take 1 tablet by mouth Daily.      lidocaine (LIDODERM) 5 % Place 1 patch on the skin as directed by provider Daily. Remove & Discard patch within 12 hours or as directed by MD 90 each 3    LORazepam (Ativan) 0.5 MG tablet Take 1 to 2 tablets at bedtime as needed for sleep. 60 tablet 5    mirtazapine (REMERON) 15 MG tablet Take 1 tablet by mouth every night at bedtime. 90 tablet 3    multivitamin with minerals tablet tablet Take 1 tablet by mouth Daily.      pantoprazole (PROTONIX) 40 MG EC tablet Take 1 tablet by mouth Daily. 90 tablet 3    polyethylene glycol (MIRALAX) 17 GM/SCOOP powder Take 17 g by mouth Daily.      rifAMPin (Rifadin) 300 MG capsule Take 1 capsule by mouth 2 (Two) Times a Day. 180 capsule 3    traZODone (DESYREL) 100 MG tablet Take 2 tablets by mouth Every Night. 60 tablet 5    QUEtiapine (SEROquel) 100 MG tablet Take 1 tablet by mouth Every Night. 90 tablet 1     No current facility-administered medications for this visit.       Past Medical History:  Past Medical History:   Diagnosis Date    Allergic rhinitis     Anxiety     Arthritis     Asthma     Cervical pain (neck)     Cervical radiculopathy 01/08/2019    COPD (chronic obstructive pulmonary disease)     Degeneration of lumbar intervertebral disc 01/08/2019    Depression     Hemorrhoid     Hx of degenerative disc disease     Hyperlipidemia     Leg pain     Limb swelling     Low back pain     Memory change 03/19/2018    I WILL PURSUE A MOCA, LABS AND AN MRI OF THE BRAIN. I WILL REQUEST HER RECORDS BE SENT FOR MY REVIEW. PENDING THE RESULTS OF HER MOCA, REFERRAL FOR NEUROPSYCH TESTNIG COULD BE CONSIDERED    Mild episode of recurrent major depressive disorder 06/05/2020    Mycobacterium avium complex      "Osteoarthritis     Shortness of breath     Shoulder pain 03/21/2014         Mental Status Exam:   Hygiene:   good, wearing a mask, a little disheveled  Cooperation:  Cooperative  Eye Contact:  Good  Psychomotor Behavior:  Appropriate  Affect: euthymic, mood congruent, fair variability  Mood: \"I can sleep but I wake early\"  Hopelessness: Denies  Speech:  Normal  Thought Process:  Linear  Thought Content:  Normal  Suicidal:  None  Homicidal:  None  Hallucinations:  None  Delusion:  None  Memory:  Intact  Orientation:  Person, Place, Time and Situation  Reliability:  fair  Insight:  Fair  Judgement:  Fair  Impulse Control:  Fair  Physical/Medical Issues:  No      Review of Systems:  Review of Systems   Constitutional:  Positive for activity change and fatigue.   HENT:  Positive for congestion, sore throat and trouble swallowing.    Eyes:  Positive for visual disturbance.   Respiratory:  Positive for cough and shortness of breath.    Cardiovascular:  Positive for chest pain.   Endocrine: Positive for cold intolerance and heat intolerance.   Genitourinary:  Positive for difficulty urinating.   Musculoskeletal:  Positive for arthralgias, gait problem and neck pain.   Allergic/Immunologic: Positive for immunocompromised state.   Neurological:  Positive for dizziness, weakness, light-headedness and numbness.         Physical Exam:  Physical Exam    Vital Signs:   /70   Pulse 70   Ht 154.9 cm (61\")   Wt 40.4 kg (89 lb)   BMI 16.82 kg/m²      Lab Results:   Lab on 11/20/2024   Component Date Value Ref Range Status    Vitamin B-12 11/20/2024 692  211 - 946 pg/mL Final    Folate 11/20/2024 >20.00  4.78 - 24.20 ng/mL Final    TSH 11/20/2024 1.480  0.270 - 4.200 uIU/mL Final    Free T4 11/20/2024 0.90 (L)  0.92 - 1.68 ng/dL Final    Glucose 11/20/2024 84  65 - 99 mg/dL Final    BUN 11/20/2024 10  8 - 23 mg/dL Final    Creatinine 11/20/2024 0.76  0.57 - 1.00 mg/dL Final    Sodium 11/20/2024 142  136 - 145 mmol/L Final    " Potassium 11/20/2024 4.0  3.5 - 5.2 mmol/L Final    Chloride 11/20/2024 102  98 - 107 mmol/L Final    CO2 11/20/2024 29.1 (H)  22.0 - 29.0 mmol/L Final    Calcium 11/20/2024 9.5  8.6 - 10.5 mg/dL Final    Total Protein 11/20/2024 7.5  6.0 - 8.5 g/dL Final    Albumin 11/20/2024 3.8  3.5 - 5.2 g/dL Final    ALT (SGPT) 11/20/2024 15  1 - 33 U/L Final    AST (SGOT) 11/20/2024 25  1 - 32 U/L Final    Alkaline Phosphatase 11/20/2024 112  39 - 117 U/L Final    Total Bilirubin 11/20/2024 0.2  0.0 - 1.2 mg/dL Final    Globulin 11/20/2024 3.7  gm/dL Final    A/G Ratio 11/20/2024 1.0  g/dL Final    BUN/Creatinine Ratio 11/20/2024 13.2  7.0 - 25.0 Final    Anion Gap 11/20/2024 10.9  5.0 - 15.0 mmol/L Final    eGFR 11/20/2024 81.8  >60.0 mL/min/1.73 Final    WBC 11/20/2024 6.54  3.40 - 10.80 10*3/mm3 Final    RBC 11/20/2024 4.12  3.77 - 5.28 10*6/mm3 Final    Hemoglobin 11/20/2024 13.4  12.0 - 15.9 g/dL Final    Hematocrit 11/20/2024 39.3  34.0 - 46.6 % Final    MCV 11/20/2024 95.4  79.0 - 97.0 fL Final    MCH 11/20/2024 32.5  26.6 - 33.0 pg Final    MCHC 11/20/2024 34.1  31.5 - 35.7 g/dL Final    RDW 11/20/2024 13.2  12.3 - 15.4 % Final    RDW-SD 11/20/2024 45.9  37.0 - 54.0 fl Final    MPV 11/20/2024 10.4  6.0 - 12.0 fL Final    Platelets 11/20/2024 269  140 - 450 10*3/mm3 Final    Neutrophil % 11/20/2024 44.9  42.7 - 76.0 % Final    Lymphocyte % 11/20/2024 41.9  19.6 - 45.3 % Final    Monocyte % 11/20/2024 11.3  5.0 - 12.0 % Final    Eosinophil % 11/20/2024 0.9  0.3 - 6.2 % Final    Basophil % 11/20/2024 0.8  0.0 - 1.5 % Final    Immature Grans % 11/20/2024 0.2  0.0 - 0.5 % Final    Neutrophils, Absolute 11/20/2024 2.94  1.70 - 7.00 10*3/mm3 Final    Lymphocytes, Absolute 11/20/2024 2.74  0.70 - 3.10 10*3/mm3 Final    Monocytes, Absolute 11/20/2024 0.74  0.10 - 0.90 10*3/mm3 Final    Eosinophils, Absolute 11/20/2024 0.06  0.00 - 0.40 10*3/mm3 Final    Basophils, Absolute 11/20/2024 0.05  0.00 - 0.20 10*3/mm3 Final    Immature  Grans, Absolute 11/20/2024 0.01  0.00 - 0.05 10*3/mm3 Final    nRBC 11/20/2024 0.0  0.0 - 0.2 /100 WBC Final   Admission on 08/18/2024, Discharged on 08/18/2024   Component Date Value Ref Range Status    QT Interval 08/18/2024 368  ms Final    QTC Interval 08/18/2024 435  ms Final    Glucose 08/18/2024 231 (H)  65 - 99 mg/dL Final    BUN 08/18/2024 14  8 - 23 mg/dL Final    Creatinine 08/18/2024 0.83  0.57 - 1.00 mg/dL Final    Sodium 08/18/2024 134 (L)  136 - 145 mmol/L Final    Potassium 08/18/2024 4.0  3.5 - 5.2 mmol/L Final    Slight hemolysis detected by analyzer. Result may be falsely elevated.    Chloride 08/18/2024 99  98 - 107 mmol/L Final    CO2 08/18/2024 24.4  22.0 - 29.0 mmol/L Final    Calcium 08/18/2024 8.9  8.6 - 10.5 mg/dL Final    Total Protein 08/18/2024 7.2  6.0 - 8.5 g/dL Final    Albumin 08/18/2024 3.5  3.5 - 5.2 g/dL Final    ALT (SGPT) 08/18/2024 15  1 - 33 U/L Final    AST (SGOT) 08/18/2024 26  1 - 32 U/L Final    Alkaline Phosphatase 08/18/2024 81  39 - 117 U/L Final    Total Bilirubin 08/18/2024 0.2  0.0 - 1.2 mg/dL Final    Globulin 08/18/2024 3.7  gm/dL Final    A/G Ratio 08/18/2024 0.9  g/dL Final    BUN/Creatinine Ratio 08/18/2024 16.9  7.0 - 25.0 Final    Anion Gap 08/18/2024 10.6  5.0 - 15.0 mmol/L Final    eGFR 08/18/2024 73.6  >60.0 mL/min/1.73 Final    Ethanol 08/18/2024 <10  0 - 10 mg/dL Final    Ethanol % 08/18/2024 <0.010  % Final    Amphet/Methamphet, Screen 08/18/2024 Negative  Negative Final    Barbiturates Screen, Urine 08/18/2024 Negative  Negative Final    Benzodiazepine Screen, Urine 08/18/2024 Positive (A)  Negative Final    Cocaine Screen, Urine 08/18/2024 Negative  Negative Final    Opiate Screen 08/18/2024 Negative  Negative Final    THC, Screen, Urine 08/18/2024 Negative  Negative Final    Methadone Screen, Urine 08/18/2024 Negative  Negative Final    Oxycodone Screen, Urine 08/18/2024 Negative  Negative Final    Fentanyl, Urine 08/18/2024 Negative  Negative Final     Acetaminophen 08/18/2024 <5.0  0.0 - 30.0 mcg/mL Final    Salicylate 08/18/2024 <0.3  <=30.0 mg/dL Final    TSH 08/18/2024 1.940  0.270 - 4.200 uIU/mL Final    Free T4 08/18/2024 1.19  0.92 - 1.68 ng/dL Final    HCG Quantitative 08/18/2024 2.71  mIU/mL Final    Extra Tube 08/18/2024 Hold for add-ons.   Final    Auto resulted.    Extra Tube 08/18/2024 hold for add-on   Final    Auto resulted    Extra Tube 08/18/2024 Hold for add-ons.   Final    Auto resulted.    Extra Tube 08/18/2024 Hold for add-ons.   Final    Auto resulted    WBC 08/18/2024 6.92  3.40 - 10.80 10*3/mm3 Final    RBC 08/18/2024 3.98  3.77 - 5.28 10*6/mm3 Final    Hemoglobin 08/18/2024 12.3  12.0 - 15.9 g/dL Final    Hematocrit 08/18/2024 36.6  34.0 - 46.6 % Final    MCV 08/18/2024 92.0  79.0 - 97.0 fL Final    MCH 08/18/2024 30.9  26.6 - 33.0 pg Final    MCHC 08/18/2024 33.6  31.5 - 35.7 g/dL Final    RDW 08/18/2024 14.1  12.3 - 15.4 % Final    RDW-SD 08/18/2024 47.6  37.0 - 54.0 fl Final    MPV 08/18/2024 9.7  6.0 - 12.0 fL Final    Platelets 08/18/2024 255  140 - 450 10*3/mm3 Final    Neutrophil % 08/18/2024 69.3  42.7 - 76.0 % Final    Lymphocyte % 08/18/2024 24.3  19.6 - 45.3 % Final    Monocyte % 08/18/2024 5.1  5.0 - 12.0 % Final    Eosinophil % 08/18/2024 0.3  0.3 - 6.2 % Final    Basophil % 08/18/2024 0.7  0.0 - 1.5 % Final    Immature Grans % 08/18/2024 0.3  0.0 - 0.5 % Final    Neutrophils, Absolute 08/18/2024 4.80  1.70 - 7.00 10*3/mm3 Final    Lymphocytes, Absolute 08/18/2024 1.68  0.70 - 3.10 10*3/mm3 Final    Monocytes, Absolute 08/18/2024 0.35  0.10 - 0.90 10*3/mm3 Final    Eosinophils, Absolute 08/18/2024 0.02  0.00 - 0.40 10*3/mm3 Final    Basophils, Absolute 08/18/2024 0.05  0.00 - 0.20 10*3/mm3 Final    Immature Grans, Absolute 08/18/2024 0.02  0.00 - 0.05 10*3/mm3 Final    nRBC 08/18/2024 0.0  0.0 - 0.2 /100 WBC Final    COVID19 08/18/2024 Not Detected  Not Detected - Ref. Range Final    Influenza A PCR 08/18/2024 Not Detected   Not Detected Final    Influenza B PCR 08/18/2024 Not Detected  Not Detected Final    RSV, PCR 08/18/2024 Not Detected  Not Detected Final    Color, UA 08/18/2024 Yellow  Yellow, Straw Final    Appearance, UA 08/18/2024 Clear  Clear Final    pH, UA 08/18/2024 7.0  5.0 - 8.0 Final    Specific Gravity, UA 08/18/2024 1.006  1.005 - 1.030 Final    Glucose, UA 08/18/2024 Negative  Negative Final    Ketones, UA 08/18/2024 Negative  Negative Final    Bilirubin, UA 08/18/2024 Negative  Negative Final    Blood, UA 08/18/2024 Negative  Negative Final    Protein, UA 08/18/2024 Negative  Negative Final    Leuk Esterase, UA 08/18/2024 Negative  Negative Final    Nitrite, UA 08/18/2024 Negative  Negative Final    Urobilinogen, UA 08/18/2024 0.2 E.U./dL  0.2 - 1.0 E.U./dL Final    Glucose 08/18/2024 101 (H)  70 - 99 mg/dL Final    Serial Number: [id number]:  [id number]   Admission on 08/13/2024, Discharged on 08/13/2024   Component Date Value Ref Range Status    Glucose 08/13/2024 84  65 - 99 mg/dL Final    BUN 08/13/2024 9  8 - 23 mg/dL Final    Creatinine 08/13/2024 0.80  0.57 - 1.00 mg/dL Final    Sodium 08/13/2024 141  136 - 145 mmol/L Final    Potassium 08/13/2024 4.3  3.5 - 5.2 mmol/L Final    Chloride 08/13/2024 104  98 - 107 mmol/L Final    CO2 08/13/2024 26.4  22.0 - 29.0 mmol/L Final    Calcium 08/13/2024 9.5  8.6 - 10.5 mg/dL Final    Total Protein 08/13/2024 7.5  6.0 - 8.5 g/dL Final    Albumin 08/13/2024 3.6  3.5 - 5.2 g/dL Final    ALT (SGPT) 08/13/2024 13  1 - 33 U/L Final    AST (SGOT) 08/13/2024 24  1 - 32 U/L Final    Alkaline Phosphatase 08/13/2024 100  39 - 117 U/L Final    Total Bilirubin 08/13/2024 0.2  0.0 - 1.2 mg/dL Final    Globulin 08/13/2024 3.9  gm/dL Final    A/G Ratio 08/13/2024 0.9  g/dL Final    BUN/Creatinine Ratio 08/13/2024 11.3  7.0 - 25.0 Final    Anion Gap 08/13/2024 10.6  5.0 - 15.0 mmol/L Final    eGFR 08/13/2024 76.9  >60.0 mL/min/1.73 Final    WBC 08/13/2024 7.92  3.40 - 10.80  10*3/mm3 Final    RBC 08/13/2024 4.25  3.77 - 5.28 10*6/mm3 Final    Hemoglobin 08/13/2024 13.2  12.0 - 15.9 g/dL Final    Hematocrit 08/13/2024 39.4  34.0 - 46.6 % Final    MCV 08/13/2024 92.7  79.0 - 97.0 fL Final    MCH 08/13/2024 31.1  26.6 - 33.0 pg Final    MCHC 08/13/2024 33.5  31.5 - 35.7 g/dL Final    RDW 08/13/2024 13.9  12.3 - 15.4 % Final    RDW-SD 08/13/2024 47.3  37.0 - 54.0 fl Final    MPV 08/13/2024 9.3  6.0 - 12.0 fL Final    Platelets 08/13/2024 264  140 - 450 10*3/mm3 Final    Neutrophil % 08/13/2024 67.5  42.7 - 76.0 % Final    Lymphocyte % 08/13/2024 23.5  19.6 - 45.3 % Final    Monocyte % 08/13/2024 7.8  5.0 - 12.0 % Final    Eosinophil % 08/13/2024 0.3  0.3 - 6.2 % Final    Basophil % 08/13/2024 0.6  0.0 - 1.5 % Final    Immature Grans % 08/13/2024 0.3  0.0 - 0.5 % Final    Neutrophils, Absolute 08/13/2024 5.35  1.70 - 7.00 10*3/mm3 Final    Lymphocytes, Absolute 08/13/2024 1.86  0.70 - 3.10 10*3/mm3 Final    Monocytes, Absolute 08/13/2024 0.62  0.10 - 0.90 10*3/mm3 Final    Eosinophils, Absolute 08/13/2024 0.02  0.00 - 0.40 10*3/mm3 Final    Basophils, Absolute 08/13/2024 0.05  0.00 - 0.20 10*3/mm3 Final    Immature Grans, Absolute 08/13/2024 0.02  0.00 - 0.05 10*3/mm3 Final    nRBC 08/13/2024 0.0  0.0 - 0.2 /100 WBC Final    Ethanol 08/13/2024 <10  0 - 10 mg/dL Final    Ethanol % 08/13/2024 <0.010  % Final    Acetaminophen 08/13/2024 <5.0  0.0 - 30.0 mcg/mL Final    Salicylate 08/13/2024 0.4  <=30.0 mg/dL Final    Amphet/Methamphet, Screen 08/13/2024 Negative  Negative Final    Barbiturates Screen, Urine 08/13/2024 Negative  Negative Final    Benzodiazepine Screen, Urine 08/13/2024 Positive (A)  Negative Final    Cocaine Screen, Urine 08/13/2024 Negative  Negative Final    Opiate Screen 08/13/2024 Negative  Negative Final    THC, Screen, Urine 08/13/2024 Negative  Negative Final    Methadone Screen, Urine 08/13/2024 Negative  Negative Final    Oxycodone Screen, Urine 08/13/2024 Negative   Negative Final    Fentanyl, Urine 08/13/2024 Negative  Negative Final    Magnesium 08/13/2024 2.4  1.6 - 2.4 mg/dL Final    Vitamin B-12 08/13/2024 748  211 - 946 pg/mL Final    QT Interval 08/13/2024 392  ms Final    QTC Interval 08/13/2024 437  ms Final    TSH 08/13/2024 2.040  0.270 - 4.200 uIU/mL Final    COVID19 08/13/2024 Not Detected  Not Detected - Ref. Range Final    Influenza A PCR 08/13/2024 Not Detected  Not Detected Final    Influenza B PCR 08/13/2024 Not Detected  Not Detected Final    RSV, PCR 08/13/2024 Not Detected  Not Detected Final    Color, UA 08/13/2024 Yellow  Yellow, Straw Final    Appearance, UA 08/13/2024 Clear  Clear Final    pH, UA 08/13/2024 8.0  5.0 - 8.0 Final    Specific Gravity, UA 08/13/2024 1.008  1.005 - 1.030 Final    Glucose, UA 08/13/2024 Negative  Negative Final    Ketones, UA 08/13/2024 Negative  Negative Final    Bilirubin, UA 08/13/2024 Negative  Negative Final    Blood, UA 08/13/2024 Negative  Negative Final    Protein, UA 08/13/2024 Negative  Negative Final    Leuk Esterase, UA 08/13/2024 Negative  Negative Final    Nitrite, UA 08/13/2024 Negative  Negative Final    Urobilinogen, UA 08/13/2024 0.2 E.U./dL  0.2 - 1.0 E.U./dL Final   Hospital Outpatient Visit on 08/09/2024   Component Date Value Ref Range Status    QT Interval 08/09/2024 386  ms Final    QTC Interval 08/09/2024 454  ms Final   Lab on 08/09/2024   Component Date Value Ref Range Status    Glucose 08/09/2024 130 (H)  65 - 99 mg/dL Final    BUN 08/09/2024 11  8 - 23 mg/dL Final    Creatinine 08/09/2024 0.97  0.57 - 1.00 mg/dL Final    Sodium 08/09/2024 141  136 - 145 mmol/L Final    Potassium 08/09/2024 4.2  3.5 - 5.2 mmol/L Final    Chloride 08/09/2024 100  98 - 107 mmol/L Final    CO2 08/09/2024 30.0 (H)  22.0 - 29.0 mmol/L Final    Calcium 08/09/2024 10.2  8.6 - 10.5 mg/dL Final    Total Protein 08/09/2024 8.1  6.0 - 8.5 g/dL Final    Albumin 08/09/2024 4.1  3.5 - 5.2 g/dL Final    ALT (SGPT) 08/09/2024 21   1 - 33 U/L Final    AST (SGOT) 08/09/2024 35 (H)  1 - 32 U/L Final    Alkaline Phosphatase 08/09/2024 126 (H)  39 - 117 U/L Final    Total Bilirubin 08/09/2024 0.3  0.0 - 1.2 mg/dL Final    Globulin 08/09/2024 4.0  gm/dL Final    A/G Ratio 08/09/2024 1.0  g/dL Final    BUN/Creatinine Ratio 08/09/2024 11.3  7.0 - 25.0 Final    Anion Gap 08/09/2024 11.0  5.0 - 15.0 mmol/L Final    eGFR 08/09/2024 61.1  >60.0 mL/min/1.73 Final    AFB Culture 08/10/2024 No AFB isolated at 6 weeks   Final    AFB Stain 08/10/2024 No acid fast bacilli seen on direct smear   Final    AFB Stain 08/10/2024 No acid fast bacilli seen on concentrated smear   Final    WBC 08/09/2024 9.56  3.40 - 10.80 10*3/mm3 Final    RBC 08/09/2024 4.54  3.77 - 5.28 10*6/mm3 Final    Hemoglobin 08/09/2024 13.7  12.0 - 15.9 g/dL Final    Hematocrit 08/09/2024 43.2  34.0 - 46.6 % Final    MCV 08/09/2024 95.2  79.0 - 97.0 fL Final    MCH 08/09/2024 30.2  26.6 - 33.0 pg Final    MCHC 08/09/2024 31.7  31.5 - 35.7 g/dL Final    RDW 08/09/2024 12.3  12.3 - 15.4 % Final    RDW-SD 08/09/2024 42.4  37.0 - 54.0 fl Final    MPV 08/09/2024 9.8  6.0 - 12.0 fL Final    Platelets 08/09/2024 330  140 - 450 10*3/mm3 Final    Neutrophil % 08/09/2024 61.4  42.7 - 76.0 % Final    Lymphocyte % 08/09/2024 30.8  19.6 - 45.3 % Final    Monocyte % 08/09/2024 7.1  5.0 - 12.0 % Final    Eosinophil % 08/09/2024 0.2 (L)  0.3 - 6.2 % Final    Basophil % 08/09/2024 0.3  0.0 - 1.5 % Final    Immature Grans % 08/09/2024 0.2  0.0 - 0.5 % Final    Neutrophils, Absolute 08/09/2024 5.87  1.70 - 7.00 10*3/mm3 Final    Lymphocytes, Absolute 08/09/2024 2.94  0.70 - 3.10 10*3/mm3 Final    Monocytes, Absolute 08/09/2024 0.68  0.10 - 0.90 10*3/mm3 Final    Eosinophils, Absolute 08/09/2024 0.02  0.00 - 0.40 10*3/mm3 Final    Basophils, Absolute 08/09/2024 0.03  0.00 - 0.20 10*3/mm3 Final    Immature Grans, Absolute 08/09/2024 0.02  0.00 - 0.05 10*3/mm3 Final    nRBC 08/09/2024 0.0  0.0 - 0.2 /100 WBC  Final   Lab on 07/10/2024   Component Date Value Ref Range Status    Glucose 07/10/2024 86  65 - 99 mg/dL Final    BUN 07/10/2024 15  8 - 23 mg/dL Final    Creatinine 07/10/2024 0.89  0.57 - 1.00 mg/dL Final    Sodium 07/10/2024 142  136 - 145 mmol/L Final    Potassium 07/10/2024 4.2  3.5 - 5.2 mmol/L Final    Chloride 07/10/2024 101  98 - 107 mmol/L Final    CO2 07/10/2024 27.4  22.0 - 29.0 mmol/L Final    Calcium 07/10/2024 9.8  8.6 - 10.5 mg/dL Final    Total Protein 07/10/2024 8.4  6.0 - 8.5 g/dL Final    Albumin 07/10/2024 4.2  3.5 - 5.2 g/dL Final    ALT (SGPT) 07/10/2024 20  1 - 33 U/L Final    AST (SGOT) 07/10/2024 35 (H)  1 - 32 U/L Final    Alkaline Phosphatase 07/10/2024 112  39 - 117 U/L Final    Total Bilirubin 07/10/2024 0.2  0.0 - 1.2 mg/dL Final    Globulin 07/10/2024 4.2  gm/dL Final    A/G Ratio 07/10/2024 1.0  g/dL Final    BUN/Creatinine Ratio 07/10/2024 16.9  7.0 - 25.0 Final    Anion Gap 07/10/2024 13.6  5.0 - 15.0 mmol/L Final    eGFR 07/10/2024 67.7  >60.0 mL/min/1.73 Final    Blood Culture 07/10/2024 No growth at 5 days   Final    Blood Culture 07/10/2024 No growth at 5 days   Final    WBC 07/10/2024 6.51  3.40 - 10.80 10*3/mm3 Final    RBC 07/10/2024 4.78  3.77 - 5.28 10*6/mm3 Final    Hemoglobin 07/10/2024 14.8  12.0 - 15.9 g/dL Final    Hematocrit 07/10/2024 45.9  34.0 - 46.6 % Final    MCV 07/10/2024 96.0  79.0 - 97.0 fL Final    MCH 07/10/2024 31.0  26.6 - 33.0 pg Final    MCHC 07/10/2024 32.2  31.5 - 35.7 g/dL Final    RDW 07/10/2024 12.1 (L)  12.3 - 15.4 % Final    RDW-SD 07/10/2024 42.9  37.0 - 54.0 fl Final    MPV 07/10/2024 10.3  6.0 - 12.0 fL Final    Platelets 07/10/2024 330  140 - 450 10*3/mm3 Final    Neutrophil % 07/10/2024 51.6  42.7 - 76.0 % Final    Lymphocyte % 07/10/2024 38.9  19.6 - 45.3 % Final    Monocyte % 07/10/2024 7.4  5.0 - 12.0 % Final    Eosinophil % 07/10/2024 1.1  0.3 - 6.2 % Final    Basophil % 07/10/2024 0.8  0.0 - 1.5 % Final    Immature Grans % 07/10/2024  0.2  0.0 - 0.5 % Final    Neutrophils, Absolute 07/10/2024 3.37  1.70 - 7.00 10*3/mm3 Final    Lymphocytes, Absolute 07/10/2024 2.53  0.70 - 3.10 10*3/mm3 Final    Monocytes, Absolute 07/10/2024 0.48  0.10 - 0.90 10*3/mm3 Final    Eosinophils, Absolute 07/10/2024 0.07  0.00 - 0.40 10*3/mm3 Final    Basophils, Absolute 07/10/2024 0.05  0.00 - 0.20 10*3/mm3 Final    Immature Grans, Absolute 07/10/2024 0.01  0.00 - 0.05 10*3/mm3 Final    nRBC 07/10/2024 0.0  0.0 - 0.2 /100 WBC Final   Hospital Outpatient Visit on 07/09/2024   Component Date Value Ref Range Status    EF(MOD-bp) 07/09/2024 60.2  % Final    LVIDd 07/09/2024 3.2  cm Final    LVIDs 07/09/2024 2.16  cm Final    IVSd 07/09/2024 0.68  cm Final    LVPWd 07/09/2024 0.91  cm Final    FS 07/09/2024 32.9  % Final    IVS/LVPW 07/09/2024 0.74  cm Final    ESV(cubed) 07/09/2024 10.1  ml Final    EDV(cubed) 07/09/2024 33.4  ml Final    LV mass(C)d 07/09/2024 65.0  grams Final    LVOT area 07/09/2024 2.27  cm2 Final    LVOT diam 07/09/2024 1.70  cm Final    EDV(MOD-sp2) 07/09/2024 48.1  ml Final    EDV(MOD-sp4) 07/09/2024 46.1  ml Final    ESV(MOD-sp2) 07/09/2024 19.1  ml Final    ESV(MOD-sp4) 07/09/2024 17.0  ml Final    SV(MOD-sp2) 07/09/2024 29.0  ml Final    SV(MOD-sp4) 07/09/2024 29.1  ml Final    EF(MOD-sp2) 07/09/2024 60.3  % Final    EF(MOD-sp4) 07/09/2024 63.1  % Final    MV E max chacorta 07/09/2024 92.4  cm/sec Final    MV A max chacorta 07/09/2024 101.0  cm/sec Final    MV dec time 07/09/2024 0.32  sec Final    MV E/A 07/09/2024 0.91   Final    Pulm A Revs Dur 07/09/2024 0.12  sec Final    LA ESV Index (BP) 07/09/2024 12.0  ml/m2 Final    Med Peak E' Chacorta 07/09/2024 9.6  cm/sec Final    Lat Peak E' Chacorta 07/09/2024 11.8  cm/sec Final    TR max chacorta 07/09/2024 289.0  cm/sec Final    Avg E/e' ratio 07/09/2024 8.64   Final    SV(LVOT) 07/09/2024 57.4  ml Final    SV(RVOT) 07/09/2024 30.2  ml Final    Qp/Qs 07/09/2024 0.53   Final    RVIDd 07/09/2024 2.08  cm Final    TAPSE  (>1.6) 07/09/2024 1.77  cm Final    RV S' 07/09/2024 15.4  cm/sec Final    LA dimension (2D)  07/09/2024 2.30  cm Final    Pulm Sys Chacorta 07/09/2024 91.9  cm/sec Final    Pulm Gannon Chacorta 07/09/2024 60.7  cm/sec Final    Pulm S/D 07/09/2024 1.51   Final    Pulm A Revs Chacorta 07/09/2024 27.9  cm/sec Final    LV V1 max 07/09/2024 138.0  cm/sec Final    LV V1 max PG 07/09/2024 7.6  mmHg Final    LV V1 mean PG 07/09/2024 2.00  mmHg Final    LV V1 VTI 07/09/2024 25.3  cm Final    Ao pk chacorta 07/09/2024 144.0  cm/sec Final    Ao max PG 07/09/2024 8.3  mmHg Final    Ao mean PG 07/09/2024 4.0  mmHg Final    Ao V2 VTI 07/09/2024 30.2  cm Final    LYNN(I,D) 07/09/2024 1.90  cm2 Final    AI P1/2t 07/09/2024 611.7  msec Final    MV mean PG 07/09/2024 2.00  mmHg Final    MV V2 VTI 07/09/2024 31.9  cm Final    MV P1/2t 07/09/2024 96.0  msec Final    MVA(P1/2t) 07/09/2024 2.29  cm2 Final    MVA(VTI) 07/09/2024 1.80  cm2 Final    MV dec slope 07/09/2024 308.0  cm/sec2 Final    TR max PG 07/09/2024 33.4  mmHg Final    RVSP(TR) 07/09/2024 38.4  mmHg Final    RAP systole 07/09/2024 5.0  mmHg Final    RVOT diam 07/09/2024 1.60  cm Final    RV V1 max PG 07/09/2024 2.00  mmHg Final    RV V1 max 07/09/2024 70.7  cm/sec Final    RV V1 VTI 07/09/2024 15.0  cm Final    PA V2 max 07/09/2024 71.1  cm/sec Final    Ao root diam 07/09/2024 2.6  cm Final    ACS 07/09/2024 1.30  cm Final    IVRT 07/09/2024 67.0  ms Final    Dimensionless Index 07/09/2024 0.84  (DI) Final    Ascending aorta 07/09/2024 2.5  cm Final   Orders Only on 06/27/2024   Component Date Value Ref Range Status    AFB Culture 07/01/2024 No AFB isolated at 6 weeks   Final    AFB Stain 07/01/2024 No acid fast bacilli seen on direct smear   Final    AFB Stain 07/01/2024 No acid fast bacilli seen on concentrated smear   Final    AFB Culture 07/01/2024 Other (Organism type) (DO NOT USE)   Final    Unable to isolate sufficient AFB for identification due to gross contamination Performed by [place]  [facility]                   [address] [facility] [address] [facility], [address]                    [place] [address]     AFB Stain 07/01/2024 No acid fast bacilli seen on direct smear (C)   Final    AFB Stain 07/01/2024 No acid fast bacilli seen on concentrated smear (C)   Final    AFB Stain 07/01/2024 Acid fast bacilli seen on concentrated smear (C)   Final    AFB Culture 07/01/2024 No AFB isolated at 6 weeks   Final    AFB Stain 07/01/2024 No acid fast bacilli seen on direct smear   Final    AFB Stain 07/01/2024 No acid fast bacilli seen on concentrated smear   Final   Lab on 04/16/2024   Component Date Value Ref Range Status    Glucose 04/16/2024 92  65 - 99 mg/dL Final    BUN 04/16/2024 18  8 - 23 mg/dL Final    Creatinine 04/16/2024 0.90  0.57 - 1.00 mg/dL Final    Sodium 04/16/2024 145  136 - 145 mmol/L Final    Potassium 04/16/2024 4.0  3.5 - 5.2 mmol/L Final    Chloride 04/16/2024 102  98 - 107 mmol/L Final    CO2 04/16/2024 29.3 (H)  22.0 - 29.0 mmol/L Final    Calcium 04/16/2024 10.0  8.6 - 10.5 mg/dL Final    Total Protein 04/16/2024 7.5  6.0 - 8.5 g/dL Final    Albumin 04/16/2024 4.2  3.5 - 5.2 g/dL Final    ALT (SGPT) 04/16/2024 17  1 - 33 U/L Final    AST (SGOT) 04/16/2024 34 (H)  1 - 32 U/L Final    Alkaline Phosphatase 04/16/2024 90  39 - 117 U/L Final    Total Bilirubin 04/16/2024 0.3  0.0 - 1.2 mg/dL Final    Globulin 04/16/2024 3.3  gm/dL Final    A/G Ratio 04/16/2024 1.3  g/dL Final    BUN/Creatinine Ratio 04/16/2024 20.0  7.0 - 25.0 Final    Anion Gap 04/16/2024 13.7  5.0 - 15.0 mmol/L Final    eGFR 04/16/2024 67.2  >60.0 mL/min/1.73 Final    Total Cholesterol 04/16/2024 210 (H)  0 - 200 mg/dL Final    Triglycerides 04/16/2024 102  0 - 150 mg/dL Final    HDL Cholesterol 04/16/2024 71 (H)  40 - 60 mg/dL Final    LDL Cholesterol  04/16/2024 121 (H)  0 - 100 mg/dL Final    VLDL Cholesterol 04/16/2024 18  5 - 40 mg/dL Final    LDL/HDL Ratio 04/16/2024 1.67   Final    Vitamin B-12 04/16/2024 733  211 -  946 pg/mL Final    Folate 04/16/2024 >20.00  4.78 - 24.20 ng/mL Final    Sjogren's Anti-SS-A 04/16/2024 <0.2  0.0 - 0.9 AI Final    Sjogren's Anti-SS-B 04/16/2024 <0.2  0.0 - 0.9 AI Final    TSH 04/16/2024 1.890  0.270 - 4.200 uIU/mL Final    Free T4 04/16/2024 1.20  0.93 - 1.70 ng/dL Final    T4 results may be falsely increased if patient taking Biotin.    Phosphorus 04/16/2024 3.8  2.5 - 4.5 mg/dL Final   Admission on 02/15/2024, Discharged on 02/15/2024   Component Date Value Ref Range Status    QT Interval 02/15/2024 389  ms Final    QTC Interval 02/15/2024 458  ms Final    Glucose 02/15/2024 115 (H)  65 - 99 mg/dL Final    BUN 02/15/2024 11  8 - 23 mg/dL Final    Creatinine 02/15/2024 0.84  0.57 - 1.00 mg/dL Final    Sodium 02/15/2024 138  136 - 145 mmol/L Final    Potassium 02/15/2024 3.9  3.5 - 5.2 mmol/L Final    Chloride 02/15/2024 98  98 - 107 mmol/L Final    CO2 02/15/2024 24.7  22.0 - 29.0 mmol/L Final    Calcium 02/15/2024 10.0  8.6 - 10.5 mg/dL Final    Total Protein 02/15/2024 8.1  6.0 - 8.5 g/dL Final    Albumin 02/15/2024 4.5  3.5 - 5.2 g/dL Final    ALT (SGPT) 02/15/2024 14  1 - 33 U/L Final    AST (SGOT) 02/15/2024 27  1 - 32 U/L Final    Alkaline Phosphatase 02/15/2024 88  39 - 117 U/L Final    Total Bilirubin 02/15/2024 0.4  0.0 - 1.2 mg/dL Final    Globulin 02/15/2024 3.6  gm/dL Final    A/G Ratio 02/15/2024 1.3  g/dL Final    BUN/Creatinine Ratio 02/15/2024 13.1  7.0 - 25.0 Final    Anion Gap 02/15/2024 15.3 (H)  5.0 - 15.0 mmol/L Final    eGFR 02/15/2024 73.0  >60.0 mL/min/1.73 Final    Acetaminophen 02/15/2024 <5.0  0.0 - 30.0 mcg/mL Final    Ethanol 02/15/2024 <10  0 - 10 mg/dL Final    Ethanol % 02/15/2024 <0.010  % Final    Salicylate 02/15/2024 <0.3  <=30.0 mg/dL Final    Amphet/Methamphet, Screen 02/15/2024 Negative  Negative Final    Barbiturates Screen, Urine 02/15/2024 Negative  Negative Final    Benzodiazepine Screen, Urine 02/15/2024 Negative  Negative Final    Cocaine Screen,  Urine 02/15/2024 Negative  Negative Final    Opiate Screen 02/15/2024 Negative  Negative Final    THC, Screen, Urine 02/15/2024 Negative  Negative Final    Methadone Screen, Urine 02/15/2024 Negative  Negative Final    Oxycodone Screen, Urine 02/15/2024 Negative  Negative Final    Fentanyl, Urine 02/15/2024 Negative  Negative Final    HS Troponin T 02/15/2024 15 (H)  <14 ng/L Final    Extra Tube 02/15/2024 Hold for add-ons.   Final    Auto resulted.    Extra Tube 02/15/2024 hold for add-on   Final    Auto resulted    Extra Tube 02/15/2024 Hold for add-ons.   Final    Auto resulted.    Extra Tube 02/15/2024 Hold for add-ons.   Final    Auto resulted    WBC 02/15/2024 7.09  3.40 - 10.80 10*3/mm3 Final    RBC 02/15/2024 4.72  3.77 - 5.28 10*6/mm3 Final    Hemoglobin 02/15/2024 14.6  12.0 - 15.9 g/dL Final    Hematocrit 02/15/2024 44.1  34.0 - 46.6 % Final    MCV 02/15/2024 93.4  79.0 - 97.0 fL Final    MCH 02/15/2024 30.9  26.6 - 33.0 pg Final    MCHC 02/15/2024 33.1  31.5 - 35.7 g/dL Final    RDW 02/15/2024 14.0  12.3 - 15.4 % Final    RDW-SD 02/15/2024 48.8  37.0 - 54.0 fl Final    MPV 02/15/2024 10.0  6.0 - 12.0 fL Final    Platelets 02/15/2024 215  140 - 450 10*3/mm3 Final    Neutrophil % 02/15/2024 62.1  42.7 - 76.0 % Final    Lymphocyte % 02/15/2024 30.6  19.6 - 45.3 % Final    Monocyte % 02/15/2024 6.5  5.0 - 12.0 % Final    Eosinophil % 02/15/2024 0.1 (L)  0.3 - 6.2 % Final    Basophil % 02/15/2024 0.6  0.0 - 1.5 % Final    Immature Grans % 02/15/2024 0.1  0.0 - 0.5 % Final    Neutrophils, Absolute 02/15/2024 4.40  1.70 - 7.00 10*3/mm3 Final    Lymphocytes, Absolute 02/15/2024 2.17  0.70 - 3.10 10*3/mm3 Final    Monocytes, Absolute 02/15/2024 0.46  0.10 - 0.90 10*3/mm3 Final    Eosinophils, Absolute 02/15/2024 0.01  0.00 - 0.40 10*3/mm3 Final    Basophils, Absolute 02/15/2024 0.04  0.00 - 0.20 10*3/mm3 Final    Immature Grans, Absolute 02/15/2024 0.01  0.00 - 0.05 10*3/mm3 Final    nRBC 02/15/2024 0.0  0.0 -  0.2 /100 WBC Final    Color, UA 02/15/2024 Yellow  Yellow, Straw Final    Appearance, UA 02/15/2024 Clear  Clear Final    pH, UA 02/15/2024 7.0  5.0 - 8.0 Final    Specific Gravity, UA 02/15/2024 <=1.005  1.005 - 1.030 Final    Glucose, UA 02/15/2024 Negative  Negative Final    Ketones, UA 02/15/2024 Trace (A)  Negative Final    Bilirubin, UA 02/15/2024 Negative  Negative Final    Blood, UA 02/15/2024 Negative  Negative Final    Protein, UA 02/15/2024 Negative  Negative Final    Leuk Esterase, UA 02/15/2024 Negative  Negative Final    Nitrite, UA 02/15/2024 Negative  Negative Final    Urobilinogen, UA 02/15/2024 0.2 E.U./dL  0.2 - 1.0 E.U./dL Final   There may be more visits with results that are not included.       EKG Results:  No orders to display       Imaging Results:  No Images in the past 120 days found..      Assessment & Plan   Diagnoses and all orders for this visit:    1. 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  Dispense: 90 tablet; Refill: 1  -     traZODone (DESYREL) 100 MG tablet; Take 2 tablets by mouth Every Night.  Dispense: 60 tablet; Refill: 5  -     mirtazapine (REMERON) 15 MG tablet; Take 1 tablet by mouth every night at bedtime.  Dispense: 90 tablet; Refill: 3    2. Mixed bipolar II disorder  -     QUEtiapine (SEROquel) 100 MG tablet; Take 1 tablet by mouth Every Night.  Dispense: 90 tablet; Refill: 1  -     mirtazapine (REMERON) 15 MG tablet; Take 1 tablet by mouth every night at bedtime.  Dispense: 90 tablet; Refill: 3    3. Generalized anxiety disorder  -     QUEtiapine (SEROquel) 100 MG tablet; Take 1 tablet by mouth Every Night.  Dispense: 90 tablet; Refill: 1  -     mirtazapine (REMERON) 15 MG tablet; Take 1 tablet by mouth every night at bedtime.  Dispense: 90 tablet; Refill: 3    4. Panic attacks  -     QUEtiapine (SEROquel) 100 MG tablet; Take 1 tablet by mouth Every Night.  Dispense: 90 tablet; Refill:  1  -     LORazepam (Ativan) 0.5 MG tablet; Take 1 to 2 tablets at bedtime as needed for sleep.  Dispense: 60 tablet; Refill: 5        01/16/2025: Stop olanzapine and increase seroquel.    Acknowledged and normalized patient's thoughts, feelings, and concerns. Allowed patient to freely discuss and process issues, such as:  Anxiety and depression regarding family's well-being,   Anxiety regarding insomnia  ... using Rogerian psychotherapeutic techniques including unconditional positive regard, reflective listening, and demonstrating clear empathy, with the goal of ameliorating symptoms and maintaining, restoring, or improving self-esteem, adaptive skills, and ego or psychological functions (Samra et al. 1991), the long-term goal of which is to develop a better, healthier perspective and help the patient bear their circumstances more easily.  Time (minutes) spent providing supportive psychotherapy: 16  (This time is exclusive to the therapy session and separate from the time spent on activities used to meet the criteria for the E/M service (history, exam, medical decision-making).)  Start: 2:32  Stop: 2:48  Functional status: mild impairment  Treatment plan: Medication management and supportive psychotherapy  Prognosis: good  Progress: insomnia, bipolar mixed  6w    11/26/2024: Minimal change. On rifampin which reduces the concentration of olanzapine and trazodone. Increase for insomnia.    10/3/2024: Not seen in over a year. Rifampin for presumable CLAY decreases the concentration of both trazodone and olanzapine. Sleeping worse the last 3 mos; was doing better -- but not perfect -- before then. Mixed bipolar, insomnia. Increase olanzapine and trazodone temporarily. Close follow up.      7/31/23: Patient continues to make changes to meds on her own (reduced klonopin to 0.5 mg nightly), but she is finally sleeping. No changes for now.    6/13: Start gabapentin with traz and klonopin for insomnia. Close follow  up.     2/14: Discussion that resolved our conflict.  Patient is still not sleeping, and has persistent anxiety.  We discussed the connection between the 2.  We will target insomnia.  Start by switching to Belsomra.  The plan is to try Belsomra plus clonazepam (we cannot discontinue clonazepam quickly as she has been on it chronically and it is a benzodiazepine) to see if she can sleep on this medication regimen.  Stop trazodone at this time.  Likely will resume and later as the patient feels the trazodone helps with anxiety.     10/6: no changes. Now off seroquel and sleeping fairly well.     9/16: Continue downward titration of Seroquel and upward titration of trazodone.  Having some initial insomnia, but this is actually to be expected.  Also expect this to resolve in time.      9/2: Pt wants to get off seroquel for health reasons. Titrate off of it slowly.  Patient and I had a long conversation about not changing her medications on her own without telling me.  She voiced understanding and agreed to proceed.      7/22: Patient agreed to try Prozac.  Will keep us posted.     6/24: Get EKG now. If qtc reassuring will start low dose prozac and recheck. Pt is fearful of the combo of seroquel and prozac affecting her heart.     3/24: Add trazodone.     2/25: Urged patient to set up sleep study. Stop abilify, hydroxyzine (never started). Stop clonazepam. Continue seroquel 150 mg daily with miralax every few days (constipation), and start ambien.     2/14: Transition from Seroquel to Abilify.  10 mg of Abilify is equal to 100 mg of Seroquel.  To help patient with sleep, start hydroxyzine in the evenings.  Also increase melatonin.     11/15: Increase melatonin and seroquel.     10/29: Seroquel 150 xr helped, but led to severe depression. Switch to Latuda. 20 minutes of supportive psychotherapy 3 wks.    9/16: Patient has significant depression.  Insomnia is fairly controlled on multiple medications.  Mood and insomnia  were better when we started Seroquel at the beginning of the year. Patient advised to back down on Klonopin to 0.5 mg nightly.  I will switch the Seroquel formulation to extended release in order to allow me to go up on the dose of the Seroquel to target bipolar hypomania.  Caution advised to the patient regarding sedation, dizziness, falls.  4 weeks.    8/18: now sleeping on klonopin 0.5 mg qhs, seroquel 75 mg qhs, melatonin 3 mg qhs. Continue.  Patient advised that if the above regimen does not work, to add an additional 25 mg of Seroquel after 1 hour, and only to add the Ambien if she is unable to sleep after 1 hour from taking that extra dose of Seroquel.  4 weeks.    8/9: Continued insomnia.  Chronic.  Order sleep study.  Increase Seroquel and start Colace to target constipation.  See back in 4 weeks.  Patient will also start melatonin 3 mg nightly over-the-counter.  No bipolar domingo or hypomania present today; however, it is possible that bipolar could be the reason for her insomnia.  Patient's scores indicate depression and anxiety, both of which will be treated with a higher dose of Seroquel.  Consider switching to Seroquel extended release, which the patient may tolerate better.    6/8: Not sleeping again. Restart ambien. Continue Seroquel.  Continue Klonopin. Consider switching to vraylar. See back in 2 months. Status post mirtazapine, trazodone, and she never tried olanzapine.  She may discontinue Ambien for good.  Psychotherapy is deferred at this time.      Visit Diagnoses:    ICD-10-CM ICD-9-CM   1. Insomnia due to mental condition  F51.05 300.9     327.02   2. Mixed bipolar II disorder  F31.81 296.89   3. Generalized anxiety disorder  F41.1 300.02   4. Panic attacks  F41.0 300.01       PLAN:  Safety: no acute safety concerns.  Risk Assessment: Risk Assessment: Risk of self-harm acutely is low. Risk factors include mood disorder, access to weapons, recent psychosocial stressors (pandemic), family  history. Protective factors include no present SI, no history of suicide attempts or self-harm in the past, no AODA, healthcare seeking, future orientation, willingness to engage in care, [employer] belief system. Risk of self-harm chronically is also low, but could be further elevated in the event of treatment noncompliance and/or AODA.  Safety: No acute safety concerns.  Medications:   CONTINUE ativan 0.5 mg 1-2 QHS. Risks, benefits, alternatives discussed with patient including GI upset, sedation, dizziness, falls risk. After discussion of these risks and benefits, the patient voiced understanding and agreed to proceed. Vern ordered. UDS ordered. Controlled substances agreement verbally signed.   STOP olanzapine 5 mg qhs. Not effective 1/2025.  INCREASE seroquel 50 to 100 mg qhs. Risks, benefits, alternatives discussed with patient including nausea and vomiting, GI upset, sedation, dizziness, falls, akathisia, hypotension, increased appetite, lowering of seizure threshold, theoretical risk of tardive dyskinesia, extrapyramidal symptoms, movement issues, restless legs syndrome. Use care when operating vehicle, vessel, or machine. After discussion of these risks and benefits, the patient voiced understanding and agreed to proceed.  CONTINUE trazodone 200 mg nightly. Risks, benefits, side effects discussed with patient including GI upset, sedation, dizziness/falls risk, grogginess the following day, prolongation of the QTc interval.  After discussion of these risks and benefits, the patient voiced understanding and agreed to proceed.    CONTINUE mirtazapine 15 mg qhs. Risks, benefits, alternatives discussed with patient including GI upset, sedation, dizziness with falls risk, increased appetite.  Do not use before operating vehicle, vessel, or machine. After discussion of these risks and benefits, the patient voiced understanding and agreed to proceed.  S/P   quetiapine 50 mg qhs PRN insomnia. 10/24  gabapentin 600  mg qhs. 10/24  Klonopin 0.5 mg PO QHS PRN anxiety.   seroquel 100 mg nightly. Constip, higher cholesterol  NEVER STARTED belsomra 5 mg nightly.  latuda 40 mg PO QDAY (samples given). No effect  Mirtazapine ineffective for insomnia.  Trazodone ineffective for insomnia (constipation at 100 mg nightly).  ambien ER 12.5 qhs. Somewhat helpful for insomnia in the past.  Doxepin caused constipation.  ambien 10 mg nightly. No particular reason.  Stopped melatonin 15 mg nightly. No reason.  NEVER STARTED prozac 10 mg daily after reassuring EKG (which we have).  Therapy: Deferred.      TREATMENT PLAN/GOALS: Continue supportive psychotherapy efforts and medications as indicated. Treatment and medication options discussed during today's visit. Patient ackowledged and verbally consented to continue with current treatment plan and was educated on the importance of compliance with treatment and follow-up appointments.    MEDICATION ISSUES:  PATRICIA reviewed as expected.  Discussed medication options and treatment plan of prescribed medication as well as the risks, benefits, and side effects including potential falls, possible impaired driving and metabolic adversities among others. Patient is agreeable to call the office with any worsening of symptoms or onset of side effects. Patient is agreeable to call 911 or go to the nearest ER should he/she begin having SI/HI. No medication side effects or related complaints today.     MEDS ORDERED DURING VISIT:  New Medications Ordered This Visit   Medications    QUEtiapine (SEROquel) 100 MG tablet     Sig: Take 1 tablet by mouth Every Night.     Dispense:  90 tablet     Refill:  1     Replaces olanzapine. Thank you for the help. Please call with questions: [phone].    LORazepam (Ativan) 0.5 MG tablet     Sig: Take 1 to 2 tablets at bedtime as needed for sleep.     Dispense:  60 tablet     Refill:  5    traZODone (DESYREL) 100 MG tablet     Sig: Take 2 tablets by mouth Every Night.      Dispense:  60 tablet     Refill:  5     Corrected quantity. Thank you for the help. Please call with questions: [phone].    mirtazapine (REMERON) 15 MG tablet     Sig: Take 1 tablet by mouth every night at bedtime.     Dispense:  90 tablet     Refill:  3     Refills. Thank you for the help. Please call with questions: [phone].       Return in about 6 weeks (around 2/27/2025).         This document has been electronically signed by Susie Moran MD  January 16, 2025 14:49 EST      Part of this note may be an electronic transcription/translation of spoken language to printed text using the Dragon Dictation System.

## 2025-01-20 ENCOUNTER — TELEPHONE (OUTPATIENT)
Dept: INTERNAL MEDICINE | Age: 75
End: 2025-01-20
Payer: MEDICARE

## 2025-01-20 ENCOUNTER — TELEPHONE (OUTPATIENT)
Dept: PSYCHIATRY | Facility: CLINIC | Age: 75
End: 2025-01-20
Payer: MEDICARE

## 2025-01-20 ENCOUNTER — TELEPHONE (OUTPATIENT)
Dept: PULMONOLOGY | Facility: CLINIC | Age: 75
End: 2025-01-20
Payer: MEDICARE

## 2025-01-20 RX ORDER — METOPROLOL SUCCINATE 25 MG/1
TABLET, EXTENDED RELEASE ORAL
Qty: 60 TABLET | Refills: 1 | Status: SHIPPED | OUTPATIENT
Start: 2025-01-20

## 2025-01-20 NOTE — TELEPHONE ENCOUNTER
PT PHONED IN THE ON CALL SERVICE.    SHE THEN CALLED BACK INTO THE OFFICE.    PT REPORTS THAT SHE'S STILL NOT SLEEPING. SHE'S BEEN UP ALL NIGHT.    SHE FEELS LIKE SHE'S JUMPING OUT OF HER SKIN.    PT STATED SHE CAN'T KEEP GOING ON THIS WAY; SOMETHING IS GOING TO HAVE TO BE DONE FOR HER.    PT STATED THAT NO MATTER WHAT HE GIVES ME I STILL CAN'T SLEEP.    PT REPORTED THAT SHE TOOK ALL MEDICATIONS AS PRESCRIBED.

## 2025-01-20 NOTE — TELEPHONE ENCOUNTER
At one time she was on Metoprolol 25 mgs 1/2 bid. B/P earlier today was 155/92. Scheduled an appt to see you tomorrow. Has insomnia really bad and feels that this is the cause of her elevated B/P. Should she take anything before her appt tomorrow?

## 2025-01-20 NOTE — TELEPHONE ENCOUNTER
I sent prescription over for as needed medication, she could pick it up anytime and have it available tonight if needed.

## 2025-01-20 NOTE — TELEPHONE ENCOUNTER
Spoke with the patient and informed them to stop the medication if they feel that aren't tolerating the medication and we will discuss other options on the 29th at their next appointment.

## 2025-01-20 NOTE — TELEPHONE ENCOUNTER
I CALLED AND SPOKE TO PT AND RELAYED OF PROVIDERS MESSAGE VERBATIM.,    PT EXPRESSED UNDERSTANDING.

## 2025-01-20 NOTE — TELEPHONE ENCOUNTER
Patient called and stated that she is on the triple treatment for MAC. She is having a hard time sleeping with it. Please call patient

## 2025-01-20 NOTE — TELEPHONE ENCOUNTER
Increase seroquel to 2 tabs nightly; change nothing else. Give it till the end of the week and call us then with an update.

## 2025-01-21 ENCOUNTER — TELEPHONE (OUTPATIENT)
Dept: BEHAVIORAL HEALTH | Facility: CLINIC | Age: 75
End: 2025-01-21
Payer: MEDICARE

## 2025-01-21 NOTE — TELEPHONE ENCOUNTER
"PT PHONED IN.    PT REPORTS THAT PROVIDER SALUD HAS OVERDOSED HER ON MEDICATION AND SHE IS NOW HAVING AN ADVERSE REACTION.    PT REPORTS HEART PALPITATIONS AND WAS REALLY, REALLY SICK; FELT LIKE SHE WAS GOING TO JUMP OUT OF HER SKIN; NAUSEOUS.     PT REPORTS THAT SHE STILL ISN'T SLEEPING. PT REPORTS THAT SHE DIDN'T SLEEP ANY LAST NIGHT AT ALL.    THAT AFTER GETTING SICK SHE CONTACTED HER DAUGHTER IN THE ER AND WENT OVER HER MEDICATIONS WITH HER DAUGHTER.  HER DAUGHTER TOLD HER THAT IT WAS WAY TO MUCH MEDICATION FOR HER TO BE TAKING AND THAT ALL OF IT TOGETHER SHOULD HAVE KNOCKED HER OUT ASLEEP.    I ASKED PT IF SHE HAD EVER HAD A SLEEP STUDY DONE AND SHE SAID \"NO I HAVEN'T BUT A SLEEP STUDY ISN'T GOING TO HELP IN MY CASE; I'VE GOT REALLY BAD ANXIETY AND SLEEP PROBLEMS.\"  "

## 2025-01-21 NOTE — TELEPHONE ENCOUNTER
Called pt back. 200 mg seroquel was too much, and she experienced side effects, ie, nausea, palpitations. Due to these, she couldn't sleep. Took her other medications as well.    Discussed at length. Pt and I agreed to increase seroquel by 25 mg tonight. She also agrees to update us on Friday. Continue other medications. No SI HI AVH. All questions answered. Pt also asked about using cannabis gummies.

## 2025-01-28 ENCOUNTER — OFFICE VISIT (OUTPATIENT)
Dept: INTERNAL MEDICINE | Age: 75
End: 2025-01-28
Payer: MEDICARE

## 2025-01-28 VITALS
SYSTOLIC BLOOD PRESSURE: 138 MMHG | BODY MASS INDEX: 16.62 KG/M2 | HEART RATE: 80 BPM | DIASTOLIC BLOOD PRESSURE: 76 MMHG | OXYGEN SATURATION: 96 % | TEMPERATURE: 97.8 F | WEIGHT: 88 LBS | HEIGHT: 61 IN

## 2025-01-28 DIAGNOSIS — D50.9 IRON DEFICIENCY ANEMIA, UNSPECIFIED IRON DEFICIENCY ANEMIA TYPE: ICD-10-CM

## 2025-01-28 DIAGNOSIS — I10 PRIMARY HYPERTENSION: Primary | ICD-10-CM

## 2025-01-28 DIAGNOSIS — R13.12 OROPHARYNGEAL DYSPHAGIA: ICD-10-CM

## 2025-01-28 DIAGNOSIS — R63.4 WEIGHT LOSS: ICD-10-CM

## 2025-01-28 DIAGNOSIS — F33.1 MODERATE EPISODE OF RECURRENT MAJOR DEPRESSIVE DISORDER: ICD-10-CM

## 2025-01-28 PROCEDURE — 3078F DIAST BP <80 MM HG: CPT | Performed by: INTERNAL MEDICINE

## 2025-01-28 PROCEDURE — 3075F SYST BP GE 130 - 139MM HG: CPT | Performed by: INTERNAL MEDICINE

## 2025-01-28 PROCEDURE — G2211 COMPLEX E/M VISIT ADD ON: HCPCS | Performed by: INTERNAL MEDICINE

## 2025-01-28 PROCEDURE — 99214 OFFICE O/P EST MOD 30 MIN: CPT | Performed by: INTERNAL MEDICINE

## 2025-01-28 PROCEDURE — 1126F AMNT PAIN NOTED NONE PRSNT: CPT | Performed by: INTERNAL MEDICINE

## 2025-01-28 RX ORDER — FAMOTIDINE 40 MG/1
40 TABLET, FILM COATED ORAL NIGHTLY
Qty: 90 TABLET | Refills: 1 | Status: SHIPPED | OUTPATIENT
Start: 2025-01-28

## 2025-01-28 RX ORDER — METOPROLOL SUCCINATE 25 MG/1
12.5 TABLET, EXTENDED RELEASE ORAL 2 TIMES DAILY
Qty: 90 TABLET | Refills: 1 | Status: SHIPPED | OUTPATIENT
Start: 2025-01-28

## 2025-01-28 RX ORDER — PANTOPRAZOLE SODIUM 40 MG/1
40 TABLET, DELAYED RELEASE ORAL EVERY MORNING
Qty: 90 TABLET | Refills: 3 | Status: SHIPPED | OUTPATIENT
Start: 2025-01-28

## 2025-01-28 NOTE — ASSESSMENT & PLAN NOTE
She reviewed with me current meds as per Dr. Moran, taking the majority of them at night, still having some issues with sleep.  Discussed with her that metoprolol should be beneficial this regard.

## 2025-01-28 NOTE — PROGRESS NOTES
"Chief Complaint  Hypertension (Pt states that her BP was running high, she started took one of her Toprol's. She has a list of BP readings. )    Subjective      Manju Mendoza presents to [facility]    History of present illness:  Patient is a 74-year-old female with underlying COPD/emphysema/RAD, as well as mild hyperlipidemia, DJD, insomnia, among others, seen 6/23 as new patient, and who is coming in 12/24 for routine 3-month follow-up.  We will go over her med list in detail, review recent labs, address her care gaps, and make further recommendations at that time.---> Patient being seen early in 1/25 for urgent issue as per chief complaint above.    Review of Systems   Constitutional:  Negative for appetite change, fatigue and fever.   HENT:  Negative for congestion and ear pain.    Eyes:  Negative for blurred vision.   Respiratory:  Negative for cough, chest tightness, shortness of breath and wheezing.    Cardiovascular:  Negative for chest pain, palpitations and leg swelling.   Gastrointestinal:  Negative for abdominal pain.   Genitourinary:  Negative for difficulty urinating, dysuria and hematuria.   Musculoskeletal:  Negative for arthralgias and gait problem.   Skin:  Negative for skin lesions.   Neurological:  Negative for syncope, memory problem and confusion.   Psychiatric/Behavioral:  Negative for self-injury and depressed mood.        Objective   Vital Signs:   /76   Pulse 80   Temp 97.8 °F (36.6 °C) (Skin)   Ht 154.9 cm (60.98\")   Wt 39.9 kg (88 lb)   SpO2 96%   BMI 16.64 kg/m²           Physical Exam  Vitals and nursing note reviewed.   Constitutional:       General: She is not in acute distress.     Appearance: Normal appearance. She is not toxic-appearing.   HENT:      Head: Atraumatic.      Right Ear: External ear normal.      Left Ear: External ear normal.      Nose: Nose normal.      Mouth/Throat:      Mouth: Mucous membranes are moist.   Eyes: "      General:         Right eye: No discharge.         Left eye: No discharge.      Extraocular Movements: Extraocular movements intact.      Pupils: Pupils are equal, round, and reactive to light.   Cardiovascular:      Rate and Rhythm: Normal rate and regular rhythm.      Pulses: Normal pulses.      Heart sounds: Normal heart sounds. No murmur heard.     No gallop.   Pulmonary:      Effort: Pulmonary effort is normal. No respiratory distress.      Breath sounds: No wheezing, rhonchi or rales.   Abdominal:      General: There is no distension.      Palpations: Abdomen is soft. There is no mass.      Tenderness: There is no abdominal tenderness. There is no guarding.   Musculoskeletal:         General: No swelling or tenderness.      Cervical back: No tenderness.      Right lower leg: No edema.      Left lower leg: No edema.   Skin:     General: Skin is warm and dry.      Findings: No rash.   Neurological:      General: No focal deficit present.      Mental Status: She is alert and oriented to person, place, and time. Mental status is at baseline.      Motor: No weakness.      Gait: Gait normal.   Psychiatric:         Mood and Affect: Mood normal.         Thought Content: Thought content normal.          Result Review   The following data was reviewed by: Scar Cisneros MD on              Assessment and Plan   Diagnoses and all orders for this visit:    1. Primary hypertension (Primary)  Assessment & Plan:  This indication for her 1/25 OV, apparently has been getting elevated blood pressures at home.  Additionally, just one of the last 6 readings registered in her chart are not optimal.  Diastolic is fine, but systolic is in the 140-50 ballpark.    Reviewed patient's med list from home, she had 1 or 2 readings in the 190 ballpark, they may have been outliers.  However despite those on average she is in the 140-50 ballpark, and her diastolic at home is in the 86 ballpark frequently.    Pulse is 66-81 at  home.    Patient will get back on consistent dosing with Toprol-XL, she will take half of the low-dose 25 mg twice a day.  Prescription sent.            2. Weight loss  Overview:  My note 2/24:  Patient has lost 10 pounds over the past year, sounds like she may have lost 27 pounds over past several years. She has had issues with her stomach, and just last month had a EGD by Dr. Fournier with dilatation. She is going to be on Protonix for some time now as well as Pepcid to help with the gastritis and esophagitis. Discussed with patient she needs to expand her diet, fatty foods are fine for now, will worry about her cholesterol later.    Assessment & Plan:  Patient's weight is holding at 88 as of her 1/25 OV.  I think she ran out of Megace, looks like she is maintaining things with Remeron, continue same and follow-up as scheduled in 4/25.      3. Iron deficiency anemia, unspecified iron deficiency anemia type  -     Iron Profile; Future  -     Ferritin; Future    4. Moderate episode of recurrent major depressive disorder  Assessment & Plan:  She reviewed with me current meds as per Dr. Moran, taking the majority of them at night, still having some issues with sleep.  Discussed with her that metoprolol should be beneficial this regard.      5. Oropharyngeal dysphagia  Overview:  EGD 1/24:  - The middle third of the esophagus was normal. Biopsies were taken with a cold forceps for histology. Findings: - Non-severe esophagitis was found at the gastroesophageal junction. Biopsies were taken with a cold forceps for histology. - One benign-appearing, intrinsic mild stenosis was found at the gastroesophageal junction. A TTS dilator was passed through the scope. Dilation with a 15-16.5-18 mm balloon dilator was performed to 18 mm. Biopsies were taken with a cold forceps for histology. - Diffuse inflammation characterized by erythema and granularity was found in the gastric antrum. Biopsies were taken with a cold forceps for  histology. - The first portion of the duodenum and second portion of the duodenum were normal.        Blossom Garces MA          Please advise it looks like Dr Larson order this  Me    Yes, let her know that since Dr. Larson ordered this, he did not come to us directly, to that is why she has not heard anything from this office.     I reviewed the study, there was no aspiration, so no immediate concerns for complications from this.  Below is the report from the speech therapist.  Please place orders for speech therapy to evaluate her further as an outpatient to help with compensatory strategies.  Please asked her to follow recommendations 1 through 3 as listed below.  Thanks.     IMPRESSIONS:   Ms. Mendoza demonstrated oropharyngeal dysphagia characterized by swallow delay, decreased opening of the cricopharyngeus with minimal esophageal backflow of solid.  No aspiration was observed during the study.      FUNCTIONAL DEFICIT:  Patient scored level 6 of 7 on Functional Communication Measures for swallowing indicating a 1-19% limitation in function for current status, goal status, and discharge status.     RECOMMENDATIONS:   1.  Diet with regular solids cut small additional moisture, thin liquid.  2.  Positioning fully upright for all p.o. intake and 30 minutes following.  3.  Alternate small bites and small sips of solids and liquids at a slow rate.  Double swallow each bite/sip.  4.  Patient may benefit from follow-up consult with speech pathology services.        Blossom Garces MA    Spoke with patient rely message, patient decline speech therapist referral.             Assessment & Plan:  Patient still having some issues with this as of her 1/25 OV.  She is on pantoprazole moderate dose in the morning, discussed with her she can resume famotidine in the evening.      Other orders  -     metoprolol succinate XL (Toprol XL) 25 MG 24 hr tablet; Take 0.5 tablets by mouth 2 (Two) Times a Day.  Dispense: 90  tablet; Refill: 1  -     pantoprazole (PROTONIX) 40 MG EC tablet; Take 1 tablet by mouth Every Morning.  Dispense: 90 tablet; Refill: 3  -     famotidine (Pepcid) 40 MG tablet; Take 1 tablet by mouth Every Night.  Dispense: 90 tablet; Refill: 1                        Follow Up   Return for Next scheduled follow up.  Patient was given instructions and counseling regarding her condition or for health maintenance advice. Please see specific information pulled into the AVS if appropriate.     Total Time Spent:   minutes     This time includes time spent by me in the following activities: preparing for the visit, reviewing extensive past medical history and tests, performing a medically appropriate examination and/or evaluation, counseling and educating the patient and/or caregivers, ordering medications, tests, or procedures, referring and/or communicating with other health care professionals and documenting information in the medical record all on this date of service.

## 2025-01-28 NOTE — ASSESSMENT & PLAN NOTE
This indication for her 1/25 OV, apparently has been getting elevated blood pressures at home.  Additionally, just one of the last 6 readings registered in her chart are not optimal.  Diastolic is fine, but systolic is in the 140-50 ballpark.    Reviewed patient's med list from home, she had 1 or 2 readings in the 190 ballpark, they may have been outliers.  However despite those on average she is in the 140-50 ballpark, and her diastolic at home is in the 86 ballpark frequently.    Pulse is 66-81 at home.    Patient will get back on consistent dosing with Toprol-XL, she will take half of the low-dose 25 mg twice a day.  Prescription sent.

## 2025-01-28 NOTE — ASSESSMENT & PLAN NOTE
Patient's weight is holding at 88 as of her 1/25 OV.  I think she ran out of Megace, looks like she is maintaining things with Remeron, continue same and follow-up as scheduled in 4/25.

## 2025-01-28 NOTE — ASSESSMENT & PLAN NOTE
Patient still having some issues with this as of her 1/25 OV.  She is on pantoprazole moderate dose in the morning, discussed with her she can resume famotidine in the evening.

## 2025-01-29 ENCOUNTER — OFFICE VISIT (OUTPATIENT)
Dept: PULMONOLOGY | Facility: CLINIC | Age: 75
End: 2025-01-29
Payer: MEDICARE

## 2025-01-29 VITALS
RESPIRATION RATE: 18 BRPM | DIASTOLIC BLOOD PRESSURE: 74 MMHG | BODY MASS INDEX: 16.62 KG/M2 | TEMPERATURE: 98.2 F | WEIGHT: 88 LBS | HEART RATE: 64 BPM | HEIGHT: 61 IN | OXYGEN SATURATION: 95 % | SYSTOLIC BLOOD PRESSURE: 151 MMHG

## 2025-01-29 DIAGNOSIS — A31.0 MYCOBACTERIUM AVIUM COMPLEX: Primary | ICD-10-CM

## 2025-01-29 DIAGNOSIS — F17.201 TOBACCO ABUSE, IN REMISSION: ICD-10-CM

## 2025-01-29 DIAGNOSIS — Z79.899 HIGH RISK MEDICATION USE: ICD-10-CM

## 2025-01-29 DIAGNOSIS — J43.1 PANLOBULAR EMPHYSEMA: ICD-10-CM

## 2025-01-29 PROCEDURE — 1159F MED LIST DOCD IN RCRD: CPT | Performed by: STUDENT IN AN ORGANIZED HEALTH CARE EDUCATION/TRAINING PROGRAM

## 2025-01-29 PROCEDURE — 3077F SYST BP >= 140 MM HG: CPT | Performed by: STUDENT IN AN ORGANIZED HEALTH CARE EDUCATION/TRAINING PROGRAM

## 2025-01-29 PROCEDURE — 99214 OFFICE O/P EST MOD 30 MIN: CPT | Performed by: STUDENT IN AN ORGANIZED HEALTH CARE EDUCATION/TRAINING PROGRAM

## 2025-01-29 PROCEDURE — 3078F DIAST BP <80 MM HG: CPT | Performed by: STUDENT IN AN ORGANIZED HEALTH CARE EDUCATION/TRAINING PROGRAM

## 2025-01-29 PROCEDURE — 1160F RVW MEDS BY RX/DR IN RCRD: CPT | Performed by: STUDENT IN AN ORGANIZED HEALTH CARE EDUCATION/TRAINING PROGRAM

## 2025-01-29 NOTE — PROGRESS NOTES
Primary Care Provider  Scar Cisneros MD   Referring Provider  No ref. provider found      Patient Complaint  Follow-up (4 Months) and Mycobacterium avium complex      Subjective          Manju Mendoza presents to [facility]      History of Presenting Illness  Manju Mendoza is a 74 y.o. female COPD, MAC here for follow-up    Previous visit: Since starting triple therapy for MAC infection she is doing fair.  She had to reduce some of the dosing of these medications due to GI side effects particularly with nausea and vomiting.  At this time she is tolerating her current regimen.  She has a chronic cough that is mostly at night.  She attributes some of the her symptoms to acid reflux.  She also feels very anxious especially since starting these medications with decreased quality of sleep.  She denied new symptoms.  No fever, chills, worsening sputum production, hemoptysis. Patient had a chest CT done 9/25/2024 which showed her previous known lingular tree-in-bud nodular opacities.  There seems to be interval development of left lower lobe opacity as well.  Formal read has not been done.      Interval update: Doing fair today. Still taking triple therapy for MAC. Reports insomnia. Still having chronic that is mostly nonproductive. No fevers, chills, hemoptysis. Patient is seeing psych for insominia. I informed her that I am unsure if her triple therapy is causing her insomnia.     Review of Systems  Constitutional:  No fever. No chills. No weakness.  Eyes: No pain, erythema, or discharge. No blurring of vision.  ENT:  No sore throat, URI symptoms.   Cardiovascular:  No chest pain. No palpitations. No lower extremity edema.  Respiratory:  No shortness of breath, +chronic cough, pleuritic chest pain. No hemoptysis. No dyspnea.   GI:  Normal appetite. No nausea, vomiting, diarrhea. No pain. No melena.  Musculoskeletal:  No arthralgias or  myalgias.  Neurologic:  No headache. No weakness.    Family History   Problem Relation Age of Onset    Heart disease Mother     Cancer Mother     Colon cancer Mother 76    Heart attack Mother     Osteoporosis Mother     Arthritis Mother     Osteoporosis Father     Arthritis Father     Stroke Sister     Heart disease Sister     Cancer Sister     Diabetes Sister     Osteoporosis Sister     Arthritis Sister     Heart disease Brother     Diabetes Brother     Arthritis Brother     Stroke Brother     Osteoporosis Brother     Depression Other     Anxiety disorder Other         Social History     Socioeconomic History    Marital status:    Tobacco Use    Smoking status: Former     Current packs/day: 0.00     Average packs/day: 0.3 packs/day for 5.0 years (1.3 ttl pk-yrs)     Types: Cigarettes     Start date:      Quit date:      Years since quittin.1    Smokeless tobacco: Never   Vaping Use    Vaping status: Never Used   Substance and Sexual Activity    Alcohol use: Never    Drug use: Never    Sexual activity: Not Currently        Past Medical History:   Diagnosis Date    Allergic rhinitis     Anxiety     Arthritis     Asthma     Cervical pain (neck)     Cervical radiculopathy 2019    COPD (chronic obstructive pulmonary disease)     Degeneration of lumbar intervertebral disc 2019    Depression     Hemorrhoid     Hx of degenerative disc disease     Hyperlipidemia     Leg pain     Limb swelling     Low back pain     Memory change 2018    I WILL PURSUE A MOCA, LABS AND AN MRI OF THE BRAIN. I WILL REQUEST HER RECORDS BE SENT FOR MY REVIEW. PENDING THE RESULTS OF HER MOCA, REFERRAL FOR NEUROPSYCH TESTNIG COULD BE CONSIDERED    Mild episode of recurrent major depressive disorder 2020    Mycobacterium avium complex     Osteoarthritis     Shortness of breath     Shoulder pain 2014        Immunization History   Administered Date(s) Administered    COVID-19 (PFIZER) Purple Cap  Monovalent 05/27/2021, 05/27/2021, 08/24/2021, 08/24/2021    Covid-19 (Pfizer) Gray Cap Monovalent 01/21/2022    Flu Vaccine Split Quad 10/13/2020    Fluzone High-Dose 65+YRS 09/26/2024    Fluzone High-Dose 65+yrs 11/18/2021, 10/05/2022, 10/04/2023    Pneumococcal Conjugate 13-Valent (PCV13) 11/13/2015, 10/13/2020    Pneumococcal Conjugate 20-Valent (PCV20) 06/08/2023       Allergies   Allergen Reactions    Codeine Palpitations    Nsaids GI Intolerance          Current Outpatient Medications:     acetaminophen (TYLENOL) 500 MG tablet, Take 1 tablet by mouth Every 6 (Six) Hours As Needed., Disp: , Rfl:     azithromycin (ZITHROMAX) 250 MG tablet, Take 1 tablet by mouth 3 (Three) Times a Week. (Patient taking differently: Take 1 tablet by mouth 3 (Three) Times a Week. SUN, WED, FRI), Disp: 30 tablet, Rfl: 11    ethambutol (MYAMBUTOL) 400 MG tablet, Take 1.5 tablets by mouth Daily., Disp: 30 tablet, Rfl: 9    famotidine (Pepcid) 40 MG tablet, Take 1 tablet by mouth Every Night., Disp: 90 tablet, Rfl: 1    Ferrous Sulfate (IRON PO), Take 1 tablet by mouth Daily., Disp: , Rfl:     lidocaine (LIDODERM) 5 %, Place 1 patch on the skin as directed by provider Daily. Remove & Discard patch within 12 hours or as directed by MD, Disp: 90 each, Rfl: 3    LORazepam (Ativan) 0.5 MG tablet, Take 1 to 2 tablets at bedtime as needed for sleep., Disp: 60 tablet, Rfl: 5    metoprolol succinate XL (Toprol XL) 25 MG 24 hr tablet, Take 0.5 tablets by mouth 2 (Two) Times a Day., Disp: 90 tablet, Rfl: 1    mirtazapine (REMERON) 15 MG tablet, Take 1 tablet by mouth every night at bedtime., Disp: 90 tablet, Rfl: 3    multivitamin with minerals tablet tablet, Take 1 tablet by mouth Daily., Disp: , Rfl:     pantoprazole (PROTONIX) 40 MG EC tablet, Take 1 tablet by mouth Every Morning., Disp: 90 tablet, Rfl: 3    QUEtiapine (SEROquel) 100 MG tablet, Take 1 tablet by mouth Every Night., Disp: 90 tablet, Rfl: 1    rifAMPin (Rifadin) 300 MG capsule,  Take 1 capsule by mouth 2 (Two) Times a Day., Disp: 180 capsule, Rfl: 3    traZODone (DESYREL) 100 MG tablet, Take 2 tablets by mouth Every Night., Disp: 60 tablet, Rfl: 5     Objective     Vital Signs:   There were no vitals taken for this visit.    Physical Exam  There were no vitals filed for this visit.      General: Alert, NAD  HEENT:  EOMI, no sinus tenderness  Neck:  Supple, no thyromegaly,  no JVD  Lymph: no cervical, supraclavicular lymphadenopathy bilaterally  Chest:  clear to auscultation bilaterally, no wheezing or crackles; no work of breathing noted on room air  CV: RRR, no M/G/R, pulses 2+, equal.  Abd:  Soft, NT, ND, +BS  EXT:  no clubbing, no cyanosis, no edema b/l  Neuro:  A&Ox3, CN grossly intact, no focal deficits  Skin: No rashes or lesions noted       Result Review :   I have personally reviewed patient's labs and images.          Assessment and Plan      Patient Active Problem List   Diagnosis    Panlobular emphysema    Lumbar spondylosis    Mixed hyperlipidemia    Primary insomnia    Memory change    Moderate episode of recurrent major depressive disorder    Lymphocytosis    Well adult exam    Claudication    Medication monitoring encounter    Oropharyngeal dysphagia    Xerostomia    Weight loss    Chronic idiopathic constipation    PVC's (premature ventricular contractions)    Mycobacterium avium complex    Tobacco abuse, in remission    Primary hypertension       Impression and Plan:    MAC infection  Bronchiectasis seen on CT  COPD, FEV1 67% predicted  Abnormal chest CT  Former tobacco user, quit 1987    Continue triple therapy for MAC infection.  Patient will likely need 12 months of therapy.  -Azithromycin 250 mg 3 times a week.  -Rifampin 300mg 2 times daily --patient has been only taking this daily due to nausea.   -Ethambutal 600mg daily.    -Repeat chest CT done 1/14/2025 showed areas of improvement/areas of tree-in-bud opacities.  Continue MAC treatment per above.  -Will repeat  chest CT in 3 months.  Will check blood work at that time  -Follow-up in 3 months after blood work and repeat chest CT    Vaccination status: Patient is up-to-date with her vaccinations at this time    Medications personally reviewed.    Follow Up   No follow-ups on file.  Patient was given instructions and counseling regarding her condition or for health maintenance advice. Please see specific information pulled into the AVS if appropriate.

## 2025-02-27 ENCOUNTER — OFFICE VISIT (OUTPATIENT)
Dept: PSYCHIATRY | Facility: CLINIC | Age: 75
End: 2025-02-27
Payer: MEDICARE

## 2025-02-27 VITALS
HEART RATE: 66 BPM | HEIGHT: 61 IN | SYSTOLIC BLOOD PRESSURE: 153 MMHG | WEIGHT: 87 LBS | DIASTOLIC BLOOD PRESSURE: 66 MMHG | BODY MASS INDEX: 16.42 KG/M2

## 2025-02-27 DIAGNOSIS — F41.0 PANIC ATTACKS: ICD-10-CM

## 2025-02-27 DIAGNOSIS — F31.81 MIXED BIPOLAR II DISORDER: ICD-10-CM

## 2025-02-27 DIAGNOSIS — F51.05 INSOMNIA DUE TO MENTAL CONDITION: Primary | ICD-10-CM

## 2025-02-27 DIAGNOSIS — F41.1 GENERALIZED ANXIETY DISORDER: ICD-10-CM

## 2025-02-27 RX ORDER — QUETIAPINE 200 MG/1
200 TABLET, FILM COATED, EXTENDED RELEASE ORAL NIGHTLY
Qty: 30 TABLET | Refills: 0 | Status: SHIPPED | OUTPATIENT
Start: 2025-02-27

## 2025-02-27 NOTE — PROGRESS NOTES
"Subjective   Manju Mendoza is a 74 y.o. female who presents today for follow up    Chief Complaint: Bipolar 2    History of Present Illness:    Manju Mendoza is a 70 year old /White female who presents to the office today referred by Dustin Pennington DO.     Chart review 22: Seen . History of chronic anxiety and insomnia. Sleeping better on Ambien 12.5 at night extended release. Also on Klonopin 1 mg at night. Mirtazapine 15 mg at night. Olanzapine 5 mg at night. Trazodone 50 mg at night.  labs: Lipids are elevated, LFTs normal, creatinine 0.79, hematocrit 43, electrolytes normal, TSH 1.44, free T4 1.1, folate is normal, B12 is normal. No head imaging or EKG.     Chart review:   2025: int med; pulm; pt could not tolerate Seroquel 100 mg, so we cut it down to Seroquel 75 mg.  2025: cards, int med; added seroquel to her regimen and reduced olanzapine.   2024: reassuring B12, folate, TSH, low fT4, abnl cmp c02 29.1; reassuring cbc. Missed her 10/23/24 appnt.  2024: not seen in over a year.  Patient never followed up in 10 days like she was supposed to.  Seen by int med.   Cologuard neg  Swallow study done, functional deficit .    Plannin2025: Stop olanzapine and increase seroquel.  2024: Minimal change. On rifampin which reduces the concentration of olanzapine and trazodone. Increase for insomnia.  10/3/2024: Not seen in over a year. Rifampin for presumable CLAY decreases the concentration of both trazodone and olanzapine. Sleeping worse the last 3 mos; was doing better -- but not perfect -- before then. Mixed bipolar, insomnia. Increase olanzapine and trazodone temporarily. Close follow up.    Visits (Below):  \"Manju.\" Her parents called her Shae and she didn't like that.  Refuses to do a sleep study.  Pt likes to eat gum  P4 2025:   Interview:  \"No better than I was before.\"  I feel spaced out all the time  Some " "nights I sleep some, other nights I don't sleep  No change from 10/2024, when I began seeing her  Declines a sleep study  Declines admission  I feel anxious and depressed also; I get very anxious seeing a doctor.  Mood/Depression: depressed mood   Anxiety: excessive worrying   Panic attacks: stable  Energy: down  Concentration: baseline low  Insomnia: unclear, initial insomnia  Eatin, 89, 87, 86 lbs  Refills: y  Substances: def  Therapy: n  Medication compliant: y  SE: none  No SI HI AVH.      2025:   Interview:  \"I get to sleep but I'm waking up.\"  My lungs aren't getting better  The antibiotics get in the way of the meds working  Now just terminal insomnia  Mood/Depression: mild depressed mood improving  Anxiety: excessive worrying improving  Panic attacks: stable  Energy: down  Concentration: baseline low  Insomnia: initial and maintenance resolved; now terminal insomnia only at 3 to 4 am.  Eatin, 87, 86 lbs  Refills: y  Substances: def  Therapy: n  Medication compliant: y  SE: none  No SI HI AVH.      2024:   Interview:  \"I'm still not sleeping.\"  Getting sleep, but not enough.   Initial insomnia 3-4 hours some nights, also maintenance insomnia  Still repeating the same pattern: doesn't sleep well for 2 nights, then sleeps well the third night out of exhaustion  Mood/Depression: mild depressed mood  Anxiety: excessive worrying  Panic attacks: stable  Energy: down  Concentration: baseline low  Insomnia: initial and maintenance  Eatin, 86 lbs  Refills: y  Substances: def  Therapy: n  Medication compliant: y  SE: none  No SI HI AVH.      10/3/2024:   Interview:  \"I'm not doing good.\"   I'm sleeping, but only on and off  I spent 3 days at [place]  I never slept well  I was at [facility] for 9 days  I never slept well  Compliant on all medications, taking every night  Sometimes sleeps, sometimes doesn't  Sleeps every third night  I was diagnosed with a very severe lung infection, " TB-like  Mood/Depression:   Anxiety: minimal  Panic attacks: n  Energy: improved  Concentration: baseline low  Sleeping:   Eatin lbs  Refills: y  Substances: def  Therapy: n  Medication compliant: y  SE: some grogginess in the mornings/hungover feeling  No SI HI AVH.      PHQ-9 Depression Screening  Little interest or pleasure in doing things?     Feeling down, depressed, or hopeless?     Trouble falling or staying asleep, or sleeping too much?     Feeling tired or having little energy?     Poor appetite or overeating?     Feeling bad about yourself - or that you are a failure or have let yourself or your family down?     Trouble concentrating on things, such as reading the newspaper or watching television?     Moving or speaking so slowly that other people could have noticed? Or the opposite - being so fidgety or restless that you have been moving around a lot more than usual?     Thoughts that you would be better off dead, or of hurting yourself in some way?     PHQ-9 Total Score     If you checked off any problems, how difficult have these problems made it for you to do your work, take care of things at home, or get along with other people?            H&P:   Virtual visit via Zoom audio due to the COVID-19 pandemic for 46 minutes. Patient could not start the video. Patient is accepting of and agreeable to appointment. The appointment consisted of the patient and I only. Interview: Patient reports a lack of sleep for several years. She reports it has intensified over the last few months, she is unclear why. Also endorses some anxiety and depressive symptoms. What I noted today was that she was very difficult to interrupt, and was tangential, in addition to her insomnia. She denies ever being diagnosed with bipolar disorder, but she was trialed on Depakote and Seroquel in the past. She reports that Seroquel did help her sleep. She does not remember when she was placed on it. She also feels that her  "mirtazapine is making it harder for her to sleep, and is \"making her angrier,\" which does not surprise me.   Endorses muscle tension, fatigue, poor concentration, restlessness, irritability, insomnia. Now that she is taking Ambien, Klonopin, she is sleeping about 6 hours a night. Also endorses feelings of worthlessness. Also states \"I cry a lot.\"   Denies SI HI AVH. Has access to weapons that are locked away. Psychiatric review of systems is positive for anxiety and depression, possible domingo, negative for psychosis.   ...   Past Psychiatric History:  Began Psychiatric Treatment: When she was 25 years old   Dx: Anxiety, depression, insomnia   Psychiatrist: Dr. Pennington has been taking care of her for 1 year   Therapist: Julian   : [facility]   Admissions History: Has been admitted 3 times, the last time was 20 years ago.   Medication Trials: Multiple. Prozac, trazodone, Zoloft, Paxil, Cymbalta which worsened her insomnia, Seroquel helped her sleep. Depakote gave her hallucinations. She has never tried olanzapine or Abilify. She avoids antipsychotics because they cause weight gain.   Self-Harm: Denies   Suicide Attempts: Denies   Substance Abuse History:  Types: Denies all, including illicit   Withdrawl Symptoms: Not applicable   Longest period sober: Not applicable   AA: N/A   Admissions History: Denies   Residential History: Denies   Legal: N/A   Social History:  Marital Status:    Employed: No     Kids: 2 children   House: Lives in a house    Hx: No  history   Family History:  Suicide Attempts: Maternal grandfather committed suicide   Suicide Completions: Maternal grandfather committed suicide   Substance Use: Patient's daughter polysubstance use   Psychiatric Conditions: Her mom and brother both been on psychiatric medications    depression, psychosis, anxiety: Patient became extremely paranoid after having her daughter, she did not have any treatment. After she had her son, " she began to have insomnia   Developmental History:  Born: Deferred   Siblings: Deferred   Childhood: Deferred   High School: Deferred   College: Deferred     Past Surgical History:  Past Surgical History:   Procedure Laterality Date    APPENDECTOMY      BLADDER REPAIR      CARPAL TUNNEL RELEASE  1990    CHOLECYSTECTOMY  1978    COLONOSCOPY  2009    ENDOSCOPY      2013, 2009    ENDOSCOPY N/A 1/25/2024    Procedure: ESOPHAGOGASTRODUODENOSCOPY WITH BIOPSIES, BALLOON DILATATION 15-18;  Surgeon: Shawna Fournier MD;  Location: [facility] ENDOSCOPY;  Service: Gastroenterology;  Laterality: N/A;  ESPOHAGITIS AND GASTRITIS, ESOPHAGEAL STRICTURE    GALLBLADDER SURGERY      HYSTERECTOMY  1985    OTHER SURGICAL HISTORY      METAL IMPLANTS    OTHER SURGICAL HISTORY      SURGICAL CLIPS    SHOULDER SURGERY      TONSILLECTOMY      UPPER GASTROINTESTINAL ENDOSCOPY         Problem List:  Patient Active Problem List   Diagnosis    Panlobular emphysema    Lumbar spondylosis    Mixed hyperlipidemia    Primary insomnia    Memory change    Moderate episode of recurrent major depressive disorder    Lymphocytosis    Well adult exam    Claudication    Medication monitoring encounter    Oropharyngeal dysphagia    Xerostomia    Weight loss    Chronic idiopathic constipation    PVC's (premature ventricular contractions)    Mycobacterium avium complex    Tobacco abuse, in remission    Primary hypertension       Allergy:   Allergies   Allergen Reactions    Codeine Palpitations    Nsaids GI Intolerance        Discontinued Medications:  Medications Discontinued During This Encounter   Medication Reason    QUEtiapine (SEROquel) 100 MG tablet                  Current Medications:   Current Outpatient Medications   Medication Sig Dispense Refill    acetaminophen (TYLENOL) 500 MG tablet Take 1 tablet by mouth Every 6 (Six) Hours As Needed.      azithromycin (ZITHROMAX) 250 MG tablet Take 1 tablet by mouth 3 (Three) Times a Week. (Patient taking  differently: Take 1 tablet by mouth 3 (Three) Times a Week. SUN, WED, FRI) 30 tablet 11    ethambutol (MYAMBUTOL) 400 MG tablet Take 1.5 tablets by mouth Daily. 30 tablet 9    famotidine (Pepcid) 40 MG tablet Take 1 tablet by mouth Every Night. 90 tablet 1    Ferrous Sulfate (IRON PO) Take 1 tablet by mouth Daily.      lidocaine (LIDODERM) 5 % Place 1 patch on the skin as directed by provider Daily. Remove & Discard patch within 12 hours or as directed by MD 90 each 3    LORazepam (Ativan) 0.5 MG tablet Take 1 to 2 tablets at bedtime as needed for sleep. 60 tablet 5    metoprolol succinate XL (Toprol XL) 25 MG 24 hr tablet Take 0.5 tablets by mouth 2 (Two) Times a Day. 90 tablet 1    mirtazapine (REMERON) 15 MG tablet Take 1 tablet by mouth every night at bedtime. 90 tablet 3    multivitamin with minerals tablet tablet Take 1 tablet by mouth Daily.      pantoprazole (PROTONIX) 40 MG EC tablet Take 1 tablet by mouth Every Morning. 90 tablet 3    rifAMPin (Rifadin) 300 MG capsule Take 1 capsule by mouth 2 (Two) Times a Day. 180 capsule 3    traZODone (DESYREL) 100 MG tablet Take 2 tablets by mouth Every Night. 60 tablet 5    QUEtiapine XR (SEROquel XR) 200 MG 24 hr tablet Take 1 tablet by mouth Every Night. 30 tablet 0     No current facility-administered medications for this visit.       Past Medical History:  Past Medical History:   Diagnosis Date    Allergic rhinitis     Anxiety     Arthritis     Asthma     Cervical pain (neck)     Cervical radiculopathy 01/08/2019    COPD (chronic obstructive pulmonary disease)     Degeneration of lumbar intervertebral disc 01/08/2019    Depression     Hemorrhoid     Hx of degenerative disc disease     Hyperlipidemia     Leg pain     Limb swelling     Low back pain     Memory change 03/19/2018    I WILL PURSUE A MOCA, LABS AND AN MRI OF THE BRAIN. I WILL REQUEST HER RECORDS BE SENT FOR MY REVIEW. PENDING THE RESULTS OF HER MOCA, REFERRAL FOR NEUROPSYCH TESTNIG COULD BE CONSIDERED  "   Mild episode of recurrent major depressive disorder 06/05/2020    Mycobacterium avium complex     Osteoarthritis     Shortness of breath     Shoulder pain 03/21/2014         Mental Status Exam:   Hygiene:   good, wearing a mask, a little disheveled  Cooperation:  Cooperative  Eye Contact:  Good  Psychomotor Behavior:  Appropriate  Affect: euthymic, mood congruent, fair variability  Mood: \"I can sleep but I wake early\"  Hopelessness: Denies  Speech:  Normal  Thought Process:  Linear  Thought Content:  Normal  Suicidal:  None  Homicidal:  None  Hallucinations:  None  Delusion:  None  Memory:  Intact  Orientation:  Person, Place, Time and Situation  Reliability:  fair  Insight:  Fair  Judgement:  Fair  Impulse Control:  Fair  Physical/Medical Issues:  No      Review of Systems:  Review of Systems   Constitutional:  Positive for activity change and fatigue.   HENT:  Positive for congestion, sore throat and trouble swallowing.    Eyes:  Positive for visual disturbance.   Respiratory:  Positive for cough and shortness of breath.    Cardiovascular:  Positive for chest pain and palpitations.   Endocrine: Positive for cold intolerance and heat intolerance.   Genitourinary:  Positive for difficulty urinating.   Musculoskeletal:  Positive for arthralgias, gait problem and neck pain.   Allergic/Immunologic: Positive for immunocompromised state.   Neurological:  Positive for dizziness, weakness, light-headedness and numbness.         Physical Exam:  Physical Exam    Vital Signs:   /66   Pulse 66   Ht 154.9 cm (61\")   Wt 39.5 kg (87 lb)   BMI 16.44 kg/m²      Lab Results:   Lab on 11/20/2024   Component Date Value Ref Range Status    Vitamin B-12 11/20/2024 692  211 - 946 pg/mL Final    Folate 11/20/2024 >20.00  4.78 - 24.20 ng/mL Final    TSH 11/20/2024 1.480  0.270 - 4.200 uIU/mL Final    Free T4 11/20/2024 0.90 (L)  0.92 - 1.68 ng/dL Final    Glucose 11/20/2024 84  65 - 99 mg/dL Final    BUN 11/20/2024 10  8 - 23 " mg/dL Final    Creatinine 11/20/2024 0.76  0.57 - 1.00 mg/dL Final    Sodium 11/20/2024 142  136 - 145 mmol/L Final    Potassium 11/20/2024 4.0  3.5 - 5.2 mmol/L Final    Chloride 11/20/2024 102  98 - 107 mmol/L Final    CO2 11/20/2024 29.1 (H)  22.0 - 29.0 mmol/L Final    Calcium 11/20/2024 9.5  8.6 - 10.5 mg/dL Final    Total Protein 11/20/2024 7.5  6.0 - 8.5 g/dL Final    Albumin 11/20/2024 3.8  3.5 - 5.2 g/dL Final    ALT (SGPT) 11/20/2024 15  1 - 33 U/L Final    AST (SGOT) 11/20/2024 25  1 - 32 U/L Final    Alkaline Phosphatase 11/20/2024 112  39 - 117 U/L Final    Total Bilirubin 11/20/2024 0.2  0.0 - 1.2 mg/dL Final    Globulin 11/20/2024 3.7  gm/dL Final    A/G Ratio 11/20/2024 1.0  g/dL Final    BUN/Creatinine Ratio 11/20/2024 13.2  7.0 - 25.0 Final    Anion Gap 11/20/2024 10.9  5.0 - 15.0 mmol/L Final    eGFR 11/20/2024 81.8  >60.0 mL/min/1.73 Final    WBC 11/20/2024 6.54  3.40 - 10.80 10*3/mm3 Final    RBC 11/20/2024 4.12  3.77 - 5.28 10*6/mm3 Final    Hemoglobin 11/20/2024 13.4  12.0 - 15.9 g/dL Final    Hematocrit 11/20/2024 39.3  34.0 - 46.6 % Final    MCV 11/20/2024 95.4  79.0 - 97.0 fL Final    MCH 11/20/2024 32.5  26.6 - 33.0 pg Final    MCHC 11/20/2024 34.1  31.5 - 35.7 g/dL Final    RDW 11/20/2024 13.2  12.3 - 15.4 % Final    RDW-SD 11/20/2024 45.9  37.0 - 54.0 fl Final    MPV 11/20/2024 10.4  6.0 - 12.0 fL Final    Platelets 11/20/2024 269  140 - 450 10*3/mm3 Final    Neutrophil % 11/20/2024 44.9  42.7 - 76.0 % Final    Lymphocyte % 11/20/2024 41.9  19.6 - 45.3 % Final    Monocyte % 11/20/2024 11.3  5.0 - 12.0 % Final    Eosinophil % 11/20/2024 0.9  0.3 - 6.2 % Final    Basophil % 11/20/2024 0.8  0.0 - 1.5 % Final    Immature Grans % 11/20/2024 0.2  0.0 - 0.5 % Final    Neutrophils, Absolute 11/20/2024 2.94  1.70 - 7.00 10*3/mm3 Final    Lymphocytes, Absolute 11/20/2024 2.74  0.70 - 3.10 10*3/mm3 Final    Monocytes, Absolute 11/20/2024 0.74  0.10 - 0.90 10*3/mm3 Final    Eosinophils, Absolute  11/20/2024 0.06  0.00 - 0.40 10*3/mm3 Final    Basophils, Absolute 11/20/2024 0.05  0.00 - 0.20 10*3/mm3 Final    Immature Grans, Absolute 11/20/2024 0.01  0.00 - 0.05 10*3/mm3 Final    nRBC 11/20/2024 0.0  0.0 - 0.2 /100 WBC Final   Admission on 08/18/2024, Discharged on 08/18/2024   Component Date Value Ref Range Status    QT Interval 08/18/2024 368  ms Final    QTC Interval 08/18/2024 435  ms Final    Glucose 08/18/2024 231 (H)  65 - 99 mg/dL Final    BUN 08/18/2024 14  8 - 23 mg/dL Final    Creatinine 08/18/2024 0.83  0.57 - 1.00 mg/dL Final    Sodium 08/18/2024 134 (L)  136 - 145 mmol/L Final    Potassium 08/18/2024 4.0  3.5 - 5.2 mmol/L Final    Slight hemolysis detected by analyzer. Result may be falsely elevated.    Chloride 08/18/2024 99  98 - 107 mmol/L Final    CO2 08/18/2024 24.4  22.0 - 29.0 mmol/L Final    Calcium 08/18/2024 8.9  8.6 - 10.5 mg/dL Final    Total Protein 08/18/2024 7.2  6.0 - 8.5 g/dL Final    Albumin 08/18/2024 3.5  3.5 - 5.2 g/dL Final    ALT (SGPT) 08/18/2024 15  1 - 33 U/L Final    AST (SGOT) 08/18/2024 26  1 - 32 U/L Final    Alkaline Phosphatase 08/18/2024 81  39 - 117 U/L Final    Total Bilirubin 08/18/2024 0.2  0.0 - 1.2 mg/dL Final    Globulin 08/18/2024 3.7  gm/dL Final    A/G Ratio 08/18/2024 0.9  g/dL Final    BUN/Creatinine Ratio 08/18/2024 16.9  7.0 - 25.0 Final    Anion Gap 08/18/2024 10.6  5.0 - 15.0 mmol/L Final    eGFR 08/18/2024 73.6  >60.0 mL/min/1.73 Final    Ethanol 08/18/2024 <10  0 - 10 mg/dL Final    Ethanol % 08/18/2024 <0.010  % Final    Amphet/Methamphet, Screen 08/18/2024 Negative  Negative Final    Barbiturates Screen, Urine 08/18/2024 Negative  Negative Final    Benzodiazepine Screen, Urine 08/18/2024 Positive (A)  Negative Final    Cocaine Screen, Urine 08/18/2024 Negative  Negative Final    Opiate Screen 08/18/2024 Negative  Negative Final    THC, Screen, Urine 08/18/2024 Negative  Negative Final    Methadone Screen, Urine 08/18/2024 Negative  Negative  Final    Oxycodone Screen, Urine 08/18/2024 Negative  Negative Final    Fentanyl, Urine 08/18/2024 Negative  Negative Final    Acetaminophen 08/18/2024 <5.0  0.0 - 30.0 mcg/mL Final    Salicylate 08/18/2024 <0.3  <=30.0 mg/dL Final    TSH 08/18/2024 1.940  0.270 - 4.200 uIU/mL Final    Free T4 08/18/2024 1.19  0.92 - 1.68 ng/dL Final    HCG Quantitative 08/18/2024 2.71  mIU/mL Final    Extra Tube 08/18/2024 Hold for add-ons.   Final    Auto resulted.    Extra Tube 08/18/2024 hold for add-on   Final    Auto resulted    Extra Tube 08/18/2024 Hold for add-ons.   Final    Auto resulted.    Extra Tube 08/18/2024 Hold for add-ons.   Final    Auto resulted    WBC 08/18/2024 6.92  3.40 - 10.80 10*3/mm3 Final    RBC 08/18/2024 3.98  3.77 - 5.28 10*6/mm3 Final    Hemoglobin 08/18/2024 12.3  12.0 - 15.9 g/dL Final    Hematocrit 08/18/2024 36.6  34.0 - 46.6 % Final    MCV 08/18/2024 92.0  79.0 - 97.0 fL Final    MCH 08/18/2024 30.9  26.6 - 33.0 pg Final    MCHC 08/18/2024 33.6  31.5 - 35.7 g/dL Final    RDW 08/18/2024 14.1  12.3 - 15.4 % Final    RDW-SD 08/18/2024 47.6  37.0 - 54.0 fl Final    MPV 08/18/2024 9.7  6.0 - 12.0 fL Final    Platelets 08/18/2024 255  140 - 450 10*3/mm3 Final    Neutrophil % 08/18/2024 69.3  42.7 - 76.0 % Final    Lymphocyte % 08/18/2024 24.3  19.6 - 45.3 % Final    Monocyte % 08/18/2024 5.1  5.0 - 12.0 % Final    Eosinophil % 08/18/2024 0.3  0.3 - 6.2 % Final    Basophil % 08/18/2024 0.7  0.0 - 1.5 % Final    Immature Grans % 08/18/2024 0.3  0.0 - 0.5 % Final    Neutrophils, Absolute 08/18/2024 4.80  1.70 - 7.00 10*3/mm3 Final    Lymphocytes, Absolute 08/18/2024 1.68  0.70 - 3.10 10*3/mm3 Final    Monocytes, Absolute 08/18/2024 0.35  0.10 - 0.90 10*3/mm3 Final    Eosinophils, Absolute 08/18/2024 0.02  0.00 - 0.40 10*3/mm3 Final    Basophils, Absolute 08/18/2024 0.05  0.00 - 0.20 10*3/mm3 Final    Immature Grans, Absolute 08/18/2024 0.02  0.00 - 0.05 10*3/mm3 Final    nRBC 08/18/2024 0.0  0.0 - 0.2  /100 WBC Final    COVID19 08/18/2024 Not Detected  Not Detected - Ref. Range Final    Influenza A PCR 08/18/2024 Not Detected  Not Detected Final    Influenza B PCR 08/18/2024 Not Detected  Not Detected Final    RSV, PCR 08/18/2024 Not Detected  Not Detected Final    Color, UA 08/18/2024 Yellow  Yellow, Straw Final    Appearance, UA 08/18/2024 Clear  Clear Final    pH, UA 08/18/2024 7.0  5.0 - 8.0 Final    Specific Gravity, UA 08/18/2024 1.006  1.005 - 1.030 Final    Glucose, UA 08/18/2024 Negative  Negative Final    Ketones, UA 08/18/2024 Negative  Negative Final    Bilirubin, UA 08/18/2024 Negative  Negative Final    Blood, UA 08/18/2024 Negative  Negative Final    Protein, UA 08/18/2024 Negative  Negative Final    Leuk Esterase, UA 08/18/2024 Negative  Negative Final    Nitrite, UA 08/18/2024 Negative  Negative Final    Urobilinogen, UA 08/18/2024 0.2 E.U./dL  0.2 - 1.0 E.U./dL Final    Glucose 08/18/2024 101 (H)  70 - 99 mg/dL Final    Serial Number: [id number]:  [id number]   Admission on 08/13/2024, Discharged on 08/13/2024   Component Date Value Ref Range Status    Glucose 08/13/2024 84  65 - 99 mg/dL Final    BUN 08/13/2024 9  8 - 23 mg/dL Final    Creatinine 08/13/2024 0.80  0.57 - 1.00 mg/dL Final    Sodium 08/13/2024 141  136 - 145 mmol/L Final    Potassium 08/13/2024 4.3  3.5 - 5.2 mmol/L Final    Chloride 08/13/2024 104  98 - 107 mmol/L Final    CO2 08/13/2024 26.4  22.0 - 29.0 mmol/L Final    Calcium 08/13/2024 9.5  8.6 - 10.5 mg/dL Final    Total Protein 08/13/2024 7.5  6.0 - 8.5 g/dL Final    Albumin 08/13/2024 3.6  3.5 - 5.2 g/dL Final    ALT (SGPT) 08/13/2024 13  1 - 33 U/L Final    AST (SGOT) 08/13/2024 24  1 - 32 U/L Final    Alkaline Phosphatase 08/13/2024 100  39 - 117 U/L Final    Total Bilirubin 08/13/2024 0.2  0.0 - 1.2 mg/dL Final    Globulin 08/13/2024 3.9  gm/dL Final    A/G Ratio 08/13/2024 0.9  g/dL Final    BUN/Creatinine Ratio 08/13/2024 11.3  7.0 - 25.0 Final    Anion Gap  08/13/2024 10.6  5.0 - 15.0 mmol/L Final    eGFR 08/13/2024 76.9  >60.0 mL/min/1.73 Final    WBC 08/13/2024 7.92  3.40 - 10.80 10*3/mm3 Final    RBC 08/13/2024 4.25  3.77 - 5.28 10*6/mm3 Final    Hemoglobin 08/13/2024 13.2  12.0 - 15.9 g/dL Final    Hematocrit 08/13/2024 39.4  34.0 - 46.6 % Final    MCV 08/13/2024 92.7  79.0 - 97.0 fL Final    MCH 08/13/2024 31.1  26.6 - 33.0 pg Final    MCHC 08/13/2024 33.5  31.5 - 35.7 g/dL Final    RDW 08/13/2024 13.9  12.3 - 15.4 % Final    RDW-SD 08/13/2024 47.3  37.0 - 54.0 fl Final    MPV 08/13/2024 9.3  6.0 - 12.0 fL Final    Platelets 08/13/2024 264  140 - 450 10*3/mm3 Final    Neutrophil % 08/13/2024 67.5  42.7 - 76.0 % Final    Lymphocyte % 08/13/2024 23.5  19.6 - 45.3 % Final    Monocyte % 08/13/2024 7.8  5.0 - 12.0 % Final    Eosinophil % 08/13/2024 0.3  0.3 - 6.2 % Final    Basophil % 08/13/2024 0.6  0.0 - 1.5 % Final    Immature Grans % 08/13/2024 0.3  0.0 - 0.5 % Final    Neutrophils, Absolute 08/13/2024 5.35  1.70 - 7.00 10*3/mm3 Final    Lymphocytes, Absolute 08/13/2024 1.86  0.70 - 3.10 10*3/mm3 Final    Monocytes, Absolute 08/13/2024 0.62  0.10 - 0.90 10*3/mm3 Final    Eosinophils, Absolute 08/13/2024 0.02  0.00 - 0.40 10*3/mm3 Final    Basophils, Absolute 08/13/2024 0.05  0.00 - 0.20 10*3/mm3 Final    Immature Grans, Absolute 08/13/2024 0.02  0.00 - 0.05 10*3/mm3 Final    nRBC 08/13/2024 0.0  0.0 - 0.2 /100 WBC Final    Ethanol 08/13/2024 <10  0 - 10 mg/dL Final    Ethanol % 08/13/2024 <0.010  % Final    Acetaminophen 08/13/2024 <5.0  0.0 - 30.0 mcg/mL Final    Salicylate 08/13/2024 0.4  <=30.0 mg/dL Final    Amphet/Methamphet, Screen 08/13/2024 Negative  Negative Final    Barbiturates Screen, Urine 08/13/2024 Negative  Negative Final    Benzodiazepine Screen, Urine 08/13/2024 Positive (A)  Negative Final    Cocaine Screen, Urine 08/13/2024 Negative  Negative Final    Opiate Screen 08/13/2024 Negative  Negative Final    THC, Screen, Urine 08/13/2024 Negative   Negative Final    Methadone Screen, Urine 08/13/2024 Negative  Negative Final    Oxycodone Screen, Urine 08/13/2024 Negative  Negative Final    Fentanyl, Urine 08/13/2024 Negative  Negative Final    Magnesium 08/13/2024 2.4  1.6 - 2.4 mg/dL Final    Vitamin B-12 08/13/2024 748  211 - 946 pg/mL Final    QT Interval 08/13/2024 392  ms Final    QTC Interval 08/13/2024 437  ms Final    TSH 08/13/2024 2.040  0.270 - 4.200 uIU/mL Final    COVID19 08/13/2024 Not Detected  Not Detected - Ref. Range Final    Influenza A PCR 08/13/2024 Not Detected  Not Detected Final    Influenza B PCR 08/13/2024 Not Detected  Not Detected Final    RSV, PCR 08/13/2024 Not Detected  Not Detected Final    Color, UA 08/13/2024 Yellow  Yellow, Straw Final    Appearance, UA 08/13/2024 Clear  Clear Final    pH, UA 08/13/2024 8.0  5.0 - 8.0 Final    Specific Gravity, UA 08/13/2024 1.008  1.005 - 1.030 Final    Glucose, UA 08/13/2024 Negative  Negative Final    Ketones, UA 08/13/2024 Negative  Negative Final    Bilirubin, UA 08/13/2024 Negative  Negative Final    Blood, UA 08/13/2024 Negative  Negative Final    Protein, UA 08/13/2024 Negative  Negative Final    Leuk Esterase, UA 08/13/2024 Negative  Negative Final    Nitrite, UA 08/13/2024 Negative  Negative Final    Urobilinogen, UA 08/13/2024 0.2 E.U./dL  0.2 - 1.0 E.U./dL Final   Hospital Outpatient Visit on 08/09/2024   Component Date Value Ref Range Status    QT Interval 08/09/2024 386  ms Final    QTC Interval 08/09/2024 454  ms Final   Lab on 08/09/2024   Component Date Value Ref Range Status    Glucose 08/09/2024 130 (H)  65 - 99 mg/dL Final    BUN 08/09/2024 11  8 - 23 mg/dL Final    Creatinine 08/09/2024 0.97  0.57 - 1.00 mg/dL Final    Sodium 08/09/2024 141  136 - 145 mmol/L Final    Potassium 08/09/2024 4.2  3.5 - 5.2 mmol/L Final    Chloride 08/09/2024 100  98 - 107 mmol/L Final    CO2 08/09/2024 30.0 (H)  22.0 - 29.0 mmol/L Final    Calcium 08/09/2024 10.2  8.6 - 10.5 mg/dL Final     Total Protein 08/09/2024 8.1  6.0 - 8.5 g/dL Final    Albumin 08/09/2024 4.1  3.5 - 5.2 g/dL Final    ALT (SGPT) 08/09/2024 21  1 - 33 U/L Final    AST (SGOT) 08/09/2024 35 (H)  1 - 32 U/L Final    Alkaline Phosphatase 08/09/2024 126 (H)  39 - 117 U/L Final    Total Bilirubin 08/09/2024 0.3  0.0 - 1.2 mg/dL Final    Globulin 08/09/2024 4.0  gm/dL Final    A/G Ratio 08/09/2024 1.0  g/dL Final    BUN/Creatinine Ratio 08/09/2024 11.3  7.0 - 25.0 Final    Anion Gap 08/09/2024 11.0  5.0 - 15.0 mmol/L Final    eGFR 08/09/2024 61.1  >60.0 mL/min/1.73 Final    AFB Culture 08/10/2024 No AFB isolated at 6 weeks   Final    AFB Stain 08/10/2024 No acid fast bacilli seen on direct smear   Final    AFB Stain 08/10/2024 No acid fast bacilli seen on concentrated smear   Final    WBC 08/09/2024 9.56  3.40 - 10.80 10*3/mm3 Final    RBC 08/09/2024 4.54  3.77 - 5.28 10*6/mm3 Final    Hemoglobin 08/09/2024 13.7  12.0 - 15.9 g/dL Final    Hematocrit 08/09/2024 43.2  34.0 - 46.6 % Final    MCV 08/09/2024 95.2  79.0 - 97.0 fL Final    MCH 08/09/2024 30.2  26.6 - 33.0 pg Final    MCHC 08/09/2024 31.7  31.5 - 35.7 g/dL Final    RDW 08/09/2024 12.3  12.3 - 15.4 % Final    RDW-SD 08/09/2024 42.4  37.0 - 54.0 fl Final    MPV 08/09/2024 9.8  6.0 - 12.0 fL Final    Platelets 08/09/2024 330  140 - 450 10*3/mm3 Final    Neutrophil % 08/09/2024 61.4  42.7 - 76.0 % Final    Lymphocyte % 08/09/2024 30.8  19.6 - 45.3 % Final    Monocyte % 08/09/2024 7.1  5.0 - 12.0 % Final    Eosinophil % 08/09/2024 0.2 (L)  0.3 - 6.2 % Final    Basophil % 08/09/2024 0.3  0.0 - 1.5 % Final    Immature Grans % 08/09/2024 0.2  0.0 - 0.5 % Final    Neutrophils, Absolute 08/09/2024 5.87  1.70 - 7.00 10*3/mm3 Final    Lymphocytes, Absolute 08/09/2024 2.94  0.70 - 3.10 10*3/mm3 Final    Monocytes, Absolute 08/09/2024 0.68  0.10 - 0.90 10*3/mm3 Final    Eosinophils, Absolute 08/09/2024 0.02  0.00 - 0.40 10*3/mm3 Final    Basophils, Absolute 08/09/2024 0.03  0.00 - 0.20  10*3/mm3 Final    Immature Grans, Absolute 08/09/2024 0.02  0.00 - 0.05 10*3/mm3 Final    nRBC 08/09/2024 0.0  0.0 - 0.2 /100 WBC Final   Lab on 07/10/2024   Component Date Value Ref Range Status    Glucose 07/10/2024 86  65 - 99 mg/dL Final    BUN 07/10/2024 15  8 - 23 mg/dL Final    Creatinine 07/10/2024 0.89  0.57 - 1.00 mg/dL Final    Sodium 07/10/2024 142  136 - 145 mmol/L Final    Potassium 07/10/2024 4.2  3.5 - 5.2 mmol/L Final    Chloride 07/10/2024 101  98 - 107 mmol/L Final    CO2 07/10/2024 27.4  22.0 - 29.0 mmol/L Final    Calcium 07/10/2024 9.8  8.6 - 10.5 mg/dL Final    Total Protein 07/10/2024 8.4  6.0 - 8.5 g/dL Final    Albumin 07/10/2024 4.2  3.5 - 5.2 g/dL Final    ALT (SGPT) 07/10/2024 20  1 - 33 U/L Final    AST (SGOT) 07/10/2024 35 (H)  1 - 32 U/L Final    Alkaline Phosphatase 07/10/2024 112  39 - 117 U/L Final    Total Bilirubin 07/10/2024 0.2  0.0 - 1.2 mg/dL Final    Globulin 07/10/2024 4.2  gm/dL Final    A/G Ratio 07/10/2024 1.0  g/dL Final    BUN/Creatinine Ratio 07/10/2024 16.9  7.0 - 25.0 Final    Anion Gap 07/10/2024 13.6  5.0 - 15.0 mmol/L Final    eGFR 07/10/2024 67.7  >60.0 mL/min/1.73 Final    Blood Culture 07/10/2024 No growth at 5 days   Final    Blood Culture 07/10/2024 No growth at 5 days   Final    WBC 07/10/2024 6.51  3.40 - 10.80 10*3/mm3 Final    RBC 07/10/2024 4.78  3.77 - 5.28 10*6/mm3 Final    Hemoglobin 07/10/2024 14.8  12.0 - 15.9 g/dL Final    Hematocrit 07/10/2024 45.9  34.0 - 46.6 % Final    MCV 07/10/2024 96.0  79.0 - 97.0 fL Final    MCH 07/10/2024 31.0  26.6 - 33.0 pg Final    MCHC 07/10/2024 32.2  31.5 - 35.7 g/dL Final    RDW 07/10/2024 12.1 (L)  12.3 - 15.4 % Final    RDW-SD 07/10/2024 42.9  37.0 - 54.0 fl Final    MPV 07/10/2024 10.3  6.0 - 12.0 fL Final    Platelets 07/10/2024 330  140 - 450 10*3/mm3 Final    Neutrophil % 07/10/2024 51.6  42.7 - 76.0 % Final    Lymphocyte % 07/10/2024 38.9  19.6 - 45.3 % Final    Monocyte % 07/10/2024 7.4  5.0 - 12.0 % Final     Eosinophil % 07/10/2024 1.1  0.3 - 6.2 % Final    Basophil % 07/10/2024 0.8  0.0 - 1.5 % Final    Immature Grans % 07/10/2024 0.2  0.0 - 0.5 % Final    Neutrophils, Absolute 07/10/2024 3.37  1.70 - 7.00 10*3/mm3 Final    Lymphocytes, Absolute 07/10/2024 2.53  0.70 - 3.10 10*3/mm3 Final    Monocytes, Absolute 07/10/2024 0.48  0.10 - 0.90 10*3/mm3 Final    Eosinophils, Absolute 07/10/2024 0.07  0.00 - 0.40 10*3/mm3 Final    Basophils, Absolute 07/10/2024 0.05  0.00 - 0.20 10*3/mm3 Final    Immature Grans, Absolute 07/10/2024 0.01  0.00 - 0.05 10*3/mm3 Final    nRBC 07/10/2024 0.0  0.0 - 0.2 /100 WBC Final   Hospital Outpatient Visit on 07/09/2024   Component Date Value Ref Range Status    EF(MOD-bp) 07/09/2024 60.2  % Final    LVIDd 07/09/2024 3.2  cm Final    LVIDs 07/09/2024 2.16  cm Final    IVSd 07/09/2024 0.68  cm Final    LVPWd 07/09/2024 0.91  cm Final    FS 07/09/2024 32.9  % Final    IVS/LVPW 07/09/2024 0.74  cm Final    ESV(cubed) 07/09/2024 10.1  ml Final    EDV(cubed) 07/09/2024 33.4  ml Final    LV mass(C)d 07/09/2024 65.0  grams Final    LVOT area 07/09/2024 2.27  cm2 Final    LVOT diam 07/09/2024 1.70  cm Final    EDV(MOD-sp2) 07/09/2024 48.1  ml Final    EDV(MOD-sp4) 07/09/2024 46.1  ml Final    ESV(MOD-sp2) 07/09/2024 19.1  ml Final    ESV(MOD-sp4) 07/09/2024 17.0  ml Final    SV(MOD-sp2) 07/09/2024 29.0  ml Final    SV(MOD-sp4) 07/09/2024 29.1  ml Final    EF(MOD-sp2) 07/09/2024 60.3  % Final    EF(MOD-sp4) 07/09/2024 63.1  % Final    MV E max chacorta 07/09/2024 92.4  cm/sec Final    MV A max chacorta 07/09/2024 101.0  cm/sec Final    MV dec time 07/09/2024 0.32  sec Final    MV E/A 07/09/2024 0.91   Final    Pulm A Revs Dur 07/09/2024 0.12  sec Final    LA ESV Index (BP) 07/09/2024 12.0  ml/m2 Final    Med Peak E' Chacorta 07/09/2024 9.6  cm/sec Final    Lat Peak E' Chacorta 07/09/2024 11.8  cm/sec Final    TR max chacorta 07/09/2024 289.0  cm/sec Final    Avg E/e' ratio 07/09/2024 8.64   Final    SV(LVOT) 07/09/2024  57.4  ml Final    SV(RVOT) 07/09/2024 30.2  ml Final    Qp/Qs 07/09/2024 0.53   Final    RVIDd 07/09/2024 2.08  cm Final    TAPSE (>1.6) 07/09/2024 1.77  cm Final    RV S' 07/09/2024 15.4  cm/sec Final    LA dimension (2D)  07/09/2024 2.30  cm Final    Pulm Sys Chacorta 07/09/2024 91.9  cm/sec Final    Pulm Gannon Chacorta 07/09/2024 60.7  cm/sec Final    Pulm S/D 07/09/2024 1.51   Final    Pulm A Revs Chacorta 07/09/2024 27.9  cm/sec Final    LV V1 max 07/09/2024 138.0  cm/sec Final    LV V1 max PG 07/09/2024 7.6  mmHg Final    LV V1 mean PG 07/09/2024 2.00  mmHg Final    LV V1 VTI 07/09/2024 25.3  cm Final    Ao pk chacorta 07/09/2024 144.0  cm/sec Final    Ao max PG 07/09/2024 8.3  mmHg Final    Ao mean PG 07/09/2024 4.0  mmHg Final    Ao V2 VTI 07/09/2024 30.2  cm Final    LYNN(I,D) 07/09/2024 1.90  cm2 Final    AI P1/2t 07/09/2024 611.7  msec Final    MV mean PG 07/09/2024 2.00  mmHg Final    MV V2 VTI 07/09/2024 31.9  cm Final    MV P1/2t 07/09/2024 96.0  msec Final    MVA(P1/2t) 07/09/2024 2.29  cm2 Final    MVA(VTI) 07/09/2024 1.80  cm2 Final    MV dec slope 07/09/2024 308.0  cm/sec2 Final    TR max PG 07/09/2024 33.4  mmHg Final    RVSP(TR) 07/09/2024 38.4  mmHg Final    RAP systole 07/09/2024 5.0  mmHg Final    RVOT diam 07/09/2024 1.60  cm Final    RV V1 max PG 07/09/2024 2.00  mmHg Final    RV V1 max 07/09/2024 70.7  cm/sec Final    RV V1 VTI 07/09/2024 15.0  cm Final    PA V2 max 07/09/2024 71.1  cm/sec Final    Ao root diam 07/09/2024 2.6  cm Final    ACS 07/09/2024 1.30  cm Final    IVRT 07/09/2024 67.0  ms Final    Dimensionless Index 07/09/2024 0.84  (DI) Final    Ascending aorta 07/09/2024 2.5  cm Final   Orders Only on 06/27/2024   Component Date Value Ref Range Status    AFB Culture 07/01/2024 No AFB isolated at 6 weeks   Final    AFB Stain 07/01/2024 No acid fast bacilli seen on direct smear   Final    AFB Stain 07/01/2024 No acid fast bacilli seen on concentrated smear   Final    AFB Culture 07/01/2024 Other  (Organism type) (DO NOT USE)   Final    Unable to isolate sufficient AFB for identification due to gross contamination Performed by [facility]                   [address]                    [place] [address]     AFB Stain 07/01/2024 No acid fast bacilli seen on direct smear (C)   Final    AFB Stain 07/01/2024 No acid fast bacilli seen on concentrated smear (C)   Final    AFB Stain 07/01/2024 Acid fast bacilli seen on concentrated smear (C)   Final    AFB Culture 07/01/2024 No AFB isolated at 6 weeks   Final    AFB Stain 07/01/2024 No acid fast bacilli seen on direct smear   Final    AFB Stain 07/01/2024 No acid fast bacilli seen on concentrated smear   Final   Lab on 04/16/2024   Component Date Value Ref Range Status    Glucose 04/16/2024 92  65 - 99 mg/dL Final    BUN 04/16/2024 18  8 - 23 mg/dL Final    Creatinine 04/16/2024 0.90  0.57 - 1.00 mg/dL Final    Sodium 04/16/2024 145  136 - 145 mmol/L Final    Potassium 04/16/2024 4.0  3.5 - 5.2 mmol/L Final    Chloride 04/16/2024 102  98 - 107 mmol/L Final    CO2 04/16/2024 29.3 (H)  22.0 - 29.0 mmol/L Final    Calcium 04/16/2024 10.0  8.6 - 10.5 mg/dL Final    Total Protein 04/16/2024 7.5  6.0 - 8.5 g/dL Final    Albumin 04/16/2024 4.2  3.5 - 5.2 g/dL Final    ALT (SGPT) 04/16/2024 17  1 - 33 U/L Final    AST (SGOT) 04/16/2024 34 (H)  1 - 32 U/L Final    Alkaline Phosphatase 04/16/2024 90  39 - 117 U/L Final    Total Bilirubin 04/16/2024 0.3  0.0 - 1.2 mg/dL Final    Globulin 04/16/2024 3.3  gm/dL Final    A/G Ratio 04/16/2024 1.3  g/dL Final    BUN/Creatinine Ratio 04/16/2024 20.0  7.0 - 25.0 Final    Anion Gap 04/16/2024 13.7  5.0 - 15.0 mmol/L Final    eGFR 04/16/2024 67.2  >60.0 mL/min/1.73 Final    Total Cholesterol 04/16/2024 210 (H)  0 - 200 mg/dL Final    Triglycerides 04/16/2024 102  0 - 150 mg/dL Final    HDL Cholesterol 04/16/2024 71 (H)  40 - 60 mg/dL Final    LDL Cholesterol  04/16/2024 121 (H)  0 - 100 mg/dL Final     VLDL Cholesterol 04/16/2024 18  5 - 40 mg/dL Final    LDL/HDL Ratio 04/16/2024 1.67   Final    Vitamin B-12 04/16/2024 733  211 - 946 pg/mL Final    Folate 04/16/2024 >20.00  4.78 - 24.20 ng/mL Final    Sjogren's Anti-SS-A 04/16/2024 <0.2  0.0 - 0.9 AI Final    Sjogren's Anti-SS-B 04/16/2024 <0.2  0.0 - 0.9 AI Final    TSH 04/16/2024 1.890  0.270 - 4.200 uIU/mL Final    Free T4 04/16/2024 1.20  0.93 - 1.70 ng/dL Final    T4 results may be falsely increased if patient taking Biotin.    Phosphorus 04/16/2024 3.8  2.5 - 4.5 mg/dL Final       EKG Results:  No orders to display       Imaging Results:  No Images in the past 120 days found..      Assessment & Plan   Diagnoses and all orders for this visit:    1. Insomnia due to mental condition (Primary)  -     QUEtiapine XR (SEROquel XR) 200 MG 24 hr tablet; Take 1 tablet by mouth Every Night.  Dispense: 30 tablet; Refill: 0    2. Mixed bipolar II disorder  -     QUEtiapine XR (SEROquel XR) 200 MG 24 hr tablet; Take 1 tablet by mouth Every Night.  Dispense: 30 tablet; Refill: 0    3. Generalized anxiety disorder  -     QUEtiapine XR (SEROquel XR) 200 MG 24 hr tablet; Take 1 tablet by mouth Every Night.  Dispense: 30 tablet; Refill: 0    4. Panic attacks  -     QUEtiapine XR (SEROquel XR) 200 MG 24 hr tablet; Take 1 tablet by mouth Every Night.  Dispense: 30 tablet; Refill: 0        02/27/2025: This is basically uncharted territory, chronic insomnia for years. Declines admission, sleep study. Increase seroquel but change formulation to make the transition more tolerable (she didn't tolerate 200 mg of immediate release seroquel).      Acknowledged and normalized patient's thoughts, feelings, and concerns. Allowed patient to freely discuss and process issues, such as:  Anxiety and depression regarding family's well-being,   Anxiety regarding medical conditions  ... using Rogerian psychotherapeutic techniques including unconditional positive regard, reflective  listening, and demonstrating clear empathy, with the goal of ameliorating symptoms and maintaining, restoring, or improving self-esteem, adaptive skills, and ego or psychological functions (Samra et al. 1991), the long-term goal of which is to develop a better, healthier perspective and help the patient bear their circumstances more easily.  Time (minutes) spent providing supportive psychotherapy: 16  (This time is exclusive to the therapy session and separate from the time spent on activities used to meet the criteria for the E/M service (history, exam, medical decision-making).)  Start: 1:35  Stop: 1:51  Functional status: mild impairment  Treatment plan: Medication management and supportive psychotherapy  Prognosis: good  Progress: insomnia, bipolar mixed  6w    01/16/2025: Stop olanzapine and increase seroquel.    11/26/2024: Minimal change. On rifampin which reduces the concentration of olanzapine and trazodone. Increase for insomnia.    10/3/2024: Not seen in over a year. Rifampin for presumable CLAY decreases the concentration of both trazodone and olanzapine. Sleeping worse the last 3 mos; was doing better -- but not perfect -- before then. Mixed bipolar, insomnia. Increase olanzapine and trazodone temporarily. Close follow up.      7/31/23: Patient continues to make changes to meds on her own (reduced klonopin to 0.5 mg nightly), but she is finally sleeping. No changes for now.    6/13: Start gabapentin with traz and klonopin for insomnia. Close follow up.     2/14: Discussion that resolved our conflict.  Patient is still not sleeping, and has persistent anxiety.  We discussed the connection between the 2.  We will target insomnia.  Start by switching to Belsomra.  The plan is to try Belsomra plus clonazepam (we cannot discontinue clonazepam quickly as she has been on it chronically and it is a benzodiazepine) to see if she can sleep on this medication regimen.  Stop trazodone at this time.  Likely will  resume and later as the patient feels the trazodone helps with anxiety.     10/6: no changes. Now off seroquel and sleeping fairly well.     9/16: Continue downward titration of Seroquel and upward titration of trazodone.  Having some initial insomnia, but this is actually to be expected.  Also expect this to resolve in time.      9/2: Pt wants to get off seroquel for health reasons. Titrate off of it slowly.  Patient and I had a long conversation about not changing her medications on her own without telling me.  She voiced understanding and agreed to proceed.      7/22: Patient agreed to try Prozac.  Will keep us posted.     6/24: Get EKG now. If qtc reassuring will start low dose prozac and recheck. Pt is fearful of the combo of seroquel and prozac affecting her heart.     3/24: Add trazodone.     2/25: Urged patient to set up sleep study. Stop abilify, hydroxyzine (never started). Stop clonazepam. Continue seroquel 150 mg daily with miralax every few days (constipation), and start ambien.     2/14: Transition from Seroquel to Abilify.  10 mg of Abilify is equal to 100 mg of Seroquel.  To help patient with sleep, start hydroxyzine in the evenings.  Also increase melatonin.     11/15: Increase melatonin and seroquel.     10/29: Seroquel 150 xr helped, but led to severe depression. Switch to Latuda. 20 minutes of supportive psychotherapy 3 wks.    9/16: Patient has significant depression.  Insomnia is fairly controlled on multiple medications.  Mood and insomnia were better when we started Seroquel at the beginning of the year. Patient advised to back down on Klonopin to 0.5 mg nightly.  I will switch the Seroquel formulation to extended release in order to allow me to go up on the dose of the Seroquel to target bipolar hypomania.  Caution advised to the patient regarding sedation, dizziness, falls.  4 weeks.    8/18: now sleeping on klonopin 0.5 mg qhs, seroquel 75 mg qhs, melatonin 3 mg qhs. Continue.  Patient  advised that if the above regimen does not work, to add an additional 25 mg of Seroquel after 1 hour, and only to add the Ambien if she is unable to sleep after 1 hour from taking that extra dose of Seroquel.  4 weeks.    8/9: Continued insomnia.  Chronic.  Order sleep study.  Increase Seroquel and start Colace to target constipation.  See back in 4 weeks.  Patient will also start melatonin 3 mg nightly over-the-counter.  No bipolar domingo or hypomania present today; however, it is possible that bipolar could be the reason for her insomnia.  Patient's scores indicate depression and anxiety, both of which will be treated with a higher dose of Seroquel.  Consider switching to Seroquel extended release, which the patient may tolerate better.    6/8: Not sleeping again. Restart ambien. Continue Seroquel.  Continue Klonopin. Consider switching to vraylar. See back in 2 months. Status post mirtazapine, trazodone, and she never tried olanzapine.  She may discontinue Ambien for good.  Psychotherapy is deferred at this time.      Visit Diagnoses:    ICD-10-CM ICD-9-CM   1. Insomnia due to mental condition  F51.05 300.9     327.02   2. Mixed bipolar II disorder  F31.81 296.89   3. Generalized anxiety disorder  F41.1 300.02   4. Panic attacks  F41.0 300.01       PLAN:  Safety: no acute safety concerns.  Risk Assessment: Risk Assessment: Risk of self-harm acutely is low. Risk factors include mood disorder, access to weapons, recent psychosocial stressors (pandemic), family history. Protective factors include no present SI, no history of suicide attempts or self-harm in the past, no AODA, healthcare seeking, future orientation, willingness to engage in care, Gnosticism belief system. Risk of self-harm chronically is also low, but could be further elevated in the event of treatment noncompliance and/or AODA.  Safety: No acute safety concerns.  Medications:   CONTINUE ativan 0.5 mg 1-2 QHS. Risks, benefits, alternatives discussed  with patient including GI upset, sedation, dizziness, falls risk. After discussion of these risks and benefits, the patient voiced understanding and agreed to proceed. Vern ordered. UDS ordered. Controlled substances agreement verbally signed.   INCREASE seroquel 150 mg qhs to 200 mg XR. ( mg made her feel anxious without sleep 2/25). Risks, benefits, alternatives discussed with patient including nausea and vomiting, GI upset, sedation, dizziness, falls, akathisia, hypotension, increased appetite, lowering of seizure threshold, theoretical risk of tardive dyskinesia, extrapyramidal symptoms, movement issues, restless legs syndrome. Use care when operating vehicle, vessel, or machine. After discussion of these risks and benefits, the patient voiced understanding and agreed to proceed.  CONTINUE trazodone 200 mg nightly. Risks, benefits, side effects discussed with patient including GI upset, sedation, dizziness/falls risk, grogginess the following day, prolongation of the QTc interval.  After discussion of these risks and benefits, the patient voiced understanding and agreed to proceed.    CONTINUE mirtazapine 15 mg qhs. Risks, benefits, alternatives discussed with patient including GI upset, sedation, dizziness with falls risk, increased appetite.  Do not use before operating vehicle, vessel, or machine. After discussion of these risks and benefits, the patient voiced understanding and agreed to proceed.  S/P   olanzapine 5 mg qhs. Not effective 1/2025.  quetiapine 50 mg qhs PRN insomnia. 10/24  gabapentin 600 mg qhs. 10/24  Klonopin 0.5 mg PO QHS PRN anxiety.   seroquel 100 mg nightly. Constip, higher cholesterol  NEVER STARTED belsomra 5 mg nightly.  latuda 40 mg PO QDAY (samples given). No effect  Mirtazapine ineffective for insomnia.  Trazodone ineffective for insomnia (constipation at 100 mg nightly).  ambien ER 12.5 qhs. Somewhat helpful for insomnia in the past.  Doxepin caused constipation.  ambien  10 mg nightly. No particular reason.  Stopped melatonin 15 mg nightly. No reason.  NEVER STARTED prozac 10 mg daily after reassuring EKG (which we have).  Therapy: Deferred.      TREATMENT PLAN/GOALS: Continue supportive psychotherapy efforts and medications as indicated. Treatment and medication options discussed during today's visit. Patient ackowledged and verbally consented to continue with current treatment plan and was educated on the importance of compliance with treatment and follow-up appointments.    MEDICATION ISSUES:  PATRICIA reviewed as expected.  Discussed medication options and treatment plan of prescribed medication as well as the risks, benefits, and side effects including potential falls, possible impaired driving and metabolic adversities among others. Patient is agreeable to call the office with any worsening of symptoms or onset of side effects. Patient is agreeable to call 911 or go to the nearest ER should he/she begin having SI/HI. No medication side effects or related complaints today.     MEDS ORDERED DURING VISIT:  New Medications Ordered This Visit   Medications    QUEtiapine XR (SEROquel XR) 200 MG 24 hr tablet     Sig: Take 1 tablet by mouth Every Night.     Dispense:  30 tablet     Refill:  0     Replaces immediate release 100 mg. Thank you for the help. Please call with questions: [phone].       Return in about 6 weeks (around 4/10/2025).         This document has been electronically signed by Susie Moran MD  February 27, 2025 14:04 EST      Part of this note may be an electronic transcription/translation of spoken language to printed text using the Dragon Dictation System.

## 2025-03-05 RX ORDER — ETHAMBUTOL HYDROCHLORIDE 400 MG/1
600 TABLET, FILM COATED ORAL DAILY
Qty: 30 TABLET | Refills: 9 | Status: SHIPPED | OUTPATIENT
Start: 2025-03-05

## 2025-03-14 ENCOUNTER — TELEPHONE (OUTPATIENT)
Dept: PULMONOLOGY | Facility: CLINIC | Age: 75
End: 2025-03-14

## 2025-03-14 NOTE — TELEPHONE ENCOUNTER
Provider: DR. VALENTE    Caller: Manju Mendoza    Relationship to Patient: Self        Phone Number: [phone]     Reason for Call: PT WANTS TO KNOW IF SHE NEEDS BLOOD WORK BEFORE HER NEXT APPT

## 2025-03-24 DIAGNOSIS — F41.0 PANIC ATTACKS: ICD-10-CM

## 2025-03-24 DIAGNOSIS — F31.81 MIXED BIPOLAR II DISORDER: ICD-10-CM

## 2025-03-24 DIAGNOSIS — F41.1 GENERALIZED ANXIETY DISORDER: ICD-10-CM

## 2025-03-24 DIAGNOSIS — F51.05 INSOMNIA DUE TO MENTAL CONDITION: ICD-10-CM

## 2025-03-24 RX ORDER — QUETIAPINE 200 MG/1
200 TABLET, FILM COATED, EXTENDED RELEASE ORAL NIGHTLY
Qty: 30 TABLET | Refills: 2 | Status: SHIPPED | OUTPATIENT
Start: 2025-03-24

## 2025-03-24 NOTE — TELEPHONE ENCOUNTER
REFILL REQUEST:     QUEtiapine XR (SEROquel XR) 200 MG 24 hr tablet (02/27/2025)     F/UP- 04/17/2025.  LOV: 02/27/2025.

## 2025-03-25 ENCOUNTER — OFFICE VISIT (OUTPATIENT)
Dept: GASTROENTEROLOGY | Facility: CLINIC | Age: 75
End: 2025-03-25
Payer: MEDICARE

## 2025-03-25 VITALS
HEART RATE: 75 BPM | WEIGHT: 88 LBS | HEIGHT: 61 IN | BODY MASS INDEX: 16.62 KG/M2 | SYSTOLIC BLOOD PRESSURE: 141 MMHG | DIASTOLIC BLOOD PRESSURE: 70 MMHG

## 2025-03-25 DIAGNOSIS — K21.00 GASTROESOPHAGEAL REFLUX DISEASE WITH ESOPHAGITIS WITHOUT HEMORRHAGE: Primary | ICD-10-CM

## 2025-03-25 DIAGNOSIS — K59.03 DRUG-INDUCED CONSTIPATION: ICD-10-CM

## 2025-03-25 DIAGNOSIS — A31.0 MYCOBACTERIUM AVIUM COMPLEX: ICD-10-CM

## 2025-03-25 DIAGNOSIS — R11.0 NAUSEA: ICD-10-CM

## 2025-03-25 PROCEDURE — 3078F DIAST BP <80 MM HG: CPT | Performed by: NURSE PRACTITIONER

## 2025-03-25 PROCEDURE — 1159F MED LIST DOCD IN RCRD: CPT | Performed by: NURSE PRACTITIONER

## 2025-03-25 PROCEDURE — 3077F SYST BP >= 140 MM HG: CPT | Performed by: NURSE PRACTITIONER

## 2025-03-25 PROCEDURE — 99214 OFFICE O/P EST MOD 30 MIN: CPT | Performed by: NURSE PRACTITIONER

## 2025-03-25 PROCEDURE — 1160F RVW MEDS BY RX/DR IN RCRD: CPT | Performed by: NURSE PRACTITIONER

## 2025-03-25 NOTE — PROGRESS NOTES
Chief Complaint   Heartburn and Abdominal Pain    History of Present Illness       Manju Mendoza is a 74 y.o. female who presents to [facility] for follow-up for GERD. She was last seen in the office by me on 6/13/24.     She underwent EGD with Dr. Fournier on 1/25/2024 for trouble swallowing, heartburn and nausea and vomiting.  EGD showed nonsevere esophagitis.  Benign-appearing esophageal stenosis.  Dilated.  Biopsied.  Gastritis.  Path positive for gastritis and reflux esophagitis.  Recommendations were to start prescription for pantoprazole 20 mg daily.       She had esophagram done 7/23 that showed presbyesophagus.      She does admits her bowels are slow and she has to take a stool softener. Denies any rectal bleeding or melena. Still having upper and lower abd pain.  She is continuing to have reflux with burning up in her chest after she eats.  She will have some belching and burping as well.  She admits she has been on Pepcid and Protonix for quite a while.  She does me her family doctor did send her home with some Carafate liquid but she was afraid to take it.  She has been reading online and does not want to take any medicines with any potential long-term side effects.  That makes her very nervous.  But she is also concerned that her last colonoscopy was in 2009.  It was normal at that time per patient report.  She does report that her mother had colon cancer.  She did a Cologuard last year which was negative.  She tells me she is recently lost weight but today with our scales that she is actually gained 5 pounds.  She tells me she is just been afraid to eat because she did not really know what to eat given her reflux.  She has stopped her regular coffee.  She has been drinking decaf.     Last colonoscopy---2009---COLOGARD 2023 normal.      History of COPD      GI family history----Mother with colon cancer.       SHe had CT scan of the abdomen and pelvis done on  6/25/2024 that showed no acute abdominal or pelvic abnormality.  Partially imaged in the left lung base or multiple reticular nodular densities in a peribronchial distribution as well as consolidation in the base of the lingula.  Findings suggest an infectious process with peribronchial spread.  CLAY would be a top consideration.  Clinical correlation and correlation with dedicated chest CT may be of value.    She has been so wiped out from the lung infection. GERD is well controlled with protonix and pepcid. Bowels moving OK with fiber.   Results       Result Review :       CMP          8/13/2024    08:54 8/18/2024    09:26 11/20/2024    17:05   CMP   Glucose 84  231  84    BUN 9  14  10    Creatinine 0.80  0.83  0.76    EGFR 76.9  73.6  81.8    Sodium 141  134  142    Potassium 4.3  4.0  4.0    Chloride 104  99  102    Calcium 9.5  8.9  9.5    Total Protein 7.5  7.2  7.5    Albumin 3.6  3.5  3.8    Globulin 3.9  3.7  3.7    Total Bilirubin 0.2  0.2  0.2    Alkaline Phosphatase 100  81  112    AST (SGOT) 24  26  25    ALT (SGPT) 13  15  15    Albumin/Globulin Ratio 0.9  0.9  1.0    BUN/Creatinine Ratio 11.3  16.9  13.2    Anion Gap 10.6  10.6  10.9      CBC          8/13/2024    08:54 8/18/2024    09:26 11/20/2024    17:05   CBC   WBC 7.92  6.92  6.54    RBC 4.25  3.98  4.12    Hemoglobin 13.2  12.3  13.4    Hematocrit 39.4  36.6  39.3    MCV 92.7  92.0  95.4    MCH 31.1  30.9  32.5    MCHC 33.5  33.6  34.1    RDW 13.9  14.1  13.2    Platelets 264  255  269      CBC w/diff          8/13/2024    08:54 8/18/2024    09:26 11/20/2024    17:05   CBC w/Diff   WBC 7.92  6.92  6.54    RBC 4.25  3.98  4.12    Hemoglobin 13.2  12.3  13.4    Hematocrit 39.4  36.6  39.3    MCV 92.7  92.0  95.4    MCH 31.1  30.9  32.5    MCHC 33.5  33.6  34.1    RDW 13.9  14.1  13.2    Platelets 264  255  269    Neutrophil Rel % 67.5  69.3  44.9    Immature Granulocyte Rel % 0.3  0.3  0.2    Lymphocyte Rel % 23.5  24.3  41.9    Monocyte Rel %  "7.8  5.1  11.3    Eosinophil Rel % 0.3  0.3  0.9    Basophil Rel % 0.6  0.7  0.8      Lipid Panel          4/16/2024    08:56   Lipid Panel   Total Cholesterol 210    Triglycerides 102    HDL Cholesterol 71    VLDL Cholesterol 18    LDL Cholesterol  121    LDL/HDL Ratio 1.67      TSH          8/13/2024    08:54 8/18/2024    09:26 11/20/2024    17:05   TSH   TSH 2.040  1.940  1.480        Lipase No results found for: \"LIPASE\"  Amylase No results found for: \"AMYLASE\"  Iron Profile   Iron   Date Value Ref Range Status   09/05/2023 106 37 - 145 mcg/dL Final     TIBC   Date Value Ref Range Status   09/05/2023 383 298 - 536 mcg/dL Final     Iron Saturation (TSAT)   Date Value Ref Range Status   09/05/2023 28 20 - 50 % Final     Transferrin   Date Value Ref Range Status   09/05/2023 257 200 - 360 mg/dL Final     Ferritin   Ferritin   Date Value Ref Range Status   09/05/2023 169.00 (H) 13.00 - 150.00 ng/mL Final     ESR (Sed Rate)   Sed Rate   Date Value Ref Range Status   09/05/2023 19 0 - 30 mm/hr Final     CRP (C-Reactive)   C-Reactive Protein   Date Value Ref Range Status   09/05/2023 <0.30 0.00 - 0.50 mg/dL Final     Liver Workup   ds DNA Antibody   Date Value Ref Range Status   08/17/2020 NEGATIVE [IU]/mL Final     Ferritin   Date Value Ref Range Status   09/05/2023 169.00 (H) 13.00 - 150.00 ng/mL Final     Iron   Date Value Ref Range Status   09/05/2023 106 37 - 145 mcg/dL Final     TIBC   Date Value Ref Range Status   09/05/2023 383 298 - 536 mcg/dL Final     Iron Saturation (TSAT)   Date Value Ref Range Status   09/05/2023 28 20 - 50 % Final     Transferrin   Date Value Ref Range Status   09/05/2023 257 200 - 360 mg/dL Final               Past Medical History       Past Medical History:   Diagnosis Date    Allergic rhinitis     Anxiety     Arthritis     Asthma     Cervical pain (neck)     Cervical radiculopathy 01/08/2019    COPD (chronic obstructive pulmonary disease)     Degeneration of lumbar intervertebral disc " 01/08/2019    Depression     Hemorrhoid     Hx of degenerative disc disease     Hyperlipidemia     Leg pain     Limb swelling     Low back pain     Memory change 03/19/2018    I WILL PURSUE A MOCA, LABS AND AN MRI OF THE BRAIN. I WILL REQUEST HER RECORDS BE SENT FOR MY REVIEW. PENDING THE RESULTS OF HER MOCA, REFERRAL FOR NEUROPSYCH TESTNIG COULD BE CONSIDERED    Mild episode of recurrent major depressive disorder 06/05/2020    Mycobacterium avium complex     Osteoarthritis     Shortness of breath     Shoulder pain 03/21/2014       Past Surgical History:   Procedure Laterality Date    APPENDECTOMY      BLADDER REPAIR      CARPAL TUNNEL RELEASE  1990    CHOLECYSTECTOMY  1978    COLONOSCOPY  2009    ENDOSCOPY      2013, 2009    ENDOSCOPY N/A 1/25/2024    Procedure: ESOPHAGOGASTRODUODENOSCOPY WITH BIOPSIES, BALLOON DILATATION 15-18;  Surgeon: Shawna Fournier MD;  Location: [facility];  Service: Gastroenterology;  Laterality: N/A;  ESPOHAGITIS AND GASTRITIS, ESOPHAGEAL STRICTURE    GALLBLADDER SURGERY      HYSTERECTOMY  1985    OTHER SURGICAL HISTORY      METAL IMPLANTS    OTHER SURGICAL HISTORY      SURGICAL CLIPS    SHOULDER SURGERY      TONSILLECTOMY      UPPER GASTROINTESTINAL ENDOSCOPY           Current Outpatient Medications:     acetaminophen (TYLENOL) 500 MG tablet, Take 1 tablet by mouth Every 6 (Six) Hours As Needed., Disp: , Rfl:     azithromycin (ZITHROMAX) 250 MG tablet, Take 1 tablet by mouth 3 (Three) Times a Week. (Patient taking differently: Take 1 tablet by mouth 3 (Three) Times a Week. SUN, WED, FRI), Disp: 30 tablet, Rfl: 11    ethambutol (MYAMBUTOL) 400 MG tablet, TAKE 1 AND 1/2 TABLET BY MOUTH EVERY DAY, Disp: 30 tablet, Rfl: 9    famotidine (Pepcid) 40 MG tablet, Take 1 tablet by mouth Every Night., Disp: 90 tablet, Rfl: 1    Ferrous Sulfate (IRON PO), Take 1 tablet by mouth Daily., Disp: , Rfl:     lidocaine (LIDODERM) 5 %, Place 1 patch on the skin as directed by provider Daily.  Remove & Discard patch within 12 hours or as directed by MD, Disp: 90 each, Rfl: 3    LORazepam (Ativan) 0.5 MG tablet, Take 1 to 2 tablets at bedtime as needed for sleep., Disp: 60 tablet, Rfl: 5    metoprolol succinate XL (Toprol XL) 25 MG 24 hr tablet, Take 0.5 tablets by mouth 2 (Two) Times a Day., Disp: 90 tablet, Rfl: 1    mirtazapine (REMERON) 15 MG tablet, Take 1 tablet by mouth every night at bedtime., Disp: 90 tablet, Rfl: 3    multivitamin with minerals tablet tablet, Take 1 tablet by mouth Daily., Disp: , Rfl:     pantoprazole (PROTONIX) 40 MG EC tablet, Take 1 tablet by mouth Every Morning., Disp: 90 tablet, Rfl: 3    QUEtiapine XR (SEROquel XR) 200 MG 24 hr tablet, TAKE 1 TABLET BY MOUTH EVERY NIGHT, Disp: 30 tablet, Rfl: 2    rifAMPin (Rifadin) 300 MG capsule, Take 1 capsule by mouth 2 (Two) Times a Day., Disp: 180 capsule, Rfl: 3    traZODone (DESYREL) 100 MG tablet, Take 2 tablets by mouth Every Night., Disp: 60 tablet, Rfl: 5     Allergies   Allergen Reactions    Codeine Palpitations    Nsaids GI Intolerance       Family History   Problem Relation Age of Onset    Heart disease Mother     Cancer Mother     Colon cancer Mother 76    Heart attack Mother     Osteoporosis Mother     Arthritis Mother     Osteoporosis Father     Arthritis Father     Stroke Sister     Heart disease Sister     Cancer Sister     Diabetes Sister     Osteoporosis Sister     Arthritis Sister     Heart disease Brother     Diabetes Brother     Arthritis Brother     Stroke Brother     Osteoporosis Brother     Depression Other     Anxiety disorder Other         Social History     Social History Narrative    Not on file       Objective       Review of Systems   Constitutional:  Positive for fatigue. Negative for appetite change, fever, unexpected weight gain and unexpected weight loss.   HENT:  Negative for trouble swallowing.    Respiratory:  Negative for cough, choking, chest tightness, shortness of breath, wheezing and stridor.   "  Cardiovascular:  Negative for chest pain, palpitations and leg swelling.   Gastrointestinal:  Negative for abdominal distention, abdominal pain, anal bleeding, blood in stool, constipation, diarrhea, nausea, rectal pain, vomiting, GERD and indigestion.        Vital Signs:   /70 (BP Location: Left arm, Patient Position: Sitting, Cuff Size: Adult)   Pulse 75   Ht 154.9 cm (61\")   Wt 39.9 kg (88 lb)   BMI 16.63 kg/m²       Physical Exam  Constitutional:       General: She is not in acute distress.     Appearance: She is well-developed. She is not ill-appearing.   HENT:      Head: Normocephalic.   Eyes:      Pupils: Pupils are equal, round, and reactive to light.   Cardiovascular:      Rate and Rhythm: Normal rate and regular rhythm.      Heart sounds: Normal heart sounds.   Pulmonary:      Effort: Pulmonary effort is normal.      Breath sounds: Normal breath sounds.   Abdominal:      General: Bowel sounds are normal. There is no distension.      Palpations: Abdomen is soft. There is no mass.      Tenderness: There is no abdominal tenderness. There is no guarding or rebound.      Hernia: No hernia is present.   Musculoskeletal:         General: Normal range of motion.   Skin:     General: Skin is warm and dry.   Neurological:      Mental Status: She is alert and oriented to person, place, and time.   Psychiatric:         Speech: Speech normal.         Behavior: Behavior normal.         Judgment: Judgment normal.           Assessment & Plan          Assessment and Plan    Diagnoses and all orders for this visit:    1. Gastroesophageal reflux disease with esophagitis without hemorrhage (Primary)    2. Drug-induced constipation    3. Nausea    4. Mycobacterium avium complex    GERD seems well-controlled on pantoprazole in the morning and Pepcid at night.  Continue GERD precautions.  Still struggling with constipation I think despite using fiber every day.  Okay to try Senokot over-the-counter and see how she " does.  Continue a high-fiber diet.  Patient to follow-up with pulmonary as planned.  Patient to call the office in 2 weeks with an update.  Patient to follow-up with me in 6 months.  Patient is agreeable to the plan.            Follow Up       Follow Up   Return in about 6 months (around 9/25/2025) for GERD.  Patient was given instructions and counseling regarding her condition or for health maintenance advice. Please see specific information pulled into the AVS if appropriate.

## 2025-03-25 NOTE — PATIENT INSTRUCTIONS
OK to try SENEKOT (vegetable laxative) over the counter for constipation   OK to continue the fiber

## 2025-04-07 ENCOUNTER — OFFICE VISIT (OUTPATIENT)
Age: 75
End: 2025-04-07
Payer: MEDICARE

## 2025-04-07 VITALS
OXYGEN SATURATION: 95 % | HEIGHT: 61 IN | WEIGHT: 88.4 LBS | SYSTOLIC BLOOD PRESSURE: 104 MMHG | HEART RATE: 65 BPM | DIASTOLIC BLOOD PRESSURE: 54 MMHG | BODY MASS INDEX: 16.69 KG/M2

## 2025-04-07 DIAGNOSIS — Z00.00 WELL ADULT EXAM: ICD-10-CM

## 2025-04-07 DIAGNOSIS — I10 PRIMARY HYPERTENSION: Primary | ICD-10-CM

## 2025-04-07 DIAGNOSIS — E78.2 MIXED HYPERLIPIDEMIA: ICD-10-CM

## 2025-04-07 DIAGNOSIS — D50.9 IRON DEFICIENCY ANEMIA, UNSPECIFIED IRON DEFICIENCY ANEMIA TYPE: ICD-10-CM

## 2025-04-07 DIAGNOSIS — R63.4 WEIGHT LOSS: ICD-10-CM

## 2025-04-07 DIAGNOSIS — R13.12 OROPHARYNGEAL DYSPHAGIA: ICD-10-CM

## 2025-04-07 DIAGNOSIS — I49.3 PVC'S (PREMATURE VENTRICULAR CONTRACTIONS): ICD-10-CM

## 2025-04-07 RX ORDER — FERROUS SULFATE 324(65)MG
324 TABLET, DELAYED RELEASE (ENTERIC COATED) ORAL EVERY OTHER DAY
COMMUNITY

## 2025-04-07 RX ORDER — PANTOPRAZOLE SODIUM 40 MG/1
40 TABLET, DELAYED RELEASE ORAL 2 TIMES DAILY
Qty: 180 TABLET | Refills: 3 | Status: SHIPPED | OUTPATIENT
Start: 2025-04-07

## 2025-04-07 NOTE — ASSESSMENT & PLAN NOTE
Patient's weight is stable at 88 as of her 4/25 OV.  She was on Megace before, but she is holding steady with moderate dose Remeron presently.  Patient aware that she can eat anything she wants, follow-up on return to office.

## 2025-04-07 NOTE — ASSESSMENT & PLAN NOTE
Patient having persistent flare of this as of her 4/25 OV.  She mentioned this back in January, so we got her back on Pepcid in the evening in addition to her daily pantoprazole.  Discussed with patient that I would like to try the pantoprazole twice a day until she comes back to see if that makes a difference.

## 2025-04-07 NOTE — ASSESSMENT & PLAN NOTE
Patient's blood pressure with excellent control as of her 4/25 OV.  When we saw her in January of this year she was having some higher readings, but doing much better presently.  Her pulse is in the mid 60s.    She is just on half a tablet of metoprolol once a day, and that is certainly fine to continue.  Her prescription reads twice a day, will leave it like that in case she does need to increase it later.

## 2025-04-07 NOTE — ASSESSMENT & PLAN NOTE
Patient is in sinus, pulse is in the 60s, patient without any severe palpitations, certainly no sustained tachycardia.  She is just on low-dose metoprolol, continue same.

## 2025-04-07 NOTE — PROGRESS NOTES
"Chief Complaint  Hypertension and Follow-up (Pt didn't have labs, she states that she has no new issues she states that this is routine. )    Subjective      Manju Mendoza presents to [facility]    History of present illness:  Patient is a 74-year-old female with underlying COPD/emphysema/RAD, as well as mild hyperlipidemia, DJD, insomnia, among others, seen 6/23 as new patient, and who is coming in 4/25 for routine 3-month follow-up.  We will go over her med list in detail, review recent labs, address her care gaps, and make further recommendations at that time.    Review of Systems   Constitutional:  Negative for appetite change, fatigue and fever.   HENT:  Negative for congestion and ear pain.    Eyes:  Negative for blurred vision.   Respiratory:  Negative for cough, chest tightness, shortness of breath and wheezing.    Cardiovascular:  Negative for chest pain, palpitations and leg swelling.   Gastrointestinal:  Negative for abdominal pain.   Genitourinary:  Negative for difficulty urinating, dysuria and hematuria.   Musculoskeletal:  Negative for arthralgias and gait problem.   Skin:  Negative for skin lesions.   Neurological:  Negative for syncope, memory problem and confusion.   Psychiatric/Behavioral:  Negative for self-injury and depressed mood.        Objective   Vital Signs:   /54   Pulse 65   Ht 155 cm (61.01\")   Wt 40.1 kg (88 lb 6.4 oz)   SpO2 95%   BMI 16.70 kg/m²           Physical Exam  Vitals and nursing note reviewed.   Constitutional:       General: She is not in acute distress.     Appearance: Normal appearance. She is not toxic-appearing.   HENT:      Head: Atraumatic.      Right Ear: External ear normal.      Left Ear: External ear normal.      Nose: Nose normal.      Mouth/Throat:      Mouth: Mucous membranes are moist.   Eyes:      General:         Right eye: No discharge.         Left eye: No discharge.      Extraocular Movements: " Extraocular movements intact.      Pupils: Pupils are equal, round, and reactive to light.   Cardiovascular:      Rate and Rhythm: Normal rate and regular rhythm.      Pulses: Normal pulses.      Heart sounds: Normal heart sounds. No murmur heard.     No gallop.   Pulmonary:      Effort: Pulmonary effort is normal. No respiratory distress.      Breath sounds: No wheezing, rhonchi or rales.   Abdominal:      General: There is no distension.      Palpations: Abdomen is soft. There is no mass.      Tenderness: There is no abdominal tenderness. There is no guarding.   Musculoskeletal:         General: No swelling or tenderness.      Cervical back: No tenderness.      Right lower leg: No edema.      Left lower leg: No edema.   Skin:     General: Skin is warm and dry.      Findings: No rash.   Neurological:      General: No focal deficit present.      Mental Status: She is alert and oriented to person, place, and time. Mental status is at baseline.      Motor: No weakness.      Gait: Gait normal.   Psychiatric:         Mood and Affect: Mood normal.         Thought Content: Thought content normal.          Result Review   The following data was reviewed by: Scar Cisneros MD on              Assessment and Plan   Diagnoses and all orders for this visit:    1. Primary hypertension (Primary)  Assessment & Plan:  Patient's blood pressure with excellent control as of her 4/25 OV.  When we saw her in January of this year she was having some higher readings, but doing much better presently.  Her pulse is in the mid 60s.    She is just on half a tablet of metoprolol once a day, and that is certainly fine to continue.  Her prescription reads twice a day, will leave it like that in case she does need to increase it later.    Orders:  -     Comprehensive Metabolic Panel; Future    2. Weight loss  Overview:  My note 2/24:  Patient has lost 10 pounds over the past year, sounds like she may have lost 27 pounds over past several years.  She has had issues with her stomach, and just last month had a EGD by Dr. Fournier with dilatation. She is going to be on Protonix for some time now as well as Pepcid to help with the gastritis and esophagitis. Discussed with patient she needs to expand her diet, fatty foods are fine for now, will worry about her cholesterol later.    Assessment & Plan:  Patient's weight is stable at 88 as of her 4/25 OV.  She was on Megace before, but she is holding steady with moderate dose Remeron presently.  Patient aware that she can eat anything she wants, follow-up on return to office.      3. PVC's (premature ventricular contractions)  Overview:  Holter 7/24:  1.  Normal sinus rhythm  2.  Occasional APCs  3.  Runs of APCs  4.  No episodes of sinus arrest.  Paroxysmal atrial fibrillation or advanced AV block.     Echo 7/24:    The study is technically adequate for diagnosis.    Left ventricular systolic function is normal. Calculated left ventricular EF = 60.2%    Left ventricular diastolic function was normal.    Assessment & Plan:  Patient is in sinus, pulse is in the 60s, patient without any severe palpitations, certainly no sustained tachycardia.  She is just on low-dose metoprolol, continue same.      4. Oropharyngeal dysphagia  Overview:  EGD 1/24:  - The middle third of the esophagus was normal. Biopsies were taken with a cold forceps for histology. Findings: - Non-severe esophagitis was found at the gastroesophageal junction. Biopsies were taken with a cold forceps for histology. - One benign-appearing, intrinsic mild stenosis was found at the gastroesophageal junction. A TTS dilator was passed through the scope. Dilation with a 15-16.5-18 mm balloon dilator was performed to 18 mm. Biopsies were taken with a cold forceps for histology. - Diffuse inflammation characterized by erythema and granularity was found in the gastric antrum. Biopsies were taken with a cold forceps for histology. - The first portion of the  duodenum and second portion of the duodenum were normal.        Blossom Garces MA          Please advise it looks like Dr Larson order this  Me    Yes, let her know that since Dr. Larson ordered this, he did not come to us directly, to that is why she has not heard anything from this office.     I reviewed the study, there was no aspiration, so no immediate concerns for complications from this.  Below is the report from the speech therapist.  Please place orders for speech therapy to evaluate her further as an outpatient to help with compensatory strategies.  Please asked her to follow recommendations 1 through 3 as listed below.  Thanks.     IMPRESSIONS:   Ms. Mendoza demonstrated oropharyngeal dysphagia characterized by swallow delay, decreased opening of the cricopharyngeus with minimal esophageal backflow of solid.  No aspiration was observed during the study.      FUNCTIONAL DEFICIT:  Patient scored level 6 of 7 on Functional Communication Measures for swallowing indicating a 1-19% limitation in function for current status, goal status, and discharge status.     RECOMMENDATIONS:   1.  Diet with regular solids cut small additional moisture, thin liquid.  2.  Positioning fully upright for all p.o. intake and 30 minutes following.  3.  Alternate small bites and small sips of solids and liquids at a slow rate.  Double swallow each bite/sip.  4.  Patient may benefit from follow-up consult with speech pathology services.        Blossom Garces MA    Spoke with patient rely message, patient decline speech therapist referral.             Assessment & Plan:  Patient having persistent flare of this as of her 4/25 OV.  She mentioned this back in January, so we got her back on Pepcid in the evening in addition to her daily pantoprazole.  Discussed with patient that I would like to try the pantoprazole twice a day until she comes back to see if that makes a difference.      5. Iron deficiency anemia, unspecified  iron deficiency anemia type  -     CBC & Differential; Future  -     Iron Profile; Future  -     Ferritin; Future    6. Mixed hyperlipidemia  -     Lipid Panel; Future    7. Well adult exam  Overview:  Being utilized for labs only = no annual exam this OV.     Exercise: No CP, but BEAN with housework/etc.  Comprehensive labs: Reviewed all from .     Covid vaccine: Up to date as of .  Other vaccines: Rec prevnar .     MM22 per us now (S with breast ca).  Colon: Cologuard negative  = 5 years given +FH.     SH: , 2 kids nearby, former , non-smoker  FH: M had colon CA, S with breast ca.    Orders:  -     Vitamin B12 anemia; Future  -     Folate anemia; Future  -     Hemoglobin A1c; Future  -     TSH; Future  -     T4, free; Future    Other orders  -     pantoprazole (PROTONIX) 40 MG EC tablet; Take 1 tablet by mouth 2 (Two) Times a Day.  Dispense: 180 tablet; Refill: 3                        Follow Up   Return in about 4 months (around 2025).  Patient was given instructions and counseling regarding her condition or for health maintenance advice. Please see specific information pulled into the AVS if appropriate.     Total Time Spent:   minutes     This time includes time spent by me in the following activities: preparing for the visit, reviewing extensive past medical history and tests, performing a medically appropriate examination and/or evaluation, counseling and educating the patient and/or caregivers, ordering medications, tests, or procedures, referring and/or communicating with other health care professionals and documenting information in the medical record all on this date of service.

## 2025-04-15 ENCOUNTER — HOSPITAL ENCOUNTER (OUTPATIENT)
Dept: CT IMAGING | Facility: HOSPITAL | Age: 75
Discharge: HOME | End: 2025-04-15
Admitting: STUDENT IN AN ORGANIZED HEALTH CARE EDUCATION/TRAINING PROGRAM
Payer: MEDICARE

## 2025-04-15 DIAGNOSIS — A31.0 MYCOBACTERIUM AVIUM COMPLEX: ICD-10-CM

## 2025-04-15 PROCEDURE — 71250 CT THORAX DX C-: CPT

## 2025-04-17 ENCOUNTER — OFFICE VISIT (OUTPATIENT)
Dept: PSYCHIATRY | Facility: CLINIC | Age: 75
End: 2025-04-17
Payer: MEDICARE

## 2025-04-17 VITALS
HEART RATE: 70 BPM | WEIGHT: 88 LBS | BODY MASS INDEX: 16.62 KG/M2 | SYSTOLIC BLOOD PRESSURE: 129 MMHG | DIASTOLIC BLOOD PRESSURE: 73 MMHG | HEIGHT: 61 IN

## 2025-04-17 DIAGNOSIS — F51.05 INSOMNIA DUE TO MENTAL CONDITION: Primary | ICD-10-CM

## 2025-04-17 DIAGNOSIS — F41.0 PANIC ATTACKS: ICD-10-CM

## 2025-04-17 DIAGNOSIS — F41.1 GENERALIZED ANXIETY DISORDER: ICD-10-CM

## 2025-04-17 DIAGNOSIS — F31.81 MIXED BIPOLAR II DISORDER: ICD-10-CM

## 2025-04-17 RX ORDER — QUETIAPINE FUMARATE 50 MG/1
50 TABLET, EXTENDED RELEASE ORAL NIGHTLY
Qty: 90 EACH | Refills: 1 | Status: SHIPPED | OUTPATIENT
Start: 2025-04-17

## 2025-04-17 NOTE — PROGRESS NOTES
"Subjective   Manju Mendoza is a 74 y.o. female who presents today for follow up    Chief Complaint: Bipolar 2    History of Present Illness:    Manju Mendoza is a 70 year old /White female who presents to the office today referred by Dustin Pennington DO.     Chart review 22: Seen . History of chronic anxiety and insomnia. Sleeping better on Ambien 12.5 at night extended release. Also on Klonopin 1 mg at night. Mirtazapine 15 mg at night. Olanzapine 5 mg at night. Trazodone 50 mg at night.  labs: Lipids are elevated, LFTs normal, creatinine 0.79, hematocrit 43, electrolytes normal, TSH 1.44, free T4 1.1, folate is normal, B12 is normal. No head imaging or EKG.     Chart review:   2025: fam med, GI  2025: int med; pulm; pt could not tolerate Seroquel 100 mg, so we cut it down to Seroquel 75 mg.  2025: cards, int med; added seroquel to her regimen and reduced olanzapine.   2024: reassuring B12, folate, TSH, low fT4, abnl cmp c02 29.1; reassuring cbc. Missed her 10/23/24 appnt.  2024: not seen in over a year.  Patient never followed up in 10 days like she was supposed to.  Seen by int med.   Cologuard neg  Swallow study done, functional deficit .    Plannin2025: This is basically uncharted territory, chronic insomnia for years. Declines admission, sleep study. Increase seroquel but change formulation to make the transition more tolerable (she didn't tolerate 200 mg of immediate release seroquel).    2025: Stop olanzapine and increase seroquel.  2024: Minimal change. On rifampin which reduces the concentration of olanzapine and trazodone. Increase for insomnia.    Visits (Below):  \"Manju.\" Her parents called her Shae and she didn't like that.  Refuses to do a sleep study.  Pt likes to eat gum  P4 2025:   Interview:  \"Anxiety is better, sleep is a little better.\"  Sleeping 4 hours a night, has initial " "insomnia  \"I can tell it's helped a little.\"  I feel spaced out all the time  Some nights I sleep some, other nights I don't sleep  No change from 10/2024, when I began seeing her  Declines a sleep study  Declines admission  I feel anxious and depressed also; I get very anxious seeing a doctor.  Mood/Depression: depressed mood   Anxiety: excessive worrying   Panic attacks: stable  Energy: down  Concentration: baseline low  Insomnia: unclear, initial insomnia  Eatin, 87, 89, 87, 86 lbs  Refills: y  Substances: def  Therapy: n  Medication compliant: y  SE: none  No SI HI AVH.      2025:   Interview:  \"No better than I was before.\"  I feel spaced out all the time  Some nights I sleep some, other nights I don't sleep  No change from 10/2024, when I began seeing her  Declines a sleep study  Declines admission  I feel anxious and depressed also; I get very anxious seeing a doctor.  Mood/Depression: depressed mood   Anxiety: excessive worrying   Panic attacks: stable  Energy: down  Concentration: baseline low  Insomnia: unclear, initial insomnia  Eatin, 89, 87, 86 lbs  Refills: y  Substances: def  Therapy: n  Medication compliant: y  SE: none  No SI HI AVH.      2025:   Interview:  \"I get to sleep but I'm waking up.\"  My lungs aren't getting better  The antibiotics get in the way of the meds working  Now just terminal insomnia  Mood/Depression: mild depressed mood improving  Anxiety: excessive worrying improving  Panic attacks: stable  Energy: down  Concentration: baseline low  Insomnia: initial and maintenance resolved; now terminal insomnia only at 3 to 4 am.  Eatin, 87, 86 lbs  Refills: y  Substances: def  Therapy: n  Medication compliant: y  SE: none  No SI HI AVH.      2024:   Interview:  \"I'm still not sleeping.\"  Getting sleep, but not enough.   Initial insomnia 3-4 hours some nights, also maintenance insomnia  Still repeating the same pattern: doesn't sleep well for 2 nights, then " "sleeps well the third night out of exhaustion  Mood/Depression: mild depressed mood  Anxiety: excessive worrying  Panic attacks: stable  Energy: down  Concentration: baseline low  Insomnia: initial and maintenance  Eatin, 86 lbs  Refills: y  Substances: def  Therapy: n  Medication compliant: y  SE: none  No SI HI AVH.      10/3/2024:   Interview:  \"I'm not doing good.\"   I'm sleeping, but only on and off  I spent 3 days at [facility]  I never slept well  I was at [facility] for 9 days  I never slept well  Compliant on all medications, taking every night  Sometimes sleeps, sometimes doesn't  Sleeps every third night  I was diagnosed with a very severe lung infection, TB-like  Mood/Depression:   Anxiety: minimal  Panic attacks: n  Energy: improved  Concentration: baseline low  Sleeping:   Eatin lbs  Refills: y  Substances: def  Therapy: n  Medication compliant: y  SE: some grogginess in the mornings/hungover feeling  No SI HI AVH.      PHQ-9 Depression Screening  Little interest or pleasure in doing things?     Feeling down, depressed, or hopeless?     Trouble falling or staying asleep, or sleeping too much?     Feeling tired or having little energy?     Poor appetite or overeating?     Feeling bad about yourself - or that you are a failure or have let yourself or your family down?     Trouble concentrating on things, such as reading the newspaper or watching television?     Moving or speaking so slowly that other people could have noticed? Or the opposite - being so fidgety or restless that you have been moving around a lot more than usual?     Thoughts that you would be better off dead, or of hurting yourself in some way?     PHQ-9 Total Score     If you checked off any problems, how difficult have these problems made it for you to do your work, take care of things at home, or get along with other people?            H&P:   Virtual visit via Zoom audio due to the COVID- pandemic for 46 minutes. Patient " "could not start the video. Patient is accepting of and agreeable to appointment. The appointment consisted of the patient and I only. Interview: Patient reports a lack of sleep for several years. She reports it has intensified over the last few months, she is unclear why. Also endorses some anxiety and depressive symptoms. What I noted today was that she was very difficult to interrupt, and was tangential, in addition to her insomnia. She denies ever being diagnosed with bipolar disorder, but she was trialed on Depakote and Seroquel in the past. She reports that Seroquel did help her sleep. She does not remember when she was placed on it. She also feels that her mirtazapine is making it harder for her to sleep, and is \"making her angrier,\" which does not surprise me.   Endorses muscle tension, fatigue, poor concentration, restlessness, irritability, insomnia. Now that she is taking Ambien, Klonopin, she is sleeping about 6 hours a night. Also endorses feelings of worthlessness. Also states \"I cry a lot.\"   Denies SI HI AVH. Has access to weapons that are locked away. Psychiatric review of systems is positive for anxiety and depression, possible domingo, negative for psychosis.   ...   Past Psychiatric History:  Began Psychiatric Treatment: When she was 25 years old   Dx: Anxiety, depression, insomnia   Psychiatrist: Dr. Pennington has been taking care of her for 1 year   Therapist: Julian   : Denies   Admissions History: Has been admitted 3 times, the last time was 20 years ago.   Medication Trials: Multiple. Prozac, trazodone, Zoloft, Paxil, Cymbalta which worsened her insomnia, Seroquel helped her sleep. Depakote gave her hallucinations. She has never tried olanzapine or Abilify. She avoids antipsychotics because they cause weight gain.   Self-Harm: Denies   Suicide Attempts: Denies   Substance Abuse History:  Types: Denies all, including illicit   Withdrawl Symptoms: Not applicable   Longest period sober: " Not applicable   AA: N/A   Admissions History: Denies   Residential History: Denies   Legal: N/A   Social History:  Marital Status:    Employed: No     Kids: 2 children   House: Lives in a house    Hx: No  history   Family History:  Suicide Attempts: Maternal grandfather committed suicide   Suicide Completions: Maternal grandfather committed suicide   Substance Use: Patient's daughter polysubstance use   Psychiatric Conditions: Her mom and brother both been on psychiatric medications    depression, psychosis, anxiety: Patient became extremely paranoid after having her daughter, she did not have any treatment. After she had her son, she began to have insomnia   Developmental History:  Born: Deferred   Siblings: Deferred   Childhood: Deferred   High School: Deferred   College: Deferred     Past Surgical History:  Past Surgical History:   Procedure Laterality Date    APPENDECTOMY      BLADDER REPAIR      CARPAL TUNNEL RELEASE      CHOLECYSTECTOMY      COLONOSCOPY      ENDOSCOPY      2009    ENDOSCOPY N/A 2024    Procedure: ESOPHAGOGASTRODUODENOSCOPY WITH BIOPSIES, BALLOON DILATATION 15-18;  Surgeon: Shawna Fournier MD;  Location: [facility] ENDOSCOPY;  Service: Gastroenterology;  Laterality: N/A;  ESPOHAGITIS AND GASTRITIS, ESOPHAGEAL STRICTURE    GALLBLADDER SURGERY      HYSTERECTOMY      OTHER SURGICAL HISTORY      METAL IMPLANTS    OTHER SURGICAL HISTORY      SURGICAL CLIPS    SHOULDER SURGERY      TONSILLECTOMY      UPPER GASTROINTESTINAL ENDOSCOPY         Problem List:  Patient Active Problem List   Diagnosis    Panlobular emphysema    Lumbar spondylosis    Mixed hyperlipidemia    Primary insomnia    Memory change    Moderate episode of recurrent major depressive disorder    Lymphocytosis    Well adult exam    Claudication    Medication monitoring encounter    Oropharyngeal dysphagia    Xerostomia    Weight loss    Chronic idiopathic constipation     PVC's (premature ventricular contractions)    Mycobacterium avium complex    Tobacco abuse, in remission    Primary hypertension       Allergy:   Allergies   Allergen Reactions    Codeine Palpitations    Nsaids GI Intolerance        Discontinued Medications:  There are no discontinued medications.                Current Medications:   Current Outpatient Medications   Medication Sig Dispense Refill    acetaminophen (TYLENOL) 500 MG tablet Take 1 tablet by mouth Every 6 (Six) Hours As Needed.      azithromycin (ZITHROMAX) 250 MG tablet Take 1 tablet by mouth 3 (Three) Times a Week. (Patient taking differently: Take 1 tablet by mouth 3 (Three) Times a Week. SUN, WED, FRI) 30 tablet 11    ethambutol (MYAMBUTOL) 400 MG tablet TAKE 1 AND 1/2 TABLET BY MOUTH EVERY DAY 30 tablet 9    ferrous sulfate 324 (65 Fe) MG tablet delayed-release EC tablet Take 1 tablet by mouth Every Other Day.      lidocaine (LIDODERM) 5 % Place 1 patch on the skin as directed by provider Daily. Remove & Discard patch within 12 hours or as directed by MD 90 each 3    LORazepam (Ativan) 0.5 MG tablet Take 1 to 2 tablets at bedtime as needed for sleep. 60 tablet 5    metoprolol succinate XL (Toprol XL) 25 MG 24 hr tablet Take 0.5 tablets by mouth 2 (Two) Times a Day. 90 tablet 1    mirtazapine (REMERON) 15 MG tablet Take 1 tablet by mouth every night at bedtime. 90 tablet 3    multivitamin with minerals tablet tablet Take 1 tablet by mouth Daily.      pantoprazole (PROTONIX) 40 MG EC tablet Take 1 tablet by mouth 2 (Two) Times a Day. 180 tablet 3    QUEtiapine XR (SEROquel XR) 200 MG 24 hr tablet TAKE 1 TABLET BY MOUTH EVERY NIGHT 30 tablet 2    rifAMPin (Rifadin) 300 MG capsule Take 1 capsule by mouth 2 (Two) Times a Day. 180 capsule 3    traZODone (DESYREL) 100 MG tablet Take 2 tablets by mouth Every Night. 60 tablet 5    QUEtiapine fumarate ER (SEROquel XR) 50 MG tablet sustained-release 24 hour tablet Take 1 tablet by mouth Every Night. 90 each 1  "    No current facility-administered medications for this visit.       Past Medical History:  Past Medical History:   Diagnosis Date    Allergic rhinitis     Anxiety     Arthritis     Asthma     Cervical pain (neck)     Cervical radiculopathy 01/08/2019    COPD (chronic obstructive pulmonary disease)     Degeneration of lumbar intervertebral disc 01/08/2019    Depression     Hemorrhoid     Hx of degenerative disc disease     Hyperlipidemia     Leg pain     Limb swelling     Low back pain     Memory change 03/19/2018    I WILL PURSUE A MOCA, LABS AND AN MRI OF THE BRAIN. I WILL REQUEST HER RECORDS BE SENT FOR MY REVIEW. PENDING THE RESULTS OF HER MOCA, REFERRAL FOR NEUROPSYCH TESTNIG COULD BE CONSIDERED    Mild episode of recurrent major depressive disorder 06/05/2020    Mycobacterium avium complex     Osteoarthritis     Shortness of breath     Shoulder pain 03/21/2014         Mental Status Exam:   Hygiene:   good, wearing a mask, a little disheveled  Cooperation:  Cooperative  Eye Contact:  Good  Psychomotor Behavior:  Appropriate  Affect: euthymic, mood congruent, fair variability  Mood: \"I can sleep but I wake early\"  Hopelessness: Denies  Speech:  Normal  Thought Process:  Linear  Thought Content:  Normal  Suicidal:  None  Homicidal:  None  Hallucinations:  None  Delusion:  None  Memory:  Intact  Orientation:  Person, Place, Time and Situation  Reliability:  fair  Insight:  Fair  Judgement:  Fair  Impulse Control:  Fair  Physical/Medical Issues:  No      Review of Systems:  Review of Systems   Constitutional:  Positive for activity change and fatigue.   HENT:  Positive for congestion, sore throat and trouble swallowing.    Eyes:  Positive for visual disturbance.   Respiratory:  Positive for cough and shortness of breath.    Cardiovascular:  Positive for chest pain and palpitations.   Endocrine: Positive for cold intolerance and heat intolerance.   Genitourinary:  Positive for difficulty urinating. " "  Musculoskeletal:  Positive for arthralgias, gait problem and neck pain.   Allergic/Immunologic: Positive for immunocompromised state.   Neurological:  Positive for dizziness, weakness, light-headedness and numbness.         Physical Exam:  Physical Exam    Vital Signs:   /73   Pulse 70   Ht 155 cm (61.02\")   Wt 39.9 kg (88 lb)   BMI 16.61 kg/m²      Lab Results:   Lab on 11/20/2024   Component Date Value Ref Range Status    Vitamin B-12 11/20/2024 692  211 - 946 pg/mL Final    Folate 11/20/2024 >20.00  4.78 - 24.20 ng/mL Final    TSH 11/20/2024 1.480  0.270 - 4.200 uIU/mL Final    Free T4 11/20/2024 0.90 (L)  0.92 - 1.68 ng/dL Final    Glucose 11/20/2024 84  65 - 99 mg/dL Final    BUN 11/20/2024 10  8 - 23 mg/dL Final    Creatinine 11/20/2024 0.76  0.57 - 1.00 mg/dL Final    Sodium 11/20/2024 142  136 - 145 mmol/L Final    Potassium 11/20/2024 4.0  3.5 - 5.2 mmol/L Final    Chloride 11/20/2024 102  98 - 107 mmol/L Final    CO2 11/20/2024 29.1 (H)  22.0 - 29.0 mmol/L Final    Calcium 11/20/2024 9.5  8.6 - 10.5 mg/dL Final    Total Protein 11/20/2024 7.5  6.0 - 8.5 g/dL Final    Albumin 11/20/2024 3.8  3.5 - 5.2 g/dL Final    ALT (SGPT) 11/20/2024 15  1 - 33 U/L Final    AST (SGOT) 11/20/2024 25  1 - 32 U/L Final    Alkaline Phosphatase 11/20/2024 112  39 - 117 U/L Final    Total Bilirubin 11/20/2024 0.2  0.0 - 1.2 mg/dL Final    Globulin 11/20/2024 3.7  gm/dL Final    A/G Ratio 11/20/2024 1.0  g/dL Final    BUN/Creatinine Ratio 11/20/2024 13.2  7.0 - 25.0 Final    Anion Gap 11/20/2024 10.9  5.0 - 15.0 mmol/L Final    eGFR 11/20/2024 81.8  >60.0 mL/min/1.73 Final    WBC 11/20/2024 6.54  3.40 - 10.80 10*3/mm3 Final    RBC 11/20/2024 4.12  3.77 - 5.28 10*6/mm3 Final    Hemoglobin 11/20/2024 13.4  12.0 - 15.9 g/dL Final    Hematocrit 11/20/2024 39.3  34.0 - 46.6 % Final    MCV 11/20/2024 95.4  79.0 - 97.0 fL Final    MCH 11/20/2024 32.5  26.6 - 33.0 pg Final    MCHC 11/20/2024 34.1  31.5 - 35.7 g/dL Final    " RDW 11/20/2024 13.2  12.3 - 15.4 % Final    RDW-SD 11/20/2024 45.9  37.0 - 54.0 fl Final    MPV 11/20/2024 10.4  6.0 - 12.0 fL Final    Platelets 11/20/2024 269  140 - 450 10*3/mm3 Final    Neutrophil % 11/20/2024 44.9  42.7 - 76.0 % Final    Lymphocyte % 11/20/2024 41.9  19.6 - 45.3 % Final    Monocyte % 11/20/2024 11.3  5.0 - 12.0 % Final    Eosinophil % 11/20/2024 0.9  0.3 - 6.2 % Final    Basophil % 11/20/2024 0.8  0.0 - 1.5 % Final    Immature Grans % 11/20/2024 0.2  0.0 - 0.5 % Final    Neutrophils, Absolute 11/20/2024 2.94  1.70 - 7.00 10*3/mm3 Final    Lymphocytes, Absolute 11/20/2024 2.74  0.70 - 3.10 10*3/mm3 Final    Monocytes, Absolute 11/20/2024 0.74  0.10 - 0.90 10*3/mm3 Final    Eosinophils, Absolute 11/20/2024 0.06  0.00 - 0.40 10*3/mm3 Final    Basophils, Absolute 11/20/2024 0.05  0.00 - 0.20 10*3/mm3 Final    Immature Grans, Absolute 11/20/2024 0.01  0.00 - 0.05 10*3/mm3 Final    nRBC 11/20/2024 0.0  0.0 - 0.2 /100 WBC Final   Admission on 08/18/2024, Discharged on 08/18/2024   Component Date Value Ref Range Status    QT Interval 08/18/2024 368  ms Final    QTC Interval 08/18/2024 435  ms Final    Glucose 08/18/2024 231 (H)  65 - 99 mg/dL Final    BUN 08/18/2024 14  8 - 23 mg/dL Final    Creatinine 08/18/2024 0.83  0.57 - 1.00 mg/dL Final    Sodium 08/18/2024 134 (L)  136 - 145 mmol/L Final    Potassium 08/18/2024 4.0  3.5 - 5.2 mmol/L Final    Slight hemolysis detected by analyzer. Result may be falsely elevated.    Chloride 08/18/2024 99  98 - 107 mmol/L Final    CO2 08/18/2024 24.4  22.0 - 29.0 mmol/L Final    Calcium 08/18/2024 8.9  8.6 - 10.5 mg/dL Final    Total Protein 08/18/2024 7.2  6.0 - 8.5 g/dL Final    Albumin 08/18/2024 3.5  3.5 - 5.2 g/dL Final    ALT (SGPT) 08/18/2024 15  1 - 33 U/L Final    AST (SGOT) 08/18/2024 26  1 - 32 U/L Final    Alkaline Phosphatase 08/18/2024 81  39 - 117 U/L Final    Total Bilirubin 08/18/2024 0.2  0.0 - 1.2 mg/dL Final    Globulin 08/18/2024 3.7  gm/dL  Final    A/G Ratio 08/18/2024 0.9  g/dL Final    BUN/Creatinine Ratio 08/18/2024 16.9  7.0 - 25.0 Final    Anion Gap 08/18/2024 10.6  5.0 - 15.0 mmol/L Final    eGFR 08/18/2024 73.6  >60.0 mL/min/1.73 Final    Ethanol 08/18/2024 <10  0 - 10 mg/dL Final    Ethanol % 08/18/2024 <0.010  % Final    Amphet/Methamphet, Screen 08/18/2024 Negative  Negative Final    Barbiturates Screen, Urine 08/18/2024 Negative  Negative Final    Benzodiazepine Screen, Urine 08/18/2024 Positive (A)  Negative Final    Cocaine Screen, Urine 08/18/2024 Negative  Negative Final    Opiate Screen 08/18/2024 Negative  Negative Final    THC, Screen, Urine 08/18/2024 Negative  Negative Final    Methadone Screen, Urine 08/18/2024 Negative  Negative Final    Oxycodone Screen, Urine 08/18/2024 Negative  Negative Final    Fentanyl, Urine 08/18/2024 Negative  Negative Final    Acetaminophen 08/18/2024 <5.0  0.0 - 30.0 mcg/mL Final    Salicylate 08/18/2024 <0.3  <=30.0 mg/dL Final    TSH 08/18/2024 1.940  0.270 - 4.200 uIU/mL Final    Free T4 08/18/2024 1.19  0.92 - 1.68 ng/dL Final    HCG Quantitative 08/18/2024 2.71  mIU/mL Final    Extra Tube 08/18/2024 Hold for add-ons.   Final    Auto resulted.    Extra Tube 08/18/2024 hold for add-on   Final    Auto resulted    Extra Tube 08/18/2024 Hold for add-ons.   Final    Auto resulted.    Extra Tube 08/18/2024 Hold for add-ons.   Final    Auto resulted    WBC 08/18/2024 6.92  3.40 - 10.80 10*3/mm3 Final    RBC 08/18/2024 3.98  3.77 - 5.28 10*6/mm3 Final    Hemoglobin 08/18/2024 12.3  12.0 - 15.9 g/dL Final    Hematocrit 08/18/2024 36.6  34.0 - 46.6 % Final    MCV 08/18/2024 92.0  79.0 - 97.0 fL Final    MCH 08/18/2024 30.9  26.6 - 33.0 pg Final    MCHC 08/18/2024 33.6  31.5 - 35.7 g/dL Final    RDW 08/18/2024 14.1  12.3 - 15.4 % Final    RDW-SD 08/18/2024 47.6  37.0 - 54.0 fl Final    MPV 08/18/2024 9.7  6.0 - 12.0 fL Final    Platelets 08/18/2024 255  140 - 450 10*3/mm3 Final    Neutrophil % 08/18/2024 69.3   42.7 - 76.0 % Final    Lymphocyte % 08/18/2024 24.3  19.6 - 45.3 % Final    Monocyte % 08/18/2024 5.1  5.0 - 12.0 % Final    Eosinophil % 08/18/2024 0.3  0.3 - 6.2 % Final    Basophil % 08/18/2024 0.7  0.0 - 1.5 % Final    Immature Grans % 08/18/2024 0.3  0.0 - 0.5 % Final    Neutrophils, Absolute 08/18/2024 4.80  1.70 - 7.00 10*3/mm3 Final    Lymphocytes, Absolute 08/18/2024 1.68  0.70 - 3.10 10*3/mm3 Final    Monocytes, Absolute 08/18/2024 0.35  0.10 - 0.90 10*3/mm3 Final    Eosinophils, Absolute 08/18/2024 0.02  0.00 - 0.40 10*3/mm3 Final    Basophils, Absolute 08/18/2024 0.05  0.00 - 0.20 10*3/mm3 Final    Immature Grans, Absolute 08/18/2024 0.02  0.00 - 0.05 10*3/mm3 Final    nRBC 08/18/2024 0.0  0.0 - 0.2 /100 WBC Final    COVID19 08/18/2024 Not Detected  Not Detected - Ref. Range Final    Influenza A PCR 08/18/2024 Not Detected  Not Detected Final    Influenza B PCR 08/18/2024 Not Detected  Not Detected Final    RSV, PCR 08/18/2024 Not Detected  Not Detected Final    Color, UA 08/18/2024 Yellow  Yellow, Straw Final    Appearance, UA 08/18/2024 Clear  Clear Final    pH, UA 08/18/2024 7.0  5.0 - 8.0 Final    Specific Gravity, UA 08/18/2024 1.006  1.005 - 1.030 Final    Glucose, UA 08/18/2024 Negative  Negative Final    Ketones, UA 08/18/2024 Negative  Negative Final    Bilirubin, UA 08/18/2024 Negative  Negative Final    Blood, UA 08/18/2024 Negative  Negative Final    Protein, UA 08/18/2024 Negative  Negative Final    Leuk Esterase, UA 08/18/2024 Negative  Negative Final    Nitrite, UA 08/18/2024 Negative  Negative Final    Urobilinogen, UA 08/18/2024 0.2 E.U./dL  0.2 - 1.0 E.U./dL Final    Glucose 08/18/2024 101 (H)  70 - 99 mg/dL Final    Serial Number: [id number]  [id number]   Admission on 08/13/2024, Discharged on 08/13/2024   Component Date Value Ref Range Status    Glucose 08/13/2024 84  65 - 99 mg/dL Final    BUN 08/13/2024 9  8 - 23 mg/dL Final    Creatinine 08/13/2024 0.80  0.57 - 1.00 mg/dL  Final    Sodium 08/13/2024 141  136 - 145 mmol/L Final    Potassium 08/13/2024 4.3  3.5 - 5.2 mmol/L Final    Chloride 08/13/2024 104  98 - 107 mmol/L Final    CO2 08/13/2024 26.4  22.0 - 29.0 mmol/L Final    Calcium 08/13/2024 9.5  8.6 - 10.5 mg/dL Final    Total Protein 08/13/2024 7.5  6.0 - 8.5 g/dL Final    Albumin 08/13/2024 3.6  3.5 - 5.2 g/dL Final    ALT (SGPT) 08/13/2024 13  1 - 33 U/L Final    AST (SGOT) 08/13/2024 24  1 - 32 U/L Final    Alkaline Phosphatase 08/13/2024 100  39 - 117 U/L Final    Total Bilirubin 08/13/2024 0.2  0.0 - 1.2 mg/dL Final    Globulin 08/13/2024 3.9  gm/dL Final    A/G Ratio 08/13/2024 0.9  g/dL Final    BUN/Creatinine Ratio 08/13/2024 11.3  7.0 - 25.0 Final    Anion Gap 08/13/2024 10.6  5.0 - 15.0 mmol/L Final    eGFR 08/13/2024 76.9  >60.0 mL/min/1.73 Final    WBC 08/13/2024 7.92  3.40 - 10.80 10*3/mm3 Final    RBC 08/13/2024 4.25  3.77 - 5.28 10*6/mm3 Final    Hemoglobin 08/13/2024 13.2  12.0 - 15.9 g/dL Final    Hematocrit 08/13/2024 39.4  34.0 - 46.6 % Final    MCV 08/13/2024 92.7  79.0 - 97.0 fL Final    MCH 08/13/2024 31.1  26.6 - 33.0 pg Final    MCHC 08/13/2024 33.5  31.5 - 35.7 g/dL Final    RDW 08/13/2024 13.9  12.3 - 15.4 % Final    RDW-SD 08/13/2024 47.3  37.0 - 54.0 fl Final    MPV 08/13/2024 9.3  6.0 - 12.0 fL Final    Platelets 08/13/2024 264  140 - 450 10*3/mm3 Final    Neutrophil % 08/13/2024 67.5  42.7 - 76.0 % Final    Lymphocyte % 08/13/2024 23.5  19.6 - 45.3 % Final    Monocyte % 08/13/2024 7.8  5.0 - 12.0 % Final    Eosinophil % 08/13/2024 0.3  0.3 - 6.2 % Final    Basophil % 08/13/2024 0.6  0.0 - 1.5 % Final    Immature Grans % 08/13/2024 0.3  0.0 - 0.5 % Final    Neutrophils, Absolute 08/13/2024 5.35  1.70 - 7.00 10*3/mm3 Final    Lymphocytes, Absolute 08/13/2024 1.86  0.70 - 3.10 10*3/mm3 Final    Monocytes, Absolute 08/13/2024 0.62  0.10 - 0.90 10*3/mm3 Final    Eosinophils, Absolute 08/13/2024 0.02  0.00 - 0.40 10*3/mm3 Final    Basophils, Absolute  08/13/2024 0.05  0.00 - 0.20 10*3/mm3 Final    Immature Grans, Absolute 08/13/2024 0.02  0.00 - 0.05 10*3/mm3 Final    nRBC 08/13/2024 0.0  0.0 - 0.2 /100 WBC Final    Ethanol 08/13/2024 <10  0 - 10 mg/dL Final    Ethanol % 08/13/2024 <0.010  % Final    Acetaminophen 08/13/2024 <5.0  0.0 - 30.0 mcg/mL Final    Salicylate 08/13/2024 0.4  <=30.0 mg/dL Final    Amphet/Methamphet, Screen 08/13/2024 Negative  Negative Final    Barbiturates Screen, Urine 08/13/2024 Negative  Negative Final    Benzodiazepine Screen, Urine 08/13/2024 Positive (A)  Negative Final    Cocaine Screen, Urine 08/13/2024 Negative  Negative Final    Opiate Screen 08/13/2024 Negative  Negative Final    THC, Screen, Urine 08/13/2024 Negative  Negative Final    Methadone Screen, Urine 08/13/2024 Negative  Negative Final    Oxycodone Screen, Urine 08/13/2024 Negative  Negative Final    Fentanyl, Urine 08/13/2024 Negative  Negative Final    Magnesium 08/13/2024 2.4  1.6 - 2.4 mg/dL Final    Vitamin B-12 08/13/2024 748  211 - 946 pg/mL Final    QT Interval 08/13/2024 392  ms Final    QTC Interval 08/13/2024 437  ms Final    TSH 08/13/2024 2.040  0.270 - 4.200 uIU/mL Final    COVID19 08/13/2024 Not Detected  Not Detected - Ref. Range Final    Influenza A PCR 08/13/2024 Not Detected  Not Detected Final    Influenza B PCR 08/13/2024 Not Detected  Not Detected Final    RSV, PCR 08/13/2024 Not Detected  Not Detected Final    Color, UA 08/13/2024 Yellow  Yellow, Straw Final    Appearance, UA 08/13/2024 Clear  Clear Final    pH, UA 08/13/2024 8.0  5.0 - 8.0 Final    Specific Gravity, UA 08/13/2024 1.008  1.005 - 1.030 Final    Glucose, UA 08/13/2024 Negative  Negative Final    Ketones, UA 08/13/2024 Negative  Negative Final    Bilirubin, UA 08/13/2024 Negative  Negative Final    Blood, UA 08/13/2024 Negative  Negative Final    Protein, UA 08/13/2024 Negative  Negative Final    Leuk Esterase, UA 08/13/2024 Negative  Negative Final    Nitrite, UA 08/13/2024  Negative  Negative Final    Urobilinogen, UA 08/13/2024 0.2 E.U./dL  0.2 - 1.0 E.U./dL Final   Hospital Outpatient Visit on 08/09/2024   Component Date Value Ref Range Status    QT Interval 08/09/2024 386  ms Final    QTC Interval 08/09/2024 454  ms Final   Lab on 08/09/2024   Component Date Value Ref Range Status    Glucose 08/09/2024 130 (H)  65 - 99 mg/dL Final    BUN 08/09/2024 11  8 - 23 mg/dL Final    Creatinine 08/09/2024 0.97  0.57 - 1.00 mg/dL Final    Sodium 08/09/2024 141  136 - 145 mmol/L Final    Potassium 08/09/2024 4.2  3.5 - 5.2 mmol/L Final    Chloride 08/09/2024 100  98 - 107 mmol/L Final    CO2 08/09/2024 30.0 (H)  22.0 - 29.0 mmol/L Final    Calcium 08/09/2024 10.2  8.6 - 10.5 mg/dL Final    Total Protein 08/09/2024 8.1  6.0 - 8.5 g/dL Final    Albumin 08/09/2024 4.1  3.5 - 5.2 g/dL Final    ALT (SGPT) 08/09/2024 21  1 - 33 U/L Final    AST (SGOT) 08/09/2024 35 (H)  1 - 32 U/L Final    Alkaline Phosphatase 08/09/2024 126 (H)  39 - 117 U/L Final    Total Bilirubin 08/09/2024 0.3  0.0 - 1.2 mg/dL Final    Globulin 08/09/2024 4.0  gm/dL Final    A/G Ratio 08/09/2024 1.0  g/dL Final    BUN/Creatinine Ratio 08/09/2024 11.3  7.0 - 25.0 Final    Anion Gap 08/09/2024 11.0  5.0 - 15.0 mmol/L Final    eGFR 08/09/2024 61.1  >60.0 mL/min/1.73 Final    AFB Culture 08/10/2024 No AFB isolated at 6 weeks   Final    AFB Stain 08/10/2024 No acid fast bacilli seen on direct smear   Final    AFB Stain 08/10/2024 No acid fast bacilli seen on concentrated smear   Final    WBC 08/09/2024 9.56  3.40 - 10.80 10*3/mm3 Final    RBC 08/09/2024 4.54  3.77 - 5.28 10*6/mm3 Final    Hemoglobin 08/09/2024 13.7  12.0 - 15.9 g/dL Final    Hematocrit 08/09/2024 43.2  34.0 - 46.6 % Final    MCV 08/09/2024 95.2  79.0 - 97.0 fL Final    MCH 08/09/2024 30.2  26.6 - 33.0 pg Final    MCHC 08/09/2024 31.7  31.5 - 35.7 g/dL Final    RDW 08/09/2024 12.3  12.3 - 15.4 % Final    RDW-SD 08/09/2024 42.4  37.0 - 54.0 fl Final    MPV 08/09/2024  9.8  6.0 - 12.0 fL Final    Platelets 08/09/2024 330  140 - 450 10*3/mm3 Final    Neutrophil % 08/09/2024 61.4  42.7 - 76.0 % Final    Lymphocyte % 08/09/2024 30.8  19.6 - 45.3 % Final    Monocyte % 08/09/2024 7.1  5.0 - 12.0 % Final    Eosinophil % 08/09/2024 0.2 (L)  0.3 - 6.2 % Final    Basophil % 08/09/2024 0.3  0.0 - 1.5 % Final    Immature Grans % 08/09/2024 0.2  0.0 - 0.5 % Final    Neutrophils, Absolute 08/09/2024 5.87  1.70 - 7.00 10*3/mm3 Final    Lymphocytes, Absolute 08/09/2024 2.94  0.70 - 3.10 10*3/mm3 Final    Monocytes, Absolute 08/09/2024 0.68  0.10 - 0.90 10*3/mm3 Final    Eosinophils, Absolute 08/09/2024 0.02  0.00 - 0.40 10*3/mm3 Final    Basophils, Absolute 08/09/2024 0.03  0.00 - 0.20 10*3/mm3 Final    Immature Grans, Absolute 08/09/2024 0.02  0.00 - 0.05 10*3/mm3 Final    nRBC 08/09/2024 0.0  0.0 - 0.2 /100 WBC Final   Lab on 07/10/2024   Component Date Value Ref Range Status    Glucose 07/10/2024 86  65 - 99 mg/dL Final    BUN 07/10/2024 15  8 - 23 mg/dL Final    Creatinine 07/10/2024 0.89  0.57 - 1.00 mg/dL Final    Sodium 07/10/2024 142  136 - 145 mmol/L Final    Potassium 07/10/2024 4.2  3.5 - 5.2 mmol/L Final    Chloride 07/10/2024 101  98 - 107 mmol/L Final    CO2 07/10/2024 27.4  22.0 - 29.0 mmol/L Final    Calcium 07/10/2024 9.8  8.6 - 10.5 mg/dL Final    Total Protein 07/10/2024 8.4  6.0 - 8.5 g/dL Final    Albumin 07/10/2024 4.2  3.5 - 5.2 g/dL Final    ALT (SGPT) 07/10/2024 20  1 - 33 U/L Final    AST (SGOT) 07/10/2024 35 (H)  1 - 32 U/L Final    Alkaline Phosphatase 07/10/2024 112  39 - 117 U/L Final    Total Bilirubin 07/10/2024 0.2  0.0 - 1.2 mg/dL Final    Globulin 07/10/2024 4.2  gm/dL Final    A/G Ratio 07/10/2024 1.0  g/dL Final    BUN/Creatinine Ratio 07/10/2024 16.9  7.0 - 25.0 Final    Anion Gap 07/10/2024 13.6  5.0 - 15.0 mmol/L Final    eGFR 07/10/2024 67.7  >60.0 mL/min/1.73 Final    Blood Culture 07/10/2024 No growth at 5 days   Final    Blood Culture 07/10/2024 No  growth at 5 days   Final    WBC 07/10/2024 6.51  3.40 - 10.80 10*3/mm3 Final    RBC 07/10/2024 4.78  3.77 - 5.28 10*6/mm3 Final    Hemoglobin 07/10/2024 14.8  12.0 - 15.9 g/dL Final    Hematocrit 07/10/2024 45.9  34.0 - 46.6 % Final    MCV 07/10/2024 96.0  79.0 - 97.0 fL Final    MCH 07/10/2024 31.0  26.6 - 33.0 pg Final    MCHC 07/10/2024 32.2  31.5 - 35.7 g/dL Final    RDW 07/10/2024 12.1 (L)  12.3 - 15.4 % Final    RDW-SD 07/10/2024 42.9  37.0 - 54.0 fl Final    MPV 07/10/2024 10.3  6.0 - 12.0 fL Final    Platelets 07/10/2024 330  140 - 450 10*3/mm3 Final    Neutrophil % 07/10/2024 51.6  42.7 - 76.0 % Final    Lymphocyte % 07/10/2024 38.9  19.6 - 45.3 % Final    Monocyte % 07/10/2024 7.4  5.0 - 12.0 % Final    Eosinophil % 07/10/2024 1.1  0.3 - 6.2 % Final    Basophil % 07/10/2024 0.8  0.0 - 1.5 % Final    Immature Grans % 07/10/2024 0.2  0.0 - 0.5 % Final    Neutrophils, Absolute 07/10/2024 3.37  1.70 - 7.00 10*3/mm3 Final    Lymphocytes, Absolute 07/10/2024 2.53  0.70 - 3.10 10*3/mm3 Final    Monocytes, Absolute 07/10/2024 0.48  0.10 - 0.90 10*3/mm3 Final    Eosinophils, Absolute 07/10/2024 0.07  0.00 - 0.40 10*3/mm3 Final    Basophils, Absolute 07/10/2024 0.05  0.00 - 0.20 10*3/mm3 Final    Immature Grans, Absolute 07/10/2024 0.01  0.00 - 0.05 10*3/mm3 Final    nRBC 07/10/2024 0.0  0.0 - 0.2 /100 WBC Final   Hospital Outpatient Visit on 07/09/2024   Component Date Value Ref Range Status    EF(MOD-bp) 07/09/2024 60.2  % Final    LVIDd 07/09/2024 3.2  cm Final    LVIDs 07/09/2024 2.16  cm Final    IVSd 07/09/2024 0.68  cm Final    LVPWd 07/09/2024 0.91  cm Final    FS 07/09/2024 32.9  % Final    IVS/LVPW 07/09/2024 0.74  cm Final    ESV(cubed) 07/09/2024 10.1  ml Final    EDV(cubed) 07/09/2024 33.4  ml Final    LV mass(C)d 07/09/2024 65.0  grams Final    LVOT area 07/09/2024 2.27  cm2 Final    LVOT diam 07/09/2024 1.70  cm Final    EDV(MOD-sp2) 07/09/2024 48.1  ml Final    EDV(MOD-sp4) 07/09/2024 46.1  ml Final     ESV(MOD-sp2) 07/09/2024 19.1  ml Final    ESV(MOD-sp4) 07/09/2024 17.0  ml Final    SV(MOD-sp2) 07/09/2024 29.0  ml Final    SV(MOD-sp4) 07/09/2024 29.1  ml Final    EF(MOD-sp2) 07/09/2024 60.3  % Final    EF(MOD-sp4) 07/09/2024 63.1  % Final    MV E max chacorta 07/09/2024 92.4  cm/sec Final    MV A max chacorta 07/09/2024 101.0  cm/sec Final    MV dec time 07/09/2024 0.32  sec Final    MV E/A 07/09/2024 0.91   Final    Pulm A Revs Dur 07/09/2024 0.12  sec Final    LA ESV Index (BP) 07/09/2024 12.0  ml/m2 Final    Med Peak E' Chacorta 07/09/2024 9.6  cm/sec Final    Lat Peak E' Chacorta 07/09/2024 11.8  cm/sec Final    TR max chacorta 07/09/2024 289.0  cm/sec Final    Avg E/e' ratio 07/09/2024 8.64   Final    SV(LVOT) 07/09/2024 57.4  ml Final    SV(RVOT) 07/09/2024 30.2  ml Final    Qp/Qs 07/09/2024 0.53   Final    RVIDd 07/09/2024 2.08  cm Final    TAPSE (>1.6) 07/09/2024 1.77  cm Final    RV S' 07/09/2024 15.4  cm/sec Final    LA dimension (2D)  07/09/2024 2.30  cm Final    Pulm Sys Chacorta 07/09/2024 91.9  cm/sec Final    Pulm Gannon Chacorta 07/09/2024 60.7  cm/sec Final    Pulm S/D 07/09/2024 1.51   Final    Pulm A Revs Chacorta 07/09/2024 27.9  cm/sec Final    LV V1 max 07/09/2024 138.0  cm/sec Final    LV V1 max PG 07/09/2024 7.6  mmHg Final    LV V1 mean PG 07/09/2024 2.00  mmHg Final    LV V1 VTI 07/09/2024 25.3  cm Final    Ao pk chacorta 07/09/2024 144.0  cm/sec Final    Ao max PG 07/09/2024 8.3  mmHg Final    Ao mean PG 07/09/2024 4.0  mmHg Final    Ao V2 VTI 07/09/2024 30.2  cm Final    LYNN(I,D) 07/09/2024 1.90  cm2 Final    AI P1/2t 07/09/2024 611.7  msec Final    MV mean PG 07/09/2024 2.00  mmHg Final    MV V2 VTI 07/09/2024 31.9  cm Final    MV P1/2t 07/09/2024 96.0  msec Final    MVA(P1/2t) 07/09/2024 2.29  cm2 Final    MVA(VTI) 07/09/2024 1.80  cm2 Final    MV dec slope 07/09/2024 308.0  cm/sec2 Final    TR max PG 07/09/2024 33.4  mmHg Final    RVSP(TR) 07/09/2024 38.4  mmHg Final    RAP systole 07/09/2024 5.0  mmHg Final    RVOT diam  07/09/2024 1.60  cm Final    RV V1 max PG 07/09/2024 2.00  mmHg Final    RV V1 max 07/09/2024 70.7  cm/sec Final    RV V1 VTI 07/09/2024 15.0  cm Final    PA V2 max 07/09/2024 71.1  cm/sec Final    Ao root diam 07/09/2024 2.6  cm Final    ACS 07/09/2024 1.30  cm Final    IVRT 07/09/2024 67.0  ms Final    Dimensionless Index 07/09/2024 0.84  (DI) Final    Ascending aorta 07/09/2024 2.5  cm Final   Orders Only on 06/27/2024   Component Date Value Ref Range Status    AFB Culture 07/01/2024 No AFB isolated at 6 weeks   Final    AFB Stain 07/01/2024 No acid fast bacilli seen on direct smear   Final    AFB Stain 07/01/2024 No acid fast bacilli seen on concentrated smear   Final    AFB Culture 07/01/2024 Other (Organism type) (DO NOT USE)   Final    Unable to isolate sufficient AFB for identification due to gross contamination Performed by [facility]                   [address]                    [place] [address]     AFB Stain 07/01/2024 No acid fast bacilli seen on direct smear (C)   Final    AFB Stain 07/01/2024 No acid fast bacilli seen on concentrated smear (C)   Final    AFB Stain 07/01/2024 Acid fast bacilli seen on concentrated smear (C)   Final    AFB Culture 07/01/2024 No AFB isolated at 6 weeks   Final    AFB Stain 07/01/2024 No acid fast bacilli seen on direct smear   Final    AFB Stain 07/01/2024 No acid fast bacilli seen on concentrated smear   Final       EKG Results:  ECG 12 Lead    (Results Pending)       Imaging Results:  No Images in the past 120 days found..      Assessment & Plan   Diagnoses and all orders for this visit:    1. 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  Dispense: 90 each; Refill: 1    2. Mixed bipolar II disorder  -     QUEtiapine fumarate ER (SEROquel XR) 50 MG tablet sustained-release 24 hour tablet; Take 1 tablet by  mouth Every Night.  Dispense: 90 each; Refill: 1  -     ECG 12 Lead; Future    3. Generalized anxiety disorder  -     QUEtiapine fumarate ER (SEROquel XR) 50 MG tablet sustained-release 24 hour tablet; Take 1 tablet by mouth Every Night.  Dispense: 90 each; Refill: 1    4. Panic attacks  -     QUEtiapine fumarate ER (SEROquel XR) 50 MG tablet sustained-release 24 hour tablet; Take 1 tablet by mouth Every Night.  Dispense: 90 each; Refill: 1        04/17/2025: Possibly a breakthrough, increase seroquel. Next visit, if continues improving, increase seroquel further and start tapering off of either mirtazapine or trazodone. EKG ordered, this week.    Acknowledged and normalized patient's thoughts, feelings, and concerns. Allowed patient to freely discuss and process issues, such as:  Anxiety and depression regarding family's well-being, her .  Anxiety and depression regarding medical conditions.  ... using Rogerian psychotherapeutic techniques including unconditional positive regard, reflective listening, and demonstrating clear empathy, with the goal of ameliorating symptoms and maintaining, restoring, or improving self-esteem, adaptive skills, and ego or psychological functions (Samra et al. 1991), the long-term goal of which is to develop a better, healthier perspective and help the patient bear their circumstances more easily.  Time (minutes) spent providing supportive psychotherapy: 16  (This time is exclusive to the therapy session and separate from the time spent on activities used to meet the criteria for the E/M service (history, exam, medical decision-making).)  Start: 1:00  Stop: 1:16  Functional status: mild impairment  Treatment plan: Medication management and supportive psychotherapy  Prognosis: good  Progress: insomnia, bipolar mixed -- improving  6w    02/27/2025: This is basically uncharted territory, chronic insomnia for years. Declines admission, sleep study. Increase seroquel but change  formulation to make the transition more tolerable (she didn't tolerate 200 mg of immediate release seroquel).      01/16/2025: Stop olanzapine and increase seroquel.    11/26/2024: Minimal change. On rifampin which reduces the concentration of olanzapine and trazodone. Increase for insomnia.    10/3/2024: Not seen in over a year. Rifampin for presumable CLAY decreases the concentration of both trazodone and olanzapine. Sleeping worse the last 3 mos; was doing better -- but not perfect -- before then. Mixed bipolar, insomnia. Increase olanzapine and trazodone temporarily. Close follow up.      7/31/23: Patient continues to make changes to meds on her own (reduced klonopin to 0.5 mg nightly), but she is finally sleeping. No changes for now.    6/13: Start gabapentin with traz and klonopin for insomnia. Close follow up.     2/14: Discussion that resolved our conflict.  Patient is still not sleeping, and has persistent anxiety.  We discussed the connection between the 2.  We will target insomnia.  Start by switching to Belsomra.  The plan is to try Belsomra plus clonazepam (we cannot discontinue clonazepam quickly as she has been on it chronically and it is a benzodiazepine) to see if she can sleep on this medication regimen.  Stop trazodone at this time.  Likely will resume and later as the patient feels the trazodone helps with anxiety.     10/6: no changes. Now off seroquel and sleeping fairly well.     9/16: Continue downward titration of Seroquel and upward titration of trazodone.  Having some initial insomnia, but this is actually to be expected.  Also expect this to resolve in time.      9/2: Pt wants to get off seroquel for health reasons. Titrate off of it slowly.  Patient and I had a long conversation about not changing her medications on her own without telling me.  She voiced understanding and agreed to proceed.      7/22: Patient agreed to try Prozac.  Will keep us posted.     6/24: Get EKG now. If qtc  reassuring will start low dose prozac and recheck. Pt is fearful of the combo of seroquel and prozac affecting her heart.     3/24: Add trazodone.     2/25: Urged patient to set up sleep study. Stop abilify, hydroxyzine (never started). Stop clonazepam. Continue seroquel 150 mg daily with miralax every few days (constipation), and start ambien.     2/14: Transition from Seroquel to Abilify.  10 mg of Abilify is equal to 100 mg of Seroquel.  To help patient with sleep, start hydroxyzine in the evenings.  Also increase melatonin.     11/15: Increase melatonin and seroquel.     10/29: Seroquel 150 xr helped, but led to severe depression. Switch to Latuda. 20 minutes of supportive psychotherapy 3 wks.    9/16: Patient has significant depression.  Insomnia is fairly controlled on multiple medications.  Mood and insomnia were better when we started Seroquel at the beginning of the year. Patient advised to back down on Klonopin to 0.5 mg nightly.  I will switch the Seroquel formulation to extended release in order to allow me to go up on the dose of the Seroquel to target bipolar hypomania.  Caution advised to the patient regarding sedation, dizziness, falls.  4 weeks.    8/18: now sleeping on klonopin 0.5 mg qhs, seroquel 75 mg qhs, melatonin 3 mg qhs. Continue.  Patient advised that if the above regimen does not work, to add an additional 25 mg of Seroquel after 1 hour, and only to add the Ambien if she is unable to sleep after 1 hour from taking that extra dose of Seroquel.  4 weeks.    8/9: Continued insomnia.  Chronic.  Order sleep study.  Increase Seroquel and start Colace to target constipation.  See back in 4 weeks.  Patient will also start melatonin 3 mg nightly over-the-counter.  No bipolar domingo or hypomania present today; however, it is possible that bipolar could be the reason for her insomnia.  Patient's scores indicate depression and anxiety, both of which will be treated with a higher dose of Seroquel.   Consider switching to Seroquel extended release, which the patient may tolerate better.    6/8: Not sleeping again. Restart ambien. Continue Seroquel.  Continue Klonopin. Consider switching to vraylar. See back in 2 months. Status post mirtazapine, trazodone, and she never tried olanzapine.  She may discontinue Ambien for good.  Psychotherapy is deferred at this time.      Visit Diagnoses:    ICD-10-CM ICD-9-CM   1. Insomnia due to mental condition  F51.05 300.9     327.02   2. Mixed bipolar II disorder  F31.81 296.89   3. Generalized anxiety disorder  F41.1 300.02   4. Panic attacks  F41.0 300.01       PLAN:  Safety: no acute safety concerns.  Risk Assessment: Risk Assessment: Risk of self-harm acutely is low. Risk factors include mood disorder, access to weapons, recent psychosocial stressors (pandemic), family history. Protective factors include no present SI, no history of suicide attempts or self-harm in the past, no AODA, healthcare seeking, future orientation, willingness to engage in care, Gnosticism belief system. Risk of self-harm chronically is also low, but could be further elevated in the event of treatment noncompliance and/or AODA.  Safety: No acute safety concerns.  Medications:   CONTINUE ativan 0.5 mg 1-2 QHS. Risks, benefits, alternatives discussed with patient including GI upset, sedation, dizziness, falls risk. After discussion of these risks and benefits, the patient voiced understanding and agreed to proceed. Vern ordered. UDS ordered. Controlled substances agreement verbally signed.   INCREASE seroquel  to 250 mg. Risks, benefits, alternatives discussed with patient including nausea and vomiting, GI upset, sedation, dizziness, falls, akathisia, hypotension, increased appetite, lowering of seizure threshold, theoretical risk of tardive dyskinesia, extrapyramidal symptoms, movement issues, restless legs syndrome. Use care when operating vehicle, vessel, or machine. After discussion of  these risks and benefits, the patient voiced understanding and agreed to proceed.  CONTINUE trazodone 200 mg nightly. Risks, benefits, side effects discussed with patient including GI upset, sedation, dizziness/falls risk, grogginess the following day, prolongation of the QTc interval.  After discussion of these risks and benefits, the patient voiced understanding and agreed to proceed.    CONTINUE mirtazapine 15 mg qhs. Risks, benefits, alternatives discussed with patient including GI upset, sedation, dizziness with falls risk, increased appetite.  Do not use before operating vehicle, vessel, or machine. After discussion of these risks and benefits, the patient voiced understanding and agreed to proceed.  S/P   olanzapine 5 mg qhs. Not effective 1/2025.  quetiapine 50 mg qhs PRN insomnia. 10/24  gabapentin 600 mg qhs. 10/24  Klonopin 0.5 mg PO QHS PRN anxiety.   seroquel 100 mg nightly. Constip, higher cholesterol  NEVER STARTED belsomra 5 mg nightly.  latuda 40 mg PO QDAY (samples given). No effect  Mirtazapine ineffective for insomnia.  Trazodone ineffective for insomnia (constipation at 100 mg nightly).  ambien ER 12.5 qhs. Somewhat helpful for insomnia in the past.  Doxepin caused constipation.  ambien 10 mg nightly. No particular reason.  Stopped melatonin 15 mg nightly. No reason.  NEVER STARTED prozac 10 mg daily after reassuring EKG (which we have).  Therapy: Deferred.      TREATMENT PLAN/GOALS: Continue supportive psychotherapy efforts and medications as indicated. Treatment and medication options discussed during today's visit. Patient ackowledged and verbally consented to continue with current treatment plan and was educated on the importance of compliance with treatment and follow-up appointments.    MEDICATION ISSUES:  PARTICIA reviewed as expected.  Discussed medication options and treatment plan of prescribed medication as well as the risks, benefits, and side effects including potential falls,  possible impaired driving and metabolic adversities among others. Patient is agreeable to call the office with any worsening of symptoms or onset of side effects. Patient is agreeable to call 911 or go to the nearest ER should he/she begin having SI/HI. No medication side effects or related complaints today.     MEDS ORDERED DURING VISIT:  New Medications Ordered This Visit   Medications    QUEtiapine fumarate ER (SEROquel XR) 50 MG tablet sustained-release 24 hour tablet     Sig: Take 1 tablet by mouth Every Night.     Dispense:  90 each     Refill:  1     Increasing dose from 200 to 200+50 = 250 mg. Thank you for the help. Please call with questions: [phone].       Return in about 6 weeks (around 5/29/2025).         This document has been electronically signed by Susie Moran MD  April 17, 2025 13:17 EDT      Part of this note may be an electronic transcription/translation of spoken language to printed text using the Dragon Dictation System.

## 2025-04-21 ENCOUNTER — TELEPHONE (OUTPATIENT)
Dept: PSYCHIATRY | Facility: CLINIC | Age: 75
End: 2025-04-21
Payer: MEDICARE

## 2025-04-21 NOTE — TELEPHONE ENCOUNTER
PT(PATIENT) VERIFIED      PT(PATIENT) CALLED TO REQUEST AN UPDATE ON HER ECG 12 Lead (2025 13:13) APPOINTMENT     PLEASE ADVISE

## 2025-04-22 NOTE — TELEPHONE ENCOUNTER
Called Pt to update.  Told Pt that she could get it done at the [facility].  Provider put in order on 4/17

## 2025-04-25 ENCOUNTER — TELEPHONE (OUTPATIENT)
Age: 75
End: 2025-04-25

## 2025-04-25 ENCOUNTER — TELEPHONE (OUTPATIENT)
Age: 75
End: 2025-04-25
Payer: MEDICARE

## 2025-04-25 NOTE — TELEPHONE ENCOUNTER
Caller: Manju Mendoza    Relationship: Self    Best call back number: [phone]    What orders are you requesting (i.e. lab or imaging): LABS TO DO WITH PITUITARY GLAND    (SON HAS PITUITARY ISSUES AND HAS THE SAME SYMPTOMS (INSOMNIA, ANXIETY, FATIGUE) THAT SHE HAS HAD OVER THE YEARS)    In what timeframe would the patient need to come in: WILL HAVE DONE WITH OTHER LABS ORDERED    Where will you receive your lab/imaging services:  FACILITY    Additional notes: CONTACT PATIENT TO ADVISE.

## 2025-04-25 NOTE — TELEPHONE ENCOUNTER
Caller: Manju Mendoza    Relationship to patient: Self    Best call back number: [phone]     Patient is needing: PATIENT REQUESTING A CALLBACK TO DISCUSS MEDICATION LIST.    CONTACT PATIENT TO ADVISE.

## 2025-04-30 NOTE — TELEPHONE ENCOUNTER
Pt states that 4/7/2025, she was taken off of Pepcid at night and was told to take Protonix 40mg bid, since then she feels that her acid reflux is worse, she wants to know your thoughts but doesn't want you to order any new medication without talking to her first. She has been seeing Gastro but doesn't have a follow up until Sept 2025. Please advise.

## 2025-05-01 NOTE — TELEPHONE ENCOUNTER
We made the change b/c she was having problems on protonix q AM and pepcid q PM.  Generally protonix bid works better than the above.    Anyhow, tell her to stay on protonix bid but to add back the pepcid every evening.    She should reach out to GI if not improving in a few weeks.

## 2025-05-02 ENCOUNTER — TELEPHONE (OUTPATIENT)
Dept: GASTROENTEROLOGY | Facility: CLINIC | Age: 75
End: 2025-05-02
Payer: MEDICARE

## 2025-05-02 NOTE — TELEPHONE ENCOUNTER
"Patient called and left a voicemail stating that she would like to apeak to someone to discuss her symptoms.      GERD has come back, she told me to take pantoprazole and Famotidine BID. Her PCP advised her to stop taking Pepcid and start taking pantoprazole BID instead. Patient is having diarrhea and worsening reflux symptoms now. Patient has \"rolling\" and pain in her stomach and is having incontinence at night.     Patient is now only taking pantoprazole 40 BID for reflux.   "

## 2025-05-15 ENCOUNTER — OFFICE VISIT (OUTPATIENT)
Dept: PULMONOLOGY | Facility: CLINIC | Age: 75
End: 2025-05-15
Payer: MEDICARE

## 2025-05-15 VITALS
BODY MASS INDEX: 16.95 KG/M2 | RESPIRATION RATE: 18 BRPM | HEIGHT: 61 IN | TEMPERATURE: 98 F | DIASTOLIC BLOOD PRESSURE: 68 MMHG | HEART RATE: 68 BPM | WEIGHT: 89.8 LBS | SYSTOLIC BLOOD PRESSURE: 136 MMHG | OXYGEN SATURATION: 96 %

## 2025-05-15 DIAGNOSIS — A31.0 PULMONARY MYCOBACTERIUM AVIUM COMPLEX (MAC) INFECTION: Primary | ICD-10-CM

## 2025-05-15 DIAGNOSIS — R93.89 ABNORMAL CHEST CT: ICD-10-CM

## 2025-05-15 DIAGNOSIS — Z87.891 FORMER TOBACCO USE: ICD-10-CM

## 2025-05-15 DIAGNOSIS — J43.9 PULMONARY EMPHYSEMA, UNSPECIFIED EMPHYSEMA TYPE: ICD-10-CM

## 2025-05-15 PROCEDURE — 99214 OFFICE O/P EST MOD 30 MIN: CPT | Performed by: STUDENT IN AN ORGANIZED HEALTH CARE EDUCATION/TRAINING PROGRAM

## 2025-05-15 PROCEDURE — 3075F SYST BP GE 130 - 139MM HG: CPT | Performed by: STUDENT IN AN ORGANIZED HEALTH CARE EDUCATION/TRAINING PROGRAM

## 2025-05-15 PROCEDURE — 3078F DIAST BP <80 MM HG: CPT | Performed by: STUDENT IN AN ORGANIZED HEALTH CARE EDUCATION/TRAINING PROGRAM

## 2025-05-15 PROCEDURE — 1159F MED LIST DOCD IN RCRD: CPT | Performed by: STUDENT IN AN ORGANIZED HEALTH CARE EDUCATION/TRAINING PROGRAM

## 2025-05-15 PROCEDURE — 1160F RVW MEDS BY RX/DR IN RCRD: CPT | Performed by: STUDENT IN AN ORGANIZED HEALTH CARE EDUCATION/TRAINING PROGRAM

## 2025-05-15 RX ORDER — ALBUTEROL SULFATE 90 UG/1
2 INHALANT RESPIRATORY (INHALATION) EVERY 4 HOURS PRN
COMMUNITY

## 2025-05-15 NOTE — PROGRESS NOTES
Primary Care Provider  Scar Cisneros MD   Referring Provider  No ref. provider found      Patient Complaint  Follow-up (Mycobacterium avium complex/3-4 month ), Shortness of Breath, and Cough      Subjective          Manju Mendoza presents to [facility]      History of Presenting Illness  Manju Mendoza is a 74 y.o. female COPD, MAC here for follow-up    Doing fair today.   Still taking triple therapy for MAC.   Still has non productive cough  Reports dyspnea on exertion  No fevers, chills, hemoptysis.     Review of Systems  Constitutional:  No fever. No chills. No weakness.  Eyes: No pain, erythema, or discharge. No blurring of vision.  ENT:  No sore throat, URI symptoms.   Cardiovascular:  No chest pain. No palpitations. No lower extremity edema.  Respiratory:  No shortness of breath, +chronic cough, pleuritic chest pain. No hemoptysis. No dyspnea.   GI:  Normal appetite. No nausea, vomiting, diarrhea. No pain. No melena.  Musculoskeletal:  No arthralgias or myalgias.  Neurologic:  No headache. No weakness.    Family History   Problem Relation Age of Onset    Heart disease Mother     Cancer Mother     Colon cancer Mother 76    Heart attack Mother     Osteoporosis Mother     Arthritis Mother     Osteoporosis Father     Arthritis Father     Stroke Sister     Heart disease Sister     Cancer Sister     Diabetes Sister     Osteoporosis Sister     Arthritis Sister     Heart disease Brother     Diabetes Brother     Arthritis Brother     Stroke Brother     Osteoporosis Brother     Depression Other     Anxiety disorder Other         Social History     Socioeconomic History    Marital status:    Tobacco Use    Smoking status: Former     Current packs/day: 0.00     Average packs/day: 0.3 packs/day for 5.0 years (1.3 ttl pk-yrs)     Types: Cigarettes     Start date:      Quit date:      Years since quittin.3    Smokeless tobacco: Never    Vaping Use    Vaping status: Never Used   Substance and Sexual Activity    Alcohol use: Never    Drug use: Never    Sexual activity: Not Currently        Past Medical History:   Diagnosis Date    Allergic rhinitis     Anxiety     Arthritis     Asthma     Cervical pain (neck)     Cervical radiculopathy 01/08/2019    COPD (chronic obstructive pulmonary disease)     Degeneration of lumbar intervertebral disc 01/08/2019    Depression     Hemorrhoid     Hx of degenerative disc disease     Hyperlipidemia     Leg pain     Limb swelling     Low back pain     Memory change 03/19/2018    I WILL PURSUE A MOCA, LABS AND AN MRI OF THE BRAIN. I WILL REQUEST HER RECORDS BE SENT FOR MY REVIEW. PENDING THE RESULTS OF HER MOCA, REFERRAL FOR NEUROPSYCH TESTNIG COULD BE CONSIDERED    Mild episode of recurrent major depressive disorder 06/05/2020    Mycobacterium avium complex     Osteoarthritis     Shortness of breath     Shoulder pain 03/21/2014        Immunization History   Administered Date(s) Administered    COVID-19 (PFIZER) Purple Cap Monovalent 05/27/2021, 05/27/2021, 08/24/2021, 08/24/2021    Covid-19 (Pfizer) Gray Cap Monovalent 01/21/2022    Flu Vaccine Split Quad 10/13/2020    Fluzone High-Dose 65+YRS 09/26/2024    Fluzone High-Dose 65+yrs 11/18/2021, 10/05/2022, 10/04/2023    Pneumococcal Conjugate 13-Valent (PCV13) 11/13/2015, 10/13/2020    Pneumococcal Conjugate 20-Valent (PCV20) 06/08/2023       Allergies   Allergen Reactions    Codeine Palpitations    Nsaids GI Intolerance          Current Outpatient Medications:     acetaminophen (TYLENOL) 500 MG tablet, Take 1 tablet by mouth Every 6 (Six) Hours As Needed. (Patient taking differently: Take 1 tablet by mouth Every 6 (Six) Hours As Needed. Patient taking 650 mg), Disp: , Rfl:     albuterol sulfate  (90 Base) MCG/ACT inhaler, Inhale 2 puffs Every 4 (Four) Hours As Needed for Wheezing., Disp: , Rfl:     azithromycin (ZITHROMAX) 250 MG tablet, Take 1 tablet by  "mouth 3 (Three) Times a Week. (Patient taking differently: Take 1 tablet by mouth 3 (Three) Times a Week. SUN, WED, FRI), Disp: 30 tablet, Rfl: 11    ethambutol (MYAMBUTOL) 400 MG tablet, TAKE 1 AND 1/2 TABLET BY MOUTH EVERY DAY, Disp: 30 tablet, Rfl: 9    ferrous sulfate 324 (65 Fe) MG tablet delayed-release EC tablet, Take 1 tablet by mouth Every Other Day., Disp: , Rfl:     lidocaine (LIDODERM) 5 %, Place 1 patch on the skin as directed by provider Daily. Remove & Discard patch within 12 hours or as directed by MD, Disp: 90 each, Rfl: 3    LORazepam (Ativan) 0.5 MG tablet, Take 1 to 2 tablets at bedtime as needed for sleep., Disp: 60 tablet, Rfl: 5    metoprolol succinate XL (Toprol XL) 25 MG 24 hr tablet, Take 0.5 tablets by mouth 2 (Two) Times a Day., Disp: 90 tablet, Rfl: 1    multivitamin with minerals tablet tablet, Take 1 tablet by mouth Daily., Disp: , Rfl:     pantoprazole (PROTONIX) 40 MG EC tablet, Take 1 tablet by mouth 2 (Two) Times a Day., Disp: 180 tablet, Rfl: 3    QUEtiapine fumarate ER (SEROquel XR) 50 MG tablet sustained-release 24 hour tablet, Take 1 tablet by mouth Every Night., Disp: 90 each, Rfl: 1    QUEtiapine XR (SEROquel XR) 200 MG 24 hr tablet, TAKE 1 TABLET BY MOUTH EVERY NIGHT, Disp: 30 tablet, Rfl: 2    rifAMPin (Rifadin) 300 MG capsule, Take 1 capsule by mouth 2 (Two) Times a Day., Disp: 180 capsule, Rfl: 3    traZODone (DESYREL) 100 MG tablet, Take 2 tablets by mouth Every Night., Disp: 60 tablet, Rfl: 5    mirtazapine (REMERON) 15 MG tablet, Take 1 tablet by mouth every night at bedtime. (Patient not taking: Reported on 5/15/2025), Disp: 90 tablet, Rfl: 3     Objective     Vital Signs:   /68 (BP Location: Left arm, Patient Position: Sitting, Cuff Size: Adult)   Pulse 68   Temp 98 °F (36.7 °C) (Tympanic)   Resp 18   Ht 155 cm (61.02\")   Wt 40.7 kg (89 lb 12.8 oz)   SpO2 96% Comment: room air  BMI 16.96 kg/m²     Physical Exam  Vitals:    05/15/25 1330   BP: 136/68 "   Pulse: 68   Resp: 18   Temp: 98 °F (36.7 °C)   SpO2: 96%         General: Alert, NAD  HEENT:  EOMI, no sinus tenderness  Neck:  Supple, no thyromegaly,  no JVD  Lymph: no cervical, supraclavicular lymphadenopathy bilaterally  Chest:  clear to auscultation bilaterally, no wheezing or crackles; no work of breathing noted on room air  CV: RRR, no M/G/R, pulses 2+, equal.  Abd:  Soft, NT, ND, +BS  EXT:  no clubbing, no cyanosis, no edema b/l  Neuro:  A&Ox3, CN grossly intact, no focal deficits  Skin: No rashes or lesions noted       Result Review :   I have personally reviewed patient's labs and images.          Assessment and Plan      Patient Active Problem List   Diagnosis    Panlobular emphysema    Lumbar spondylosis    Mixed hyperlipidemia    Primary insomnia    Memory change    Moderate episode of recurrent major depressive disorder    Lymphocytosis    Well adult exam    Claudication    Medication monitoring encounter    Oropharyngeal dysphagia    Xerostomia    Weight loss    Chronic idiopathic constipation    PVC's (premature ventricular contractions)    Mycobacterium avium complex    Tobacco abuse, in remission    Primary hypertension       Impression and Plan:    MAC infection  Bronchiectasis seen on CT  COPD, FEV1 67% predicted  Abnormal chest CT  Former tobacco user, quit 1987    Continue triple therapy for MAC infection.  Patient will need 12 months of therapy (end 08/2025)  -Azithromycin 250 mg 3 times a week.  -Rifampin 300mg 2 times daily --patient has been only taking this daily due to nausea.   -Ethambutal 600mg daily.    -Chest CT done 1/14/2025 showed areas of improvement/areas of tree-in-bud opacities.  Continue MAC treatment per above.  -Repeat chest CT show slight increase in small left-sided effusion with small pericardial effusion.  There is some bronchiectasis noted.  Similar-appearing areas of tree-in-bud opacity in the mid and lower lung fields.  -Will repeat sputum culture  today.  -Follow-up in August.  Will consider stopping triple therapy at that time.  Will repeat sputum culture at that time as well.    Vaccination status: Patient is up-to-date with her vaccinations at this time    Medications personally reviewed.    Follow Up   No follow-ups on file.  Patient was given instructions and counseling regarding her condition or for health maintenance advice. Please see specific information pulled into the AVS if appropriate.

## 2025-05-19 ENCOUNTER — LAB (OUTPATIENT)
Facility: HOSPITAL | Age: 75
End: 2025-05-19
Payer: MEDICARE

## 2025-05-19 ENCOUNTER — TELEPHONE (OUTPATIENT)
Dept: GASTROENTEROLOGY | Facility: CLINIC | Age: 75
End: 2025-05-19
Payer: MEDICARE

## 2025-05-19 ENCOUNTER — TELEPHONE (OUTPATIENT)
Dept: GASTROENTEROLOGY | Facility: CLINIC | Age: 75
End: 2025-05-19

## 2025-05-19 DIAGNOSIS — A31.0 PULMONARY MYCOBACTERIUM AVIUM COMPLEX (MAC) INFECTION: ICD-10-CM

## 2025-05-19 DIAGNOSIS — Z51.81 MEDICATION MONITORING ENCOUNTER: ICD-10-CM

## 2025-05-19 PROCEDURE — 87116 MYCOBACTERIA CULTURE: CPT

## 2025-05-19 PROCEDURE — 87077 CULTURE AEROBIC IDENTIFY: CPT

## 2025-05-19 PROCEDURE — 87206 SMEAR FLUORESCENT/ACID STAI: CPT

## 2025-05-19 PROCEDURE — 87186 SC STD MICRODIL/AGAR DIL: CPT

## 2025-05-19 PROCEDURE — 87070 CULTURE OTHR SPECIMN AEROBIC: CPT

## 2025-05-19 PROCEDURE — 87205 SMEAR GRAM STAIN: CPT

## 2025-05-19 NOTE — TELEPHONE ENCOUNTER
Hub staff attempted to follow warm transfer process and was unsuccessful     Caller: Manju Mendoza    Relationship to patient: Self    Best call back number: [phone]    Patient is needing: PT STATED SHE HAS BEEN USING A FIBER POWDER MIXED WITH WATER TO HELP WITH HER GOING TO THE BATHROOM HOWEVER SHE IS GETTING CONSTIPATED NOW AND IS HOPING FOR A RECOMMENDATION OF AN ALTERNATIVE AS SHE IS WORRIED THE POWDER IS NO LONGER WORKING. ATTEMPTED TO WT BUT THE CLINICAL LINE IS UNABLE TO BE REACHED.     MARKING AS URGENT AS REQUESTED BY PATIENT.

## 2025-05-19 NOTE — TELEPHONE ENCOUNTER
Hub staff attempted to follow warm transfer process and was unsuccessful     Caller: Manju Mendoza    Relationship to patient: Self    Best call back number: [phone]    Patient is needing: PATIENT CALLED MISSED OFFICE PHONE CALL PLEASE CALL HER BACK          
[phone]

## 2025-05-20 NOTE — TELEPHONE ENCOUNTER
Hub staff attempted to follow warm transfer process and was unsuccessful     Caller: Manju Mendoza    Relationship to patient: Self    Best call back number: [phone]    Patient is needing: PATIENT CALLING FAMILIA BACK. PLEASE ADVISE

## 2025-05-22 ENCOUNTER — HOSPITAL ENCOUNTER (OUTPATIENT)
Facility: HOSPITAL | Age: 75
Discharge: HOME | End: 2025-05-22
Admitting: STUDENT IN AN ORGANIZED HEALTH CARE EDUCATION/TRAINING PROGRAM
Payer: MEDICARE

## 2025-05-22 DIAGNOSIS — J15.1 PNEUMONIA DUE TO PSEUDOMONAS SPECIES, UNSPECIFIED LATERALITY, UNSPECIFIED PART OF LUNG: Primary | ICD-10-CM

## 2025-05-22 DIAGNOSIS — F31.81 MIXED BIPOLAR II DISORDER: ICD-10-CM

## 2025-05-22 LAB
QT INTERVAL: 423 MS
QTC INTERVAL: 444 MS

## 2025-05-22 PROCEDURE — 93005 ELECTROCARDIOGRAM TRACING: CPT | Performed by: STUDENT IN AN ORGANIZED HEALTH CARE EDUCATION/TRAINING PROGRAM

## 2025-05-22 RX ORDER — LEVOFLOXACIN 750 MG/1
750 TABLET, FILM COATED ORAL DAILY
Qty: 7 TABLET | Refills: 0 | Status: SHIPPED | OUTPATIENT
Start: 2025-05-22 | End: 2025-05-29

## 2025-05-23 LAB
BACTERIA SPEC RESP CULT: ABNORMAL
BACTERIA SPEC RESP CULT: ABNORMAL
GRAM STN SPEC: ABNORMAL

## 2025-05-27 ENCOUNTER — RESULTS FOLLOW-UP (OUTPATIENT)
Dept: PULMONOLOGY | Facility: CLINIC | Age: 75
End: 2025-05-27
Payer: MEDICARE

## 2025-05-29 ENCOUNTER — OFFICE VISIT (OUTPATIENT)
Dept: PSYCHIATRY | Facility: CLINIC | Age: 75
End: 2025-05-29
Payer: MEDICARE

## 2025-05-29 VITALS
DIASTOLIC BLOOD PRESSURE: 68 MMHG | HEART RATE: 74 BPM | WEIGHT: 89.4 LBS | BODY MASS INDEX: 16.88 KG/M2 | SYSTOLIC BLOOD PRESSURE: 134 MMHG | HEIGHT: 61 IN | OXYGEN SATURATION: 96 %

## 2025-05-29 DIAGNOSIS — F41.1 GENERALIZED ANXIETY DISORDER: ICD-10-CM

## 2025-05-29 DIAGNOSIS — F51.05 INSOMNIA DUE TO MENTAL CONDITION: Primary | ICD-10-CM

## 2025-05-29 DIAGNOSIS — F31.81 MIXED BIPOLAR II DISORDER: ICD-10-CM

## 2025-05-29 DIAGNOSIS — F41.0 PANIC ATTACKS: ICD-10-CM

## 2025-05-29 RX ORDER — TRAZODONE HYDROCHLORIDE 100 MG/1
200 TABLET ORAL NIGHTLY
Qty: 180 TABLET | Refills: 1 | Status: SHIPPED | OUTPATIENT
Start: 2025-05-29

## 2025-05-29 RX ORDER — QUETIAPINE 200 MG/1
200 TABLET, FILM COATED, EXTENDED RELEASE ORAL NIGHTLY
Qty: 90 TABLET | Refills: 1 | Status: SHIPPED | OUTPATIENT
Start: 2025-05-29

## 2025-05-29 NOTE — TREATMENT PLAN
Anxiety:  4/10 progressing    Goals:  Patient will develop and implement behavioral and cognitive strategies to reduce anxiety and irrational fears, monthly, using Rogerian psychotherapy and CBT where appropriate.  Help patient explore past emotional issues in relation to present anxiety, monthly, until remission of symptoms, using Rogerian psychotherapy and CBT where appropriate.  Help patient develop an awareness of their cognitive and physical responses to anxiety, monthly, until remission of symptoms, using Rogerian psychotherapy and CBT where appropriate.          Depression:  3/10 progressing    Goals:  Patient will demonstrate the ability to initiate new constructive life skills outside of sessions on a consistent basis, monthly, using Rogerian psychotherapy and CBT where appropriate.  Assist patient in setting attainable activities of daily living goals, monthly, using Rogerian psychotherapy and CBT where appropriate.  Provide education about depression, monthly, using Rogerian psychotherapy and CBT where appropriate.  Assist patient in developing healthy coping strategies, monthly, using Rogerian psychotherapy and CBT where appropriate.    Rogerian psychotherapy and CBT will be used to help accomplish the above goals and manage depression and anxiety related to insomnia, medical conditions, family well-being       I have discussed and reviewed this treatment plan with the patient.      Reviewed by Susie Moran MD   05/29/2025

## 2025-05-29 NOTE — PROGRESS NOTES
"Subjective   Manju Mendoza is a 74 y.o. female who presents today for follow up    Chief Complaint: Bipolar 2    History of Present Illness:    Chart review:   2025: pulm  2025: fam med, GI  2025: int med; pulm; pt could not tolerate Seroquel 100 mg, so we cut it down to Seroquel 75 mg.  2025: cards, int med; added seroquel to her regimen and reduced olanzapine.   2024: reassuring B12, folate, TSH, low fT4, abnl cmp c02 29.1; reassuring cbc. Missed her 10/23/24 appnt.  2024: not seen in over a year.  Patient never followed up in 10 days like she was supposed to.  Seen by int med.   Cologuard neg  Swallow study done, functional deficit .    Visits (Below):  \"Manju.\" Her parents called her Shae and she didn't like that.  Refuses to do a sleep study.  Pt likes to eat gum  P4 2025:   Interview:  \"Usually sleep every night some.\"  Up a couple times a night to go to [place]  Still has a hangover feeling during the day  Still on MAC complex abx, has been on it for a year  Stopped mirtazapine on her own, this was not something we discussed  Mood/Depression: depressed mood improved, stable \"I'm not really depressed\"  Anxiety: excessive worrying, improving  Panic attacks: stable  Energy: chronically down  Concentration: baseline low  Insomnia: initial insomnia for 2 hours, some mild maintenance insomnia  Eatin, 88, 87, 89, 87, 86 lbs  Refills: y  Substances: def  Therapy: n  Medication compliant: y  SE: none  No SI HI AVH.      2025:   Interview:  \"Anxiety is better, sleep is a little better.\"  Sleeping 4 hours a night, has initial insomnia  \"I can tell it's helped a little.\"  I feel spaced out all the time  Some nights I sleep some, other nights I don't sleep  No change from 10/2024, when I began seeing her  Declines a sleep study  Declines admission  I feel anxious and depressed also; I get very anxious seeing a doctor.  Mood/Depression: depressed mood " "  Anxiety: excessive worrying   Panic attacks: stable  Energy: down  Concentration: baseline low  Insomnia: unclear, initial insomnia  Eatin, 87, 89, 87, 86 lbs  Refills: y  Substances: def  Therapy: n  Medication compliant: y  SE: none  No SI HI AVH.      2025:   Interview:  \"No better than I was before.\"  I feel spaced out all the time  Some nights I sleep some, other nights I don't sleep  No change from 10/2024, when I began seeing her  Declines a sleep study  Declines admission  I feel anxious and depressed also; I get very anxious seeing a doctor.  Mood/Depression: depressed mood   Anxiety: excessive worrying   Panic attacks: stable  Energy: down  Concentration: baseline low  Insomnia: unclear, initial insomnia  Eatin, 89, 87, 86 lbs  Refills: y  Substances: def  Therapy: n  Medication compliant: y  SE: none  No SI HI AVH.      2025:   Interview:  \"I get to sleep but I'm waking up.\"  My lungs aren't getting better  The antibiotics get in the way of the meds working  Now just terminal insomnia  Mood/Depression: mild depressed mood improving  Anxiety: excessive worrying improving  Panic attacks: stable  Energy: down  Concentration: baseline low  Insomnia: initial and maintenance resolved; now terminal insomnia only at 3 to 4 am.  Eatin, 87, 86 lbs  Refills: y  Substances: def  Therapy: n  Medication compliant: y  SE: none  No SI HI AVH.      2024:   Interview:  \"I'm still not sleeping.\"  Getting sleep, but not enough.   Initial insomnia 3-4 hours some nights, also maintenance insomnia  Still repeating the same pattern: doesn't sleep well for 2 nights, then sleeps well the third night out of exhaustion  Mood/Depression: mild depressed mood  Anxiety: excessive worrying  Panic attacks: stable  Energy: down  Concentration: baseline low  Insomnia: initial and maintenance  Eatin, 86 lbs  Refills: y  Substances: def  Therapy: n  Medication compliant: y  SE: none  No SI HI " JONATAN    ...      Manju Mendoza is a 70 year old /White female who presents to the office today referred by Dustin Pennington DO.     Chart review 1/19/22: Seen December 22. History of chronic anxiety and insomnia. Sleeping better on Ambien 12.5 at night extended release. Also on Klonopin 1 mg at night. Mirtazapine 15 mg at night. Olanzapine 5 mg at night. Trazodone 50 mg at night. June labs: Lipids are elevated, LFTs normal, creatinine 0.79, hematocrit 43, electrolytes normal, TSH 1.44, free T4 1.1, folate is normal, B12 is normal. No head imaging or EKG.       PHQ-9 Depression Screening  Little interest or pleasure in doing things?     Feeling down, depressed, or hopeless?     Trouble falling or staying asleep, or sleeping too much?     Feeling tired or having little energy?     Poor appetite or overeating?     Feeling bad about yourself - or that you are a failure or have let yourself or your family down?     Trouble concentrating on things, such as reading the newspaper or watching television?     Moving or speaking so slowly that other people could have noticed? Or the opposite - being so fidgety or restless that you have been moving around a lot more than usual?     Thoughts that you would be better off dead, or of hurting yourself in some way?     PHQ-9 Total Score     If you checked off any problems, how difficult have these problems made it for you to do your work, take care of things at home, or get along with other people?           1/19 H&P:   Virtual visit via Zoom audio due to the COVID-19 pandemic for 46 minutes. Patient could not start the video. Patient is accepting of and agreeable to appointment. The appointment consisted of the patient and I only. Interview: Patient reports a lack of sleep for several years. She reports it has intensified over the last few months, she is unclear why. Also endorses some anxiety and depressive symptoms. What I noted today was that she was very  "difficult to interrupt, and was tangential, in addition to her insomnia. She denies ever being diagnosed with bipolar disorder, but she was trialed on Depakote and Seroquel in the past. She reports that Seroquel did help her sleep. She does not remember when she was placed on it. She also feels that her mirtazapine is making it harder for her to sleep, and is \"making her angrier,\" which does not surprise me.   Endorses muscle tension, fatigue, poor concentration, restlessness, irritability, insomnia. Now that she is taking Ambien, Klonopin, she is sleeping about 6 hours a night. Also endorses feelings of worthlessness. Also states \"I cry a lot.\"   Denies SI HI AVH. Has access to weapons that are locked away. Psychiatric review of systems is positive for anxiety and depression, possible domingo, negative for psychosis.   ...   Past Psychiatric History:  Began Psychiatric Treatment: When she was 25 years old   Dx: Anxiety, depression, insomnia   Psychiatrist: Dr. Pennington has been taking care of her for 1 year   Therapist: Julian   : Denies   Admissions History: Has been admitted 3 times, the last time was 20 years ago.   Medication Trials: Multiple. Prozac, trazodone, Zoloft, Paxil, Cymbalta which worsened her insomnia, Seroquel helped her sleep. Depakote gave her hallucinations. She has never tried olanzapine or Abilify. She avoids antipsychotics because they cause weight gain.   Self-Harm: Denies   Suicide Attempts: Denies   Substance Abuse History:  Types: Denies all, including illicit   Withdrawl Symptoms: Not applicable   Longest period sober: Not applicable   AA: N/A   Admissions History: Denies   Residential History: Denies   Legal: N/A   Social History:  Marital Status:    Employed: No     Kids: 2 children   House: Lives in a house    Hx: No  history   Family History:  Suicide Attempts: Maternal grandfather committed suicide   Suicide Completions: Maternal grandfather committed " suicide   Substance Use: Patient's daughter polysubstance use   Psychiatric Conditions: Her mom and brother both been on psychiatric medications    depression, psychosis, anxiety: Patient became extremely paranoid after having her daughter, she did not have any treatment. After she had her son, she began to have insomnia   Developmental History:  Born: Deferred   Siblings: Deferred   Childhood: Deferred   High School: Deferred   College: Deferred     Past Surgical History:  Past Surgical History:   Procedure Laterality Date    APPENDECTOMY      BLADDER REPAIR      CARPAL TUNNEL RELEASE      CHOLECYSTECTOMY      COLONOSCOPY      ENDOSCOPY      2009    ENDOSCOPY N/A 2024    Procedure: ESOPHAGOGASTRODUODENOSCOPY WITH BIOPSIES, BALLOON DILATATION 15-18;  Surgeon: Shawna Fournier MD;  Location: [facility] ENDOSCOPY;  Service: Gastroenterology;  Laterality: N/A;  ESPOHAGITIS AND GASTRITIS, ESOPHAGEAL STRICTURE    GALLBLADDER SURGERY      HYSTERECTOMY      OTHER SURGICAL HISTORY      METAL IMPLANTS    OTHER SURGICAL HISTORY      SURGICAL CLIPS    SHOULDER SURGERY      TONSILLECTOMY      UPPER GASTROINTESTINAL ENDOSCOPY         Problem List:  Patient Active Problem List   Diagnosis    Pulmonary emphysema    Lumbar spondylosis    Mixed hyperlipidemia    Primary insomnia    Memory change    Moderate episode of recurrent major depressive disorder    Lymphocytosis    Well adult exam    Claudication    Medication monitoring encounter    Oropharyngeal dysphagia    Xerostomia    Weight loss    Chronic idiopathic constipation    PVC's (premature ventricular contractions)    Abnormal chest CT    Pulmonary Mycobacterium avium complex (MAC) infection    Tobacco abuse, in remission    Primary hypertension    Former tobacco use       Allergy:   Allergies   Allergen Reactions    Codeine Palpitations    Nsaids GI Intolerance        Discontinued Medications:  Medications Discontinued During This  Encounter   Medication Reason    mirtazapine (REMERON) 15 MG tablet Non-compliance    traZODone (DESYREL) 100 MG tablet Reorder    QUEtiapine XR (SEROquel XR) 200 MG 24 hr tablet Reorder                   Current Medications:   Current Outpatient Medications   Medication Sig Dispense Refill    acetaminophen (TYLENOL) 500 MG tablet Take 1 tablet by mouth Every 6 (Six) Hours As Needed. (Patient taking differently: Take 1 tablet by mouth Every 6 (Six) Hours As Needed. Patient taking 650 mg)      albuterol sulfate  (90 Base) MCG/ACT inhaler Inhale 2 puffs Every 4 (Four) Hours As Needed for Wheezing.      azithromycin (ZITHROMAX) 250 MG tablet Take 1 tablet by mouth 3 (Three) Times a Week. (Patient taking differently: Take 1 tablet by mouth 3 (Three) Times a Week. SUN, WED, FRI) 30 tablet 11    ethambutol (MYAMBUTOL) 400 MG tablet TAKE 1 AND 1/2 TABLET BY MOUTH EVERY DAY 30 tablet 9    ferrous sulfate 324 (65 Fe) MG tablet delayed-release EC tablet Take 1 tablet by mouth Every Other Day.      lidocaine (LIDODERM) 5 % Place 1 patch on the skin as directed by provider Daily. Remove & Discard patch within 12 hours or as directed by MD 90 each 3    LORazepam (Ativan) 0.5 MG tablet Take 1 to 2 tablets at bedtime as needed for sleep. 60 tablet 5    metoprolol succinate XL (Toprol XL) 25 MG 24 hr tablet Take 0.5 tablets by mouth 2 (Two) Times a Day. 90 tablet 1    multivitamin with minerals tablet tablet Take 1 tablet by mouth Daily.      pantoprazole (PROTONIX) 40 MG EC tablet Take 1 tablet by mouth 2 (Two) Times a Day. 180 tablet 3    QUEtiapine fumarate ER (SEROquel XR) 50 MG tablet sustained-release 24 hour tablet Take 1 tablet by mouth Every Night. 90 each 1    QUEtiapine XR (SEROquel XR) 200 MG 24 hr tablet Take 1 tablet by mouth Every Night. 90 tablet 1    rifAMPin (Rifadin) 300 MG capsule Take 1 capsule by mouth 2 (Two) Times a Day. 180 capsule 3    traZODone (DESYREL) 100 MG tablet Take 2 tablets by mouth Every  "Night. 180 tablet 1    levoFLOXacin (Levaquin) 750 MG tablet Take 1 tablet by mouth Daily for 7 days. (Patient not taking: Reported on 5/29/2025) 7 tablet 0     No current facility-administered medications for this visit.       Past Medical History:  Past Medical History:   Diagnosis Date    Allergic rhinitis     Anxiety     Arthritis     Asthma     Cervical pain (neck)     Cervical radiculopathy 01/08/2019    COPD (chronic obstructive pulmonary disease)     Degeneration of lumbar intervertebral disc 01/08/2019    Depression     Hemorrhoid     Hx of degenerative disc disease     Hyperlipidemia     Leg pain     Limb swelling     Low back pain     Memory change 03/19/2018    I WILL PURSUE A MOCA, LABS AND AN MRI OF THE BRAIN. I WILL REQUEST HER RECORDS BE SENT FOR MY REVIEW. PENDING THE RESULTS OF HER MOCA, REFERRAL FOR NEUROPSYCH TESTNIG COULD BE CONSIDERED    Mild episode of recurrent major depressive disorder 06/05/2020    Mycobacterium avium complex     Osteoarthritis     Shortness of breath     Shoulder pain 03/21/2014         Mental Status Exam:   Hygiene:   good, wearing a mask, a little disheveled  Cooperation:  Cooperative  Eye Contact:  Good  Psychomotor Behavior:  Appropriate  Affect: euthymic, mood congruent, fair variability  Mood: \"I can sleep but I wake early\"  Hopelessness: Denies  Speech:  Normal  Thought Process:  Linear  Thought Content:  Normal  Suicidal:  None  Homicidal:  None  Hallucinations:  None  Delusion:  None  Memory:  Intact  Orientation:  Person, Place, Time and Situation  Reliability:  fair  Insight:  Fair  Judgement:  Fair  Impulse Control:  Fair  Physical/Medical Issues:  No      Review of Systems:  Review of Systems   Constitutional:  Positive for activity change and fatigue.   HENT:  Positive for congestion, sore throat and trouble swallowing.    Eyes:  Positive for visual disturbance.   Respiratory:  Positive for cough and shortness of breath.    Cardiovascular:  Positive for chest " "pain and palpitations.   Endocrine: Positive for cold intolerance and heat intolerance.   Genitourinary:  Positive for difficulty urinating and frequency.   Musculoskeletal:  Positive for arthralgias, gait problem and neck pain.   Allergic/Immunologic: Positive for immunocompromised state.   Neurological:  Positive for dizziness, weakness, light-headedness and numbness.         Physical Exam:  Physical Exam    Vital Signs:   /68   Pulse 74   Ht 154.9 cm (61\")   Wt 40.6 kg (89 lb 6.4 oz)   SpO2 96%   BMI 16.89 kg/m²      Lab Results:   Hospital Outpatient Visit on 05/22/2025   Component Date Value Ref Range Status    QT Interval 05/22/2025 423  ms Preliminary    QTC Interval 05/22/2025 444  ms Preliminary   Lab on 05/19/2025   Component Date Value Ref Range Status    Respiratory Culture 05/19/2025 Moderate growth (3+) Pseudomonas aeruginosa (A)   Final    Respiratory Culture 05/19/2025 Moderate growth (3+) Normal Respiratory Gala   Final    Gram Stain 05/19/2025 Many (4+) WBCs per low power field   Final    Gram Stain 05/19/2025 Rare (1+) Epithelial cells per low power field   Final    Gram Stain 05/19/2025 Moderate (3+) Gram negative bacilli   Final    Gram Stain 05/19/2025 Few (2+) Gram positive cocci in pairs and chains   Final    AFB Culture 05/19/2025 No AFB isolated at 1 week   Preliminary    AFB Stain 05/19/2025 No acid fast bacilli seen on direct smear   Preliminary    AFB Stain 05/19/2025 No acid fast bacilli seen on concentrated smear   Preliminary   Lab on 11/20/2024   Component Date Value Ref Range Status    Vitamin B-12 11/20/2024 692  211 - 946 pg/mL Final    Folate 11/20/2024 >20.00  4.78 - 24.20 ng/mL Final    TSH 11/20/2024 1.480  0.270 - 4.200 uIU/mL Final    Free T4 11/20/2024 0.90 (L)  0.92 - 1.68 ng/dL Final    Glucose 11/20/2024 84  65 - 99 mg/dL Final    BUN 11/20/2024 10  8 - 23 mg/dL Final    Creatinine 11/20/2024 0.76  0.57 - 1.00 mg/dL Final    Sodium 11/20/2024 142  136 - 145 " mmol/L Final    Potassium 11/20/2024 4.0  3.5 - 5.2 mmol/L Final    Chloride 11/20/2024 102  98 - 107 mmol/L Final    CO2 11/20/2024 29.1 (H)  22.0 - 29.0 mmol/L Final    Calcium 11/20/2024 9.5  8.6 - 10.5 mg/dL Final    Total Protein 11/20/2024 7.5  6.0 - 8.5 g/dL Final    Albumin 11/20/2024 3.8  3.5 - 5.2 g/dL Final    ALT (SGPT) 11/20/2024 15  1 - 33 U/L Final    AST (SGOT) 11/20/2024 25  1 - 32 U/L Final    Alkaline Phosphatase 11/20/2024 112  39 - 117 U/L Final    Total Bilirubin 11/20/2024 0.2  0.0 - 1.2 mg/dL Final    Globulin 11/20/2024 3.7  gm/dL Final    A/G Ratio 11/20/2024 1.0  g/dL Final    BUN/Creatinine Ratio 11/20/2024 13.2  7.0 - 25.0 Final    Anion Gap 11/20/2024 10.9  5.0 - 15.0 mmol/L Final    eGFR 11/20/2024 81.8  >60.0 mL/min/1.73 Final    WBC 11/20/2024 6.54  3.40 - 10.80 10*3/mm3 Final    RBC 11/20/2024 4.12  3.77 - 5.28 10*6/mm3 Final    Hemoglobin 11/20/2024 13.4  12.0 - 15.9 g/dL Final    Hematocrit 11/20/2024 39.3  34.0 - 46.6 % Final    MCV 11/20/2024 95.4  79.0 - 97.0 fL Final    MCH 11/20/2024 32.5  26.6 - 33.0 pg Final    MCHC 11/20/2024 34.1  31.5 - 35.7 g/dL Final    RDW 11/20/2024 13.2  12.3 - 15.4 % Final    RDW-SD 11/20/2024 45.9  37.0 - 54.0 fl Final    MPV 11/20/2024 10.4  6.0 - 12.0 fL Final    Platelets 11/20/2024 269  140 - 450 10*3/mm3 Final    Neutrophil % 11/20/2024 44.9  42.7 - 76.0 % Final    Lymphocyte % 11/20/2024 41.9  19.6 - 45.3 % Final    Monocyte % 11/20/2024 11.3  5.0 - 12.0 % Final    Eosinophil % 11/20/2024 0.9  0.3 - 6.2 % Final    Basophil % 11/20/2024 0.8  0.0 - 1.5 % Final    Immature Grans % 11/20/2024 0.2  0.0 - 0.5 % Final    Neutrophils, Absolute 11/20/2024 2.94  1.70 - 7.00 10*3/mm3 Final    Lymphocytes, Absolute 11/20/2024 2.74  0.70 - 3.10 10*3/mm3 Final    Monocytes, Absolute 11/20/2024 0.74  0.10 - 0.90 10*3/mm3 Final    Eosinophils, Absolute 11/20/2024 0.06  0.00 - 0.40 10*3/mm3 Final    Basophils, Absolute 11/20/2024 0.05  0.00 - 0.20 10*3/mm3  Final    Immature Grans, Absolute 11/20/2024 0.01  0.00 - 0.05 10*3/mm3 Final    nRBC 11/20/2024 0.0  0.0 - 0.2 /100 WBC Final   Admission on 08/18/2024, Discharged on 08/18/2024   Component Date Value Ref Range Status    QT Interval 08/18/2024 368  ms Final    QTC Interval 08/18/2024 435  ms Final    Glucose 08/18/2024 231 (H)  65 - 99 mg/dL Final    BUN 08/18/2024 14  8 - 23 mg/dL Final    Creatinine 08/18/2024 0.83  0.57 - 1.00 mg/dL Final    Sodium 08/18/2024 134 (L)  136 - 145 mmol/L Final    Potassium 08/18/2024 4.0  3.5 - 5.2 mmol/L Final    Slight hemolysis detected by analyzer. Result may be falsely elevated.    Chloride 08/18/2024 99  98 - 107 mmol/L Final    CO2 08/18/2024 24.4  22.0 - 29.0 mmol/L Final    Calcium 08/18/2024 8.9  8.6 - 10.5 mg/dL Final    Total Protein 08/18/2024 7.2  6.0 - 8.5 g/dL Final    Albumin 08/18/2024 3.5  3.5 - 5.2 g/dL Final    ALT (SGPT) 08/18/2024 15  1 - 33 U/L Final    AST (SGOT) 08/18/2024 26  1 - 32 U/L Final    Alkaline Phosphatase 08/18/2024 81  39 - 117 U/L Final    Total Bilirubin 08/18/2024 0.2  0.0 - 1.2 mg/dL Final    Globulin 08/18/2024 3.7  gm/dL Final    A/G Ratio 08/18/2024 0.9  g/dL Final    BUN/Creatinine Ratio 08/18/2024 16.9  7.0 - 25.0 Final    Anion Gap 08/18/2024 10.6  5.0 - 15.0 mmol/L Final    eGFR 08/18/2024 73.6  >60.0 mL/min/1.73 Final    Ethanol 08/18/2024 <10  0 - 10 mg/dL Final    Ethanol % 08/18/2024 <0.010  % Final    Amphet/Methamphet, Screen 08/18/2024 Negative  Negative Final    Barbiturates Screen, Urine 08/18/2024 Negative  Negative Final    Benzodiazepine Screen, Urine 08/18/2024 Positive (A)  Negative Final    Cocaine Screen, Urine 08/18/2024 Negative  Negative Final    Opiate Screen 08/18/2024 Negative  Negative Final    THC, Screen, Urine 08/18/2024 Negative  Negative Final    Methadone Screen, Urine 08/18/2024 Negative  Negative Final    Oxycodone Screen, Urine 08/18/2024 Negative  Negative Final    Fentanyl, Urine 08/18/2024 Negative   Negative Final    Acetaminophen 08/18/2024 <5.0  0.0 - 30.0 mcg/mL Final    Salicylate 08/18/2024 <0.3  <=30.0 mg/dL Final    TSH 08/18/2024 1.940  0.270 - 4.200 uIU/mL Final    Free T4 08/18/2024 1.19  0.92 - 1.68 ng/dL Final    HCG Quantitative 08/18/2024 2.71  mIU/mL Final    Extra Tube 08/18/2024 Hold for add-ons.   Final    Auto resulted.    Extra Tube 08/18/2024 hold for add-on   Final    Auto resulted    Extra Tube 08/18/2024 Hold for add-ons.   Final    Auto resulted.    Extra Tube 08/18/2024 Hold for add-ons.   Final    Auto resulted    WBC 08/18/2024 6.92  3.40 - 10.80 10*3/mm3 Final    RBC 08/18/2024 3.98  3.77 - 5.28 10*6/mm3 Final    Hemoglobin 08/18/2024 12.3  12.0 - 15.9 g/dL Final    Hematocrit 08/18/2024 36.6  34.0 - 46.6 % Final    MCV 08/18/2024 92.0  79.0 - 97.0 fL Final    MCH 08/18/2024 30.9  26.6 - 33.0 pg Final    MCHC 08/18/2024 33.6  31.5 - 35.7 g/dL Final    RDW 08/18/2024 14.1  12.3 - 15.4 % Final    RDW-SD 08/18/2024 47.6  37.0 - 54.0 fl Final    MPV 08/18/2024 9.7  6.0 - 12.0 fL Final    Platelets 08/18/2024 255  140 - 450 10*3/mm3 Final    Neutrophil % 08/18/2024 69.3  42.7 - 76.0 % Final    Lymphocyte % 08/18/2024 24.3  19.6 - 45.3 % Final    Monocyte % 08/18/2024 5.1  5.0 - 12.0 % Final    Eosinophil % 08/18/2024 0.3  0.3 - 6.2 % Final    Basophil % 08/18/2024 0.7  0.0 - 1.5 % Final    Immature Grans % 08/18/2024 0.3  0.0 - 0.5 % Final    Neutrophils, Absolute 08/18/2024 4.80  1.70 - 7.00 10*3/mm3 Final    Lymphocytes, Absolute 08/18/2024 1.68  0.70 - 3.10 10*3/mm3 Final    Monocytes, Absolute 08/18/2024 0.35  0.10 - 0.90 10*3/mm3 Final    Eosinophils, Absolute 08/18/2024 0.02  0.00 - 0.40 10*3/mm3 Final    Basophils, Absolute 08/18/2024 0.05  0.00 - 0.20 10*3/mm3 Final    Immature Grans, Absolute 08/18/2024 0.02  0.00 - 0.05 10*3/mm3 Final    nRBC 08/18/2024 0.0  0.0 - 0.2 /100 WBC Final    COVID19 08/18/2024 Not Detected  Not Detected - Ref. Range Final    Influenza A PCR  08/18/2024 Not Detected  Not Detected Final    Influenza B PCR 08/18/2024 Not Detected  Not Detected Final    RSV, PCR 08/18/2024 Not Detected  Not Detected Final    Color, UA 08/18/2024 Yellow  Yellow, Straw Final    Appearance, UA 08/18/2024 Clear  Clear Final    pH, UA 08/18/2024 7.0  5.0 - 8.0 Final    Specific Gravity, UA 08/18/2024 1.006  1.005 - 1.030 Final    Glucose, UA 08/18/2024 Negative  Negative Final    Ketones, UA 08/18/2024 Negative  Negative Final    Bilirubin, UA 08/18/2024 Negative  Negative Final    Blood, UA 08/18/2024 Negative  Negative Final    Protein, UA 08/18/2024 Negative  Negative Final    Leuk Esterase, UA 08/18/2024 Negative  Negative Final    Nitrite, UA 08/18/2024 Negative  Negative Final    Urobilinogen, UA 08/18/2024 0.2 E.U./dL  0.2 - 1.0 E.U./dL Final    Glucose 08/18/2024 101 (H)  70 - 99 mg/dL Final    Serial Number: [id number]:  [id number]   Admission on 08/13/2024, Discharged on 08/13/2024   Component Date Value Ref Range Status    Glucose 08/13/2024 84  65 - 99 mg/dL Final    BUN 08/13/2024 9  8 - 23 mg/dL Final    Creatinine 08/13/2024 0.80  0.57 - 1.00 mg/dL Final    Sodium 08/13/2024 141  136 - 145 mmol/L Final    Potassium 08/13/2024 4.3  3.5 - 5.2 mmol/L Final    Chloride 08/13/2024 104  98 - 107 mmol/L Final    CO2 08/13/2024 26.4  22.0 - 29.0 mmol/L Final    Calcium 08/13/2024 9.5  8.6 - 10.5 mg/dL Final    Total Protein 08/13/2024 7.5  6.0 - 8.5 g/dL Final    Albumin 08/13/2024 3.6  3.5 - 5.2 g/dL Final    ALT (SGPT) 08/13/2024 13  1 - 33 U/L Final    AST (SGOT) 08/13/2024 24  1 - 32 U/L Final    Alkaline Phosphatase 08/13/2024 100  39 - 117 U/L Final    Total Bilirubin 08/13/2024 0.2  0.0 - 1.2 mg/dL Final    Globulin 08/13/2024 3.9  gm/dL Final    A/G Ratio 08/13/2024 0.9  g/dL Final    BUN/Creatinine Ratio 08/13/2024 11.3  7.0 - 25.0 Final    Anion Gap 08/13/2024 10.6  5.0 - 15.0 mmol/L Final    eGFR 08/13/2024 76.9  >60.0 mL/min/1.73 Final    WBC 08/13/2024  7.92  3.40 - 10.80 10*3/mm3 Final    RBC 08/13/2024 4.25  3.77 - 5.28 10*6/mm3 Final    Hemoglobin 08/13/2024 13.2  12.0 - 15.9 g/dL Final    Hematocrit 08/13/2024 39.4  34.0 - 46.6 % Final    MCV 08/13/2024 92.7  79.0 - 97.0 fL Final    MCH 08/13/2024 31.1  26.6 - 33.0 pg Final    MCHC 08/13/2024 33.5  31.5 - 35.7 g/dL Final    RDW 08/13/2024 13.9  12.3 - 15.4 % Final    RDW-SD 08/13/2024 47.3  37.0 - 54.0 fl Final    MPV 08/13/2024 9.3  6.0 - 12.0 fL Final    Platelets 08/13/2024 264  140 - 450 10*3/mm3 Final    Neutrophil % 08/13/2024 67.5  42.7 - 76.0 % Final    Lymphocyte % 08/13/2024 23.5  19.6 - 45.3 % Final    Monocyte % 08/13/2024 7.8  5.0 - 12.0 % Final    Eosinophil % 08/13/2024 0.3  0.3 - 6.2 % Final    Basophil % 08/13/2024 0.6  0.0 - 1.5 % Final    Immature Grans % 08/13/2024 0.3  0.0 - 0.5 % Final    Neutrophils, Absolute 08/13/2024 5.35  1.70 - 7.00 10*3/mm3 Final    Lymphocytes, Absolute 08/13/2024 1.86  0.70 - 3.10 10*3/mm3 Final    Monocytes, Absolute 08/13/2024 0.62  0.10 - 0.90 10*3/mm3 Final    Eosinophils, Absolute 08/13/2024 0.02  0.00 - 0.40 10*3/mm3 Final    Basophils, Absolute 08/13/2024 0.05  0.00 - 0.20 10*3/mm3 Final    Immature Grans, Absolute 08/13/2024 0.02  0.00 - 0.05 10*3/mm3 Final    nRBC 08/13/2024 0.0  0.0 - 0.2 /100 WBC Final    Ethanol 08/13/2024 <10  0 - 10 mg/dL Final    Ethanol % 08/13/2024 <0.010  % Final    Acetaminophen 08/13/2024 <5.0  0.0 - 30.0 mcg/mL Final    Salicylate 08/13/2024 0.4  <=30.0 mg/dL Final    Amphet/Methamphet, Screen 08/13/2024 Negative  Negative Final    Barbiturates Screen, Urine 08/13/2024 Negative  Negative Final    Benzodiazepine Screen, Urine 08/13/2024 Positive (A)  Negative Final    Cocaine Screen, Urine 08/13/2024 Negative  Negative Final    Opiate Screen 08/13/2024 Negative  Negative Final    THC, Screen, Urine 08/13/2024 Negative  Negative Final    Methadone Screen, Urine 08/13/2024 Negative  Negative Final    Oxycodone Screen, Urine  08/13/2024 Negative  Negative Final    Fentanyl, Urine 08/13/2024 Negative  Negative Final    Magnesium 08/13/2024 2.4  1.6 - 2.4 mg/dL Final    Vitamin B-12 08/13/2024 748  211 - 946 pg/mL Final    QT Interval 08/13/2024 392  ms Final    QTC Interval 08/13/2024 437  ms Final    TSH 08/13/2024 2.040  0.270 - 4.200 uIU/mL Final    COVID19 08/13/2024 Not Detected  Not Detected - Ref. Range Final    Influenza A PCR 08/13/2024 Not Detected  Not Detected Final    Influenza B PCR 08/13/2024 Not Detected  Not Detected Final    RSV, PCR 08/13/2024 Not Detected  Not Detected Final    Color, UA 08/13/2024 Yellow  Yellow, Straw Final    Appearance, UA 08/13/2024 Clear  Clear Final    pH, UA 08/13/2024 8.0  5.0 - 8.0 Final    Specific Gravity, UA 08/13/2024 1.008  1.005 - 1.030 Final    Glucose, UA 08/13/2024 Negative  Negative Final    Ketones, UA 08/13/2024 Negative  Negative Final    Bilirubin, UA 08/13/2024 Negative  Negative Final    Blood, UA 08/13/2024 Negative  Negative Final    Protein, UA 08/13/2024 Negative  Negative Final    Leuk Esterase, UA 08/13/2024 Negative  Negative Final    Nitrite, UA 08/13/2024 Negative  Negative Final    Urobilinogen, UA 08/13/2024 0.2 E.U./dL  0.2 - 1.0 E.U./dL Final   Hospital Outpatient Visit on 08/09/2024   Component Date Value Ref Range Status    QT Interval 08/09/2024 386  ms Final    QTC Interval 08/09/2024 454  ms Final   Lab on 08/09/2024   Component Date Value Ref Range Status    Glucose 08/09/2024 130 (H)  65 - 99 mg/dL Final    BUN 08/09/2024 11  8 - 23 mg/dL Final    Creatinine 08/09/2024 0.97  0.57 - 1.00 mg/dL Final    Sodium 08/09/2024 141  136 - 145 mmol/L Final    Potassium 08/09/2024 4.2  3.5 - 5.2 mmol/L Final    Chloride 08/09/2024 100  98 - 107 mmol/L Final    CO2 08/09/2024 30.0 (H)  22.0 - 29.0 mmol/L Final    Calcium 08/09/2024 10.2  8.6 - 10.5 mg/dL Final    Total Protein 08/09/2024 8.1  6.0 - 8.5 g/dL Final    Albumin 08/09/2024 4.1  3.5 - 5.2 g/dL Final    ALT  (SGPT) 08/09/2024 21  1 - 33 U/L Final    AST (SGOT) 08/09/2024 35 (H)  1 - 32 U/L Final    Alkaline Phosphatase 08/09/2024 126 (H)  39 - 117 U/L Final    Total Bilirubin 08/09/2024 0.3  0.0 - 1.2 mg/dL Final    Globulin 08/09/2024 4.0  gm/dL Final    A/G Ratio 08/09/2024 1.0  g/dL Final    BUN/Creatinine Ratio 08/09/2024 11.3  7.0 - 25.0 Final    Anion Gap 08/09/2024 11.0  5.0 - 15.0 mmol/L Final    eGFR 08/09/2024 61.1  >60.0 mL/min/1.73 Final    AFB Culture 08/10/2024 No AFB isolated at 6 weeks   Final    AFB Stain 08/10/2024 No acid fast bacilli seen on direct smear   Final    AFB Stain 08/10/2024 No acid fast bacilli seen on concentrated smear   Final    WBC 08/09/2024 9.56  3.40 - 10.80 10*3/mm3 Final    RBC 08/09/2024 4.54  3.77 - 5.28 10*6/mm3 Final    Hemoglobin 08/09/2024 13.7  12.0 - 15.9 g/dL Final    Hematocrit 08/09/2024 43.2  34.0 - 46.6 % Final    MCV 08/09/2024 95.2  79.0 - 97.0 fL Final    MCH 08/09/2024 30.2  26.6 - 33.0 pg Final    MCHC 08/09/2024 31.7  31.5 - 35.7 g/dL Final    RDW 08/09/2024 12.3  12.3 - 15.4 % Final    RDW-SD 08/09/2024 42.4  37.0 - 54.0 fl Final    MPV 08/09/2024 9.8  6.0 - 12.0 fL Final    Platelets 08/09/2024 330  140 - 450 10*3/mm3 Final    Neutrophil % 08/09/2024 61.4  42.7 - 76.0 % Final    Lymphocyte % 08/09/2024 30.8  19.6 - 45.3 % Final    Monocyte % 08/09/2024 7.1  5.0 - 12.0 % Final    Eosinophil % 08/09/2024 0.2 (L)  0.3 - 6.2 % Final    Basophil % 08/09/2024 0.3  0.0 - 1.5 % Final    Immature Grans % 08/09/2024 0.2  0.0 - 0.5 % Final    Neutrophils, Absolute 08/09/2024 5.87  1.70 - 7.00 10*3/mm3 Final    Lymphocytes, Absolute 08/09/2024 2.94  0.70 - 3.10 10*3/mm3 Final    Monocytes, Absolute 08/09/2024 0.68  0.10 - 0.90 10*3/mm3 Final    Eosinophils, Absolute 08/09/2024 0.02  0.00 - 0.40 10*3/mm3 Final    Basophils, Absolute 08/09/2024 0.03  0.00 - 0.20 10*3/mm3 Final    Immature Grans, Absolute 08/09/2024 0.02  0.00 - 0.05 10*3/mm3 Final    nRBC 08/09/2024 0.0   0.0 - 0.2 /100 WBC Final   Lab on 07/10/2024   Component Date Value Ref Range Status    Glucose 07/10/2024 86  65 - 99 mg/dL Final    BUN 07/10/2024 15  8 - 23 mg/dL Final    Creatinine 07/10/2024 0.89  0.57 - 1.00 mg/dL Final    Sodium 07/10/2024 142  136 - 145 mmol/L Final    Potassium 07/10/2024 4.2  3.5 - 5.2 mmol/L Final    Chloride 07/10/2024 101  98 - 107 mmol/L Final    CO2 07/10/2024 27.4  22.0 - 29.0 mmol/L Final    Calcium 07/10/2024 9.8  8.6 - 10.5 mg/dL Final    Total Protein 07/10/2024 8.4  6.0 - 8.5 g/dL Final    Albumin 07/10/2024 4.2  3.5 - 5.2 g/dL Final    ALT (SGPT) 07/10/2024 20  1 - 33 U/L Final    AST (SGOT) 07/10/2024 35 (H)  1 - 32 U/L Final    Alkaline Phosphatase 07/10/2024 112  39 - 117 U/L Final    Total Bilirubin 07/10/2024 0.2  0.0 - 1.2 mg/dL Final    Globulin 07/10/2024 4.2  gm/dL Final    A/G Ratio 07/10/2024 1.0  g/dL Final    BUN/Creatinine Ratio 07/10/2024 16.9  7.0 - 25.0 Final    Anion Gap 07/10/2024 13.6  5.0 - 15.0 mmol/L Final    eGFR 07/10/2024 67.7  >60.0 mL/min/1.73 Final    Blood Culture 07/10/2024 No growth at 5 days   Final    Blood Culture 07/10/2024 No growth at 5 days   Final    WBC 07/10/2024 6.51  3.40 - 10.80 10*3/mm3 Final    RBC 07/10/2024 4.78  3.77 - 5.28 10*6/mm3 Final    Hemoglobin 07/10/2024 14.8  12.0 - 15.9 g/dL Final    Hematocrit 07/10/2024 45.9  34.0 - 46.6 % Final    MCV 07/10/2024 96.0  79.0 - 97.0 fL Final    MCH 07/10/2024 31.0  26.6 - 33.0 pg Final    MCHC 07/10/2024 32.2  31.5 - 35.7 g/dL Final    RDW 07/10/2024 12.1 (L)  12.3 - 15.4 % Final    RDW-SD 07/10/2024 42.9  37.0 - 54.0 fl Final    MPV 07/10/2024 10.3  6.0 - 12.0 fL Final    Platelets 07/10/2024 330  140 - 450 10*3/mm3 Final    Neutrophil % 07/10/2024 51.6  42.7 - 76.0 % Final    Lymphocyte % 07/10/2024 38.9  19.6 - 45.3 % Final    Monocyte % 07/10/2024 7.4  5.0 - 12.0 % Final    Eosinophil % 07/10/2024 1.1  0.3 - 6.2 % Final    Basophil % 07/10/2024 0.8  0.0 - 1.5 % Final    Immature  Grans % 07/10/2024 0.2  0.0 - 0.5 % Final    Neutrophils, Absolute 07/10/2024 3.37  1.70 - 7.00 10*3/mm3 Final    Lymphocytes, Absolute 07/10/2024 2.53  0.70 - 3.10 10*3/mm3 Final    Monocytes, Absolute 07/10/2024 0.48  0.10 - 0.90 10*3/mm3 Final    Eosinophils, Absolute 07/10/2024 0.07  0.00 - 0.40 10*3/mm3 Final    Basophils, Absolute 07/10/2024 0.05  0.00 - 0.20 10*3/mm3 Final    Immature Grans, Absolute 07/10/2024 0.01  0.00 - 0.05 10*3/mm3 Final    nRBC 07/10/2024 0.0  0.0 - 0.2 /100 WBC Final   Hospital Outpatient Visit on 07/09/2024   Component Date Value Ref Range Status    EF(MOD-bp) 07/09/2024 60.2  % Final    LVIDd 07/09/2024 3.2  cm Final    LVIDs 07/09/2024 2.16  cm Final    IVSd 07/09/2024 0.68  cm Final    LVPWd 07/09/2024 0.91  cm Final    FS 07/09/2024 32.9  % Final    IVS/LVPW 07/09/2024 0.74  cm Final    ESV(cubed) 07/09/2024 10.1  ml Final    EDV(cubed) 07/09/2024 33.4  ml Final    LV mass(C)d 07/09/2024 65.0  grams Final    LVOT area 07/09/2024 2.27  cm2 Final    LVOT diam 07/09/2024 1.70  cm Final    EDV(MOD-sp2) 07/09/2024 48.1  ml Final    EDV(MOD-sp4) 07/09/2024 46.1  ml Final    ESV(MOD-sp2) 07/09/2024 19.1  ml Final    ESV(MOD-sp4) 07/09/2024 17.0  ml Final    SV(MOD-sp2) 07/09/2024 29.0  ml Final    SV(MOD-sp4) 07/09/2024 29.1  ml Final    EF(MOD-sp2) 07/09/2024 60.3  % Final    EF(MOD-sp4) 07/09/2024 63.1  % Final    MV E max chacorta 07/09/2024 92.4  cm/sec Final    MV A max chacorta 07/09/2024 101.0  cm/sec Final    MV dec time 07/09/2024 0.32  sec Final    MV E/A 07/09/2024 0.91   Final    Pulm A Revs Dur 07/09/2024 0.12  sec Final    LA ESV Index (BP) 07/09/2024 12.0  ml/m2 Final    Med Peak E' Chacorta 07/09/2024 9.6  cm/sec Final    Lat Peak E' Chacorta 07/09/2024 11.8  cm/sec Final    TR max chacorta 07/09/2024 289.0  cm/sec Final    Avg E/e' ratio 07/09/2024 8.64   Final    SV(LVOT) 07/09/2024 57.4  ml Final    SV(RVOT) 07/09/2024 30.2  ml Final    Qp/Qs 07/09/2024 0.53   Final    RVIDd 07/09/2024 2.08   cm Final    TAPSE (>1.6) 07/09/2024 1.77  cm Final    RV S' 07/09/2024 15.4  cm/sec Final    LA dimension (2D)  07/09/2024 2.30  cm Final    Pulm Sys Chacorta 07/09/2024 91.9  cm/sec Final    Pulm Gannon Chacorta 07/09/2024 60.7  cm/sec Final    Pulm S/D 07/09/2024 1.51   Final    Pulm A Revs Chacorta 07/09/2024 27.9  cm/sec Final    LV V1 max 07/09/2024 138.0  cm/sec Final    LV V1 max PG 07/09/2024 7.6  mmHg Final    LV V1 mean PG 07/09/2024 2.00  mmHg Final    LV V1 VTI 07/09/2024 25.3  cm Final    Ao pk chacorta 07/09/2024 144.0  cm/sec Final    Ao max PG 07/09/2024 8.3  mmHg Final    Ao mean PG 07/09/2024 4.0  mmHg Final    Ao V2 VTI 07/09/2024 30.2  cm Final    LYNN(I,D) 07/09/2024 1.90  cm2 Final    AI P1/2t 07/09/2024 611.7  msec Final    MV mean PG 07/09/2024 2.00  mmHg Final    MV V2 VTI 07/09/2024 31.9  cm Final    MV P1/2t 07/09/2024 96.0  msec Final    MVA(P1/2t) 07/09/2024 2.29  cm2 Final    MVA(VTI) 07/09/2024 1.80  cm2 Final    MV dec slope 07/09/2024 308.0  cm/sec2 Final    TR max PG 07/09/2024 33.4  mmHg Final    RVSP(TR) 07/09/2024 38.4  mmHg Final    RAP systole 07/09/2024 5.0  mmHg Final    RVOT diam 07/09/2024 1.60  cm Final    RV V1 max PG 07/09/2024 2.00  mmHg Final    RV V1 max 07/09/2024 70.7  cm/sec Final    RV V1 VTI 07/09/2024 15.0  cm Final    PA V2 max 07/09/2024 71.1  cm/sec Final    Ao root diam 07/09/2024 2.6  cm Final    ACS 07/09/2024 1.30  cm Final    IVRT 07/09/2024 67.0  ms Final    Dimensionless Index 07/09/2024 0.84  (DI) Final    Ascending aorta 07/09/2024 2.5  cm Final   Orders Only on 06/27/2024   Component Date Value Ref Range Status    AFB Culture 07/01/2024 No AFB isolated at 6 weeks   Final    AFB Stain 07/01/2024 No acid fast bacilli seen on direct smear   Final    AFB Stain 07/01/2024 No acid fast bacilli seen on concentrated smear   Final    AFB Culture 07/01/2024 Other (Organism type) (DO NOT USE)   Final    Unable to isolate sufficient AFB for identification due to gross contamination  Performed by [facility]                   [address] [place] [address]., [address]                    [place] [address]     AFB Stain 07/01/2024 No acid fast bacilli seen on direct smear (C)   Final    AFB Stain 07/01/2024 No acid fast bacilli seen on concentrated smear (C)   Final    AFB Stain 07/01/2024 Acid fast bacilli seen on concentrated smear (C)   Final    AFB Culture 07/01/2024 No AFB isolated at 6 weeks   Final    AFB Stain 07/01/2024 No acid fast bacilli seen on direct smear   Final    AFB Stain 07/01/2024 No acid fast bacilli seen on concentrated smear   Final   There may be more visits with results that are not included.       EKG Results:  No orders to display       Imaging Results:  No Images in the past 120 days found..      Assessment & Plan   Diagnoses and all orders for this visit:    1. Insomnia due to mental condition (Primary)  -     QUEtiapine XR (SEROquel XR) 200 MG 24 hr tablet; Take 1 tablet by mouth Every Night.  Dispense: 90 tablet; Refill: 1  -     traZODone (DESYREL) 100 MG tablet; Take 2 tablets by mouth Every Night.  Dispense: 180 tablet; Refill: 1    2. Mixed bipolar II disorder  -     QUEtiapine XR (SEROquel XR) 200 MG 24 hr tablet; Take 1 tablet by mouth Every Night.  Dispense: 90 tablet; Refill: 1    3. Generalized anxiety disorder  -     QUEtiapine XR (SEROquel XR) 200 MG 24 hr tablet; Take 1 tablet by mouth Every Night.  Dispense: 90 tablet; Refill: 1    4. Panic attacks  -     QUEtiapine XR (SEROquel XR) 200 MG 24 hr tablet; Take 1 tablet by mouth Every Night.  Dispense: 90 tablet; Refill: 1        05/29/2025: Stopped mirtazapine on her own. Also reduced seroquel on her own. Having trouble with initial insomnia. Pt wants to reduce trazodone one day. Altogether much improved, we agreed not to make further changes. Has at least 3 more mos on the MAC.    Acknowledged and normalized patient's thoughts, feelings, and concerns. Allowed patient to freely discuss and process  issues, such as:  Anxiety and depression regarding family's well-being, her .  Anxiety and depression regarding medical conditions.  ... using Rogerian psychotherapeutic techniques including unconditional positive regard, reflective listening, and demonstrating clear empathy, with the goal of ameliorating symptoms and maintaining, restoring, or improving self-esteem, adaptive skills, and ego or psychological functions (Samra et al. 1991), the long-term goal of which is to develop a better, healthier perspective and help the patient bear their circumstances more easily.  Time (minutes) spent providing supportive psychotherapy: 17  (This time is exclusive to the therapy session and separate from the time spent on activities used to meet the criteria for the E/M service (history, exam, medical decision-making).)  Start: 2:14  Stop: 2:31  Functional status: mild impairment  Treatment plan: Medication management and supportive psychotherapy  Prognosis: good  Progress: insomnia, bipolar mixed -- close to goal  2m    04/17/2025: Possibly a breakthrough, increase seroquel. Next visit, if continues improving, increase seroquel further and start tapering off of either mirtazapine or trazodone. EKG ordered, this week.    02/27/2025: This is basically uncharted territory, chronic insomnia for years. Declines admission, sleep study. Increase seroquel but change formulation to make the transition more tolerable (she didn't tolerate 200 mg of immediate release seroquel).      01/16/2025: Stop olanzapine and increase seroquel.    11/26/2024: Minimal change. On rifampin which reduces the concentration of olanzapine and trazodone. Increase for insomnia.    10/3/2024: Not seen in over a year. Rifampin for presumable CLAY decreases the concentration of both trazodone and olanzapine. Sleeping worse the last 3 mos; was doing better -- but not perfect -- before then. Mixed bipolar, insomnia. Increase olanzapine and trazodone  temporarily. Close follow up.      7/31/23: Patient continues to make changes to meds on her own (reduced klonopin to 0.5 mg nightly), but she is finally sleeping. No changes for now.    6/13: Start gabapentin with traz and klonopin for insomnia. Close follow up.     2/14: Discussion that resolved our conflict.  Patient is still not sleeping, and has persistent anxiety.  We discussed the connection between the 2.  We will target insomnia.  Start by switching to Belsomra.  The plan is to try Belsomra plus clonazepam (we cannot discontinue clonazepam quickly as she has been on it chronically and it is a benzodiazepine) to see if she can sleep on this medication regimen.  Stop trazodone at this time.  Likely will resume and later as the patient feels the trazodone helps with anxiety.     10/6: no changes. Now off seroquel and sleeping fairly well.     9/16: Continue downward titration of Seroquel and upward titration of trazodone.  Having some initial insomnia, but this is actually to be expected.  Also expect this to resolve in time.      9/2: Pt wants to get off seroquel for health reasons. Titrate off of it slowly.  Patient and I had a long conversation about not changing her medications on her own without telling me.  She voiced understanding and agreed to proceed.      7/22: Patient agreed to try Prozac.  Will keep us posted.     6/24: Get EKG now. If qtc reassuring will start low dose prozac and recheck. Pt is fearful of the combo of seroquel and prozac affecting her heart.     3/24: Add trazodone.     2/25: Urged patient to set up sleep study. Stop abilify, hydroxyzine (never started). Stop clonazepam. Continue seroquel 150 mg daily with miralax every few days (constipation), and start ambien.     2/14: Transition from Seroquel to Abilify.  10 mg of Abilify is equal to 100 mg of Seroquel.  To help patient with sleep, start hydroxyzine in the evenings.  Also increase melatonin.     11/15: Increase melatonin and  seroquel.     10/29: Seroquel 150 xr helped, but led to severe depression. Switch to Latuda. 20 minutes of supportive psychotherapy 3 wks.    9/16: Patient has significant depression.  Insomnia is fairly controlled on multiple medications.  Mood and insomnia were better when we started Seroquel at the beginning of the year. Patient advised to back down on Klonopin to 0.5 mg nightly.  I will switch the Seroquel formulation to extended release in order to allow me to go up on the dose of the Seroquel to target bipolar hypomania.  Caution advised to the patient regarding sedation, dizziness, falls.  4 weeks.    8/18: now sleeping on klonopin 0.5 mg qhs, seroquel 75 mg qhs, melatonin 3 mg qhs. Continue.  Patient advised that if the above regimen does not work, to add an additional 25 mg of Seroquel after 1 hour, and only to add the Ambien if she is unable to sleep after 1 hour from taking that extra dose of Seroquel.  4 weeks.    8/9: Continued insomnia.  Chronic.  Order sleep study.  Increase Seroquel and start Colace to target constipation.  See back in 4 weeks.  Patient will also start melatonin 3 mg nightly over-the-counter.  No bipolar domingo or hypomania present today; however, it is possible that bipolar could be the reason for her insomnia.  Patient's scores indicate depression and anxiety, both of which will be treated with a higher dose of Seroquel.  Consider switching to Seroquel extended release, which the patient may tolerate better.    6/8: Not sleeping again. Restart ambien. Continue Seroquel.  Continue Klonopin. Consider switching to vraylar. See back in 2 months. Status post mirtazapine, trazodone, and she never tried olanzapine.  She may discontinue Ambien for good.  Psychotherapy is deferred at this time.      Visit Diagnoses:    ICD-10-CM ICD-9-CM   1. Insomnia due to mental condition  F51.05 300.9     327.02   2. Mixed bipolar II disorder  F31.81 296.89   3. Generalized anxiety disorder  F41.1 300.02    4. Panic attacks  F41.0 300.01       PLAN:  Safety: no acute safety concerns.  Risk Assessment: Risk Assessment: Risk of self-harm acutely is low. Risk factors include mood disorder, access to weapons, recent psychosocial stressors (pandemic), family history. Protective factors include no present SI, no history of suicide attempts or self-harm in the past, no AODA, healthcare seeking, future orientation, willingness to engage in care, Gnosticism belief system. Risk of self-harm chronically is also low, but could be further elevated in the event of treatment noncompliance and/or AODA.  Safety: No acute safety concerns.  Medications:   CONTINUE ativan 0.5 mg 1-2 QHS. Risks, benefits, alternatives discussed with patient including GI upset, sedation, dizziness, falls risk. After discussion of these risks and benefits, the patient voiced understanding and agreed to proceed. Vern ordered. UDS ordered. Controlled substances agreement verbally signed.   REDUCED seroquel  to 200 mg. (On her own 5/25) Risks, benefits, alternatives discussed with patient including nausea and vomiting, GI upset, sedation, dizziness, falls, akathisia, hypotension, increased appetite, lowering of seizure threshold, theoretical risk of tardive dyskinesia, extrapyramidal symptoms, movement issues, restless legs syndrome. Use care when operating vehicle, vessel, or machine. After discussion of these risks and benefits, the patient voiced understanding and agreed to proceed.  CONTINUE trazodone 200 mg nightly. Risks, benefits, side effects discussed with patient including GI upset, sedation, dizziness/falls risk, grogginess the following day, prolongation of the QTc interval.  After discussion of these risks and benefits, the patient voiced understanding and agreed to proceed.    STOPPED mirtazapine 15 mg qhs. (On her own, 5/25) Risks, benefits, alternatives discussed with patient including GI upset, sedation, dizziness with falls risk,  increased appetite.  Do not use before operating vehicle, vessel, or machine. After discussion of these risks and benefits, the patient voiced understanding and agreed to proceed.  S/P   olanzapine 5 mg qhs. Not effective 1/2025.  quetiapine 50 mg qhs PRN insomnia. 10/24  gabapentin 600 mg qhs. 10/24  Klonopin 0.5 mg PO QHS PRN anxiety.   seroquel 100 mg nightly. Constip, higher cholesterol  NEVER STARTED belsomra 5 mg nightly.  latuda 40 mg PO QDAY (samples given). No effect  Mirtazapine ineffective for insomnia.  Trazodone ineffective for insomnia (constipation at 100 mg nightly).  ambien ER 12.5 qhs. Somewhat helpful for insomnia in the past.  Doxepin caused constipation.  ambien 10 mg nightly. No particular reason.  Stopped melatonin 15 mg nightly. No reason.  NEVER STARTED prozac 10 mg daily after reassuring EKG (which we have).  Therapy: Deferred.      TREATMENT PLAN/GOALS: Continue supportive psychotherapy efforts and medications as indicated. Treatment and medication options discussed during today's visit. Patient ackowledged and verbally consented to continue with current treatment plan and was educated on the importance of compliance with treatment and follow-up appointments.    MEDICATION ISSUES:  PATRICIA reviewed as expected.  Discussed medication options and treatment plan of prescribed medication as well as the risks, benefits, and side effects including potential falls, possible impaired driving and metabolic adversities among others. Patient is agreeable to call the office with any worsening of symptoms or onset of side effects. Patient is agreeable to call 911 or go to the nearest ER should he/she begin having SI/HI. No medication side effects or related complaints today.     MEDS ORDERED DURING VISIT:  New Medications Ordered This Visit   Medications    QUEtiapine XR (SEROquel XR) 200 MG 24 hr tablet     Sig: Take 1 tablet by mouth Every Night.     Dispense:  90 tablet     Refill:  1     90 day  fills. Thank you for the help. Please call with questions: [phone].    traZODone (DESYREL) 100 MG tablet     Sig: Take 2 tablets by mouth Every Night.     Dispense:  180 tablet     Refill:  1     90 day fills. Thank you for the help. Please call with questions: [phone].       Return in about 2 months (around 7/29/2025).         This document has been electronically signed by Susie Moran MD  May 29, 2025 14:32 EDT      Part of this note may be an electronic transcription/translation of spoken language to printed text using the Dragon Dictation System.

## 2025-06-02 ENCOUNTER — TELEPHONE (OUTPATIENT)
Dept: PULMONOLOGY | Facility: CLINIC | Age: 75
End: 2025-06-02
Payer: MEDICARE

## 2025-06-02 ENCOUNTER — TELEPHONE (OUTPATIENT)
Dept: PULMONOLOGY | Facility: CLINIC | Age: 75
End: 2025-06-02

## 2025-06-02 NOTE — TELEPHONE ENCOUNTER
Name: Manju Mendoza      Relationship: Self      Best Callback Number: [phone]      HUB PROVIDED THE RELAY MESSAGE FROM THE OFFICE      PATIENT: HAS FURTHER QUESTIONS AND WOULD LIKE A CALL BACK AT THE FOLLOWING PHONE [phone]    ADDITIONAL INFORMATION: WANTS TO KNOW WHICH GROWTH THE AFB IS, WANTS TO KNOW IS THAT THE BACTERIAL GROWTH

## 2025-06-08 LAB
QT INTERVAL: 423 MS
QTC INTERVAL: 444 MS

## 2025-06-30 LAB
MYCOBACTERIUM SPEC CULT: NORMAL
NIGHT BLUE STAIN TISS: NORMAL
NIGHT BLUE STAIN TISS: NORMAL

## 2025-07-14 ENCOUNTER — TELEPHONE (OUTPATIENT)
Dept: PSYCHIATRY | Facility: CLINIC | Age: 75
End: 2025-07-14
Payer: MEDICARE

## 2025-07-14 NOTE — TELEPHONE ENCOUNTER
"PT SAYS THAT SHE WAS \"BUG EYED\" AND STAYED AWAKE ALL NIGHT LAST NIGHT.  PT SAID THAT THIS HAPPENED ONCE BEFORE WHEN SHE FIRST STARTED TAKING SEROQUEL.  sHE STATED THAT SHE NEEDED TO SPEAK TO THE PROVIDER ABOUT IT  "

## 2025-07-14 NOTE — TELEPHONE ENCOUNTER
Called pt, answered her questions. She had one bad night and was afraid she needed to change something. Counseled her not to change anything just yet. No SI HI [facility]. Follow for visit in 2 weeks.

## 2025-07-18 DIAGNOSIS — F41.0 PANIC ATTACKS: ICD-10-CM

## 2025-07-18 RX ORDER — LORAZEPAM 0.5 MG/1
0.5-1 TABLET ORAL NIGHTLY PRN
Qty: 60 TABLET | Refills: 0 | Status: SHIPPED | OUTPATIENT
Start: 2025-07-18

## 2025-07-18 NOTE — TELEPHONE ENCOUNTER
CONTROLLED MEDICATION REFILL REQUEST    STATE REGULATION APPT EVERY 3 MONTHS     UDS(URINE DRUG SCREEN) EVERY 6 MONTHS OR UP TO PROVIDER PREFERENCE   (VLADIMIR DODSON & SALUD 1 PER YEAR)     NEW NARC CONSENT EVERY YEAR      MEDICATION: LORazepam (Ativan) 0.5 MG tablet (01/16/2025)      NEXT OFFICE VISIT: Appointment with Susie Moran MD (07/31/2025)     LAST OFFICE VISIT: Office Visit with Susie Moran MD (05/29/2025)     NARC CONSENT: NONE IN [facility]     URINE DRUG SCREEN(STANDING ORDER)   (LVADIMIR MORAN 1 PER YEAR): Urine Drug Screen - Urine, Clean Catch (08/18/2024 10:10)     PROVIDER PLEASE ADVISE

## 2025-07-31 ENCOUNTER — OFFICE VISIT (OUTPATIENT)
Dept: PSYCHIATRY | Facility: CLINIC | Age: 75
End: 2025-07-31
Payer: MEDICARE

## 2025-07-31 VITALS
HEIGHT: 61 IN | WEIGHT: 91.4 LBS | HEART RATE: 70 BPM | DIASTOLIC BLOOD PRESSURE: 64 MMHG | BODY MASS INDEX: 17.26 KG/M2 | SYSTOLIC BLOOD PRESSURE: 141 MMHG

## 2025-07-31 DIAGNOSIS — F41.0 PANIC ATTACKS: Primary | ICD-10-CM

## 2025-07-31 DIAGNOSIS — F51.05 INSOMNIA DUE TO MENTAL CONDITION: ICD-10-CM

## 2025-07-31 DIAGNOSIS — F41.1 GENERALIZED ANXIETY DISORDER: ICD-10-CM

## 2025-07-31 DIAGNOSIS — F31.81 MIXED BIPOLAR II DISORDER: ICD-10-CM

## 2025-07-31 RX ORDER — QUETIAPINE 300 MG/1
300 TABLET, FILM COATED, EXTENDED RELEASE ORAL NIGHTLY
Qty: 90 TABLET | Refills: 1 | Status: SHIPPED | OUTPATIENT
Start: 2025-07-31

## 2025-07-31 NOTE — PROGRESS NOTES
"Subjective   Manju Mendoza is a 75 y.o. female who presents today for follow up    Chief Complaint: Bipolar 2    History of Present Illness:    Chart review:   2025: no visits.  2025: pulm  2025: fam med, GI  2025: int med; pulm; pt could not tolerate Seroquel 100 mg, so we cut it down to Seroquel 75 mg.  2025: cards, int med; added seroquel to her regimen and reduced olanzapine.   2024: reassuring B12, folate, TSH, low fT4, abnl cmp c02 29.1; reassuring cbc. Missed her 10/23/24 appnt.  2024: not seen in over a year.  Patient never followed up in 10 days like she was supposed to.  Seen by int med.   Cologuard neg  Swallow study done, functional deficit .    Visits (Below):  \"Manju.\" Her parents called her Shae and she didn't like that.  Refuses to do a sleep study.  Pt likes to eat gum  Used to be on mirtazapine, stopped on it's own    2025:   Interview:  \"I'm about the same, most nights I sleep.\"  Didn't sleep last night  2-3 nights a week sleeps through the night  Rarely takes ativan  I'm gaining weight, I need to watch it  Still on MAC complex  Again, up 2x/night for BR  Mood/Depression: stable  Anxiety: stable  Panic attacks: stable  Energy: chronically down  Concentration: baseline low  Insomnia: initial and maintenance insomnia  Eatin, 89, 88, 87, 89, 87, 86 lbs  Refills: y  Substances: def  Therapy: n  Medication compliant: y  SE: none  No SI HI AVH.      2025:   Interview:  \"Usually sleep every night some.\"  Up a couple times a night to go to BR  Still has a hangover feeling during the day  Still on MAC complex abx, has been on it for a year  Stopped mirtazapine on her own, this was not something we discussed  Mood/Depression: depressed mood improved, stable \"I'm not really depressed\"  Anxiety: excessive worrying, improving  Panic attacks: stable  Energy: chronically down  Concentration: baseline low  Insomnia: initial insomnia for 2 " "hours, some mild maintenance insomnia  Eatin, 88, 87, 89, 87, 86 lbs  Refills: y  Substances: def  Therapy: n  Medication compliant: y  SE: none  No SI HI AVH.      2025:   Interview:  \"Anxiety is better, sleep is a little better.\"  Sleeping 4 hours a night, has initial insomnia  \"I can tell it's helped a little.\"  I feel spaced out all the time  Some nights I sleep some, other nights I don't sleep  No change from 10/2024, when I began seeing her  Declines a sleep study  Declines admission  I feel anxious and depressed also; I get very anxious seeing a doctor.  Mood/Depression: depressed mood   Anxiety: excessive worrying   Panic attacks: stable  Energy: down  Concentration: baseline low  Insomnia: unclear, initial insomnia  Eatin, 87, 89, 87, 86 lbs  Refills: y  Substances: def  Therapy: n  Medication compliant: y  SE: none  No SI HI AVH.      2025:   Interview:  \"No better than I was before.\"  I feel spaced out all the time  Some nights I sleep some, other nights I don't sleep  No change from 10/2024, when I began seeing her  Declines a sleep study  Declines admission  I feel anxious and depressed also; I get very anxious seeing a doctor.  Mood/Depression: depressed mood   Anxiety: excessive worrying   Panic attacks: stable  Energy: down  Concentration: baseline low  Insomnia: unclear, initial insomnia  Eatin, 89, 87, 86 lbs  Refills: y  Substances: def  Therapy: n  Medication compliant: y  SE: none  No SI HI AVH.      2025:   Interview:  \"I get to sleep but I'm waking up.\"  My lungs aren't getting better  The antibiotics get in the way of the meds working  Now just terminal insomnia  Mood/Depression: mild depressed mood improving  Anxiety: excessive worrying improving  Panic attacks: stable  Energy: down  Concentration: baseline low  Insomnia: initial and maintenance resolved; now terminal insomnia only at 3 to 4 am.  Eatin, 87, 86 lbs  Refills: y  Substances: def  Therapy: " "n  Medication compliant: y  SE: none  No SI HI AVH.      2024:   Interview:  \"I'm still not sleeping.\"  Getting sleep, but not enough.   Initial insomnia 3-4 hours some nights, also maintenance insomnia  Still repeating the same pattern: doesn't sleep well for 2 nights, then sleeps well the third night out of exhaustion  Mood/Depression: mild depressed mood  Anxiety: excessive worrying  Panic attacks: stable  Energy: down  Concentration: baseline low  Insomnia: initial and maintenance  Eatin, 86 lbs  Refills: y  Substances: def  Therapy: n  Medication compliant: y  SE: none  No SI HI AVH.    ...      Manju Mendoza is a 70 year old /White female who presents to the office today referred by Dustin Pennington DO.     Chart review 22: Seen . History of chronic anxiety and insomnia. Sleeping better on Ambien 12.5 at night extended release. Also on Klonopin 1 mg at night. Mirtazapine 15 mg at night. Olanzapine 5 mg at night. Trazodone 50 mg at night.  labs: Lipids are elevated, LFTs normal, creatinine 0.79, hematocrit 43, electrolytes normal, TSH 1.44, free T4 1.1, folate is normal, B12 is normal. No head imaging or EKG.       PHQ-9 Depression Screening  Little interest or pleasure in doing things?     Feeling down, depressed, or hopeless?     Trouble falling or staying asleep, or sleeping too much?     Feeling tired or having little energy?     Poor appetite or overeating?     Feeling bad about yourself - or that you are a failure or have let yourself or your family down?     Trouble concentrating on things, such as reading the newspaper or watching television?     Moving or speaking so slowly that other people could have noticed? Or the opposite - being so fidgety or restless that you have been moving around a lot more than usual?     Thoughts that you would be better off dead, or of hurting yourself in some way?     PHQ-9 Total Score     If you checked off any problems, " "how difficult have these problems made it for you to do your work, take care of things at home, or get along with other people?           1/19 H&P:   Virtual visit via Zoom audio due to the COVID-19 pandemic for 46 minutes. Patient could not start the video. Patient is accepting of and agreeable to appointment. The appointment consisted of the patient and I only. Interview: Patient reports a lack of sleep for several years. She reports it has intensified over the last few months, she is unclear why. Also endorses some anxiety and depressive symptoms. What I noted today was that she was very difficult to interrupt, and was tangential, in addition to her insomnia. She denies ever being diagnosed with bipolar disorder, but she was trialed on Depakote and Seroquel in the past. She reports that Seroquel did help her sleep. She does not remember when she was placed on it. She also feels that her mirtazapine is making it harder for her to sleep, and is \"making her angrier,\" which does not surprise me.   Endorses muscle tension, fatigue, poor concentration, restlessness, irritability, insomnia. Now that she is taking Ambien, Klonopin, she is sleeping about 6 hours a night. Also endorses feelings of worthlessness. Also states \"I cry a lot.\"   Denies SI HI AVH. Has access to weapons that are locked away. Psychiatric review of systems is positive for anxiety and depression, possible domingo, negative for psychosis.   ...   Past Psychiatric History:  Began Psychiatric Treatment: When she was 25 years old   Dx: Anxiety, depression, insomnia   Psychiatrist: Dr. Pennington has been taking care of her for 1 year   Therapist: Julian   : Julian   Admissions History: Has been admitted 3 times, the last time was 20 years ago.   Medication Trials: Multiple. Prozac, trazodone, Zoloft, Paxil, Cymbalta which worsened her insomnia, Seroquel helped her sleep. Depakote gave her hallucinations. She has never tried olanzapine or Abilify. " She avoids antipsychotics because they cause weight gain.   Self-Harm: Denies   Suicide Attempts: Denies   Substance Abuse History:  Types: Denies all, including illicit   Withdrawl Symptoms: Not applicable   Longest period sober: Not applicable   AA: N/A   Admissions History: Denies   Residential History: Denies   Legal: N/A   Social History:  Marital Status:    Employed: No     Kids: 2 children   House: Lives in a house    Hx: No  history   Family History:  Suicide Attempts: Maternal grandfather committed suicide   Suicide Completions: Maternal grandfather committed suicide   Substance Use: Patient's daughter polysubstance use   Psychiatric Conditions: Her mom and brother both been on psychiatric medications    depression, psychosis, anxiety: Patient became extremely paranoid after having her daughter, she did not have any treatment. After she had her son, she began to have insomnia   Developmental History:  Born: Deferred   Siblings: Deferred   Childhood: Deferred   High School: Deferred   College: Deferred     Past Surgical History:  Past Surgical History:   Procedure Laterality Date    APPENDECTOMY      BLADDER REPAIR      CARPAL TUNNEL RELEASE      CHOLECYSTECTOMY      COLONOSCOPY      ENDOSCOPY      2009    ENDOSCOPY N/A 2024    Procedure: ESOPHAGOGASTRODUODENOSCOPY WITH BIOPSIES, BALLOON DILATATION 15-18;  Surgeon: Shawna Fournier MD;  Location: [facility] ENDOSCOPY;  Service: Gastroenterology;  Laterality: N/A;  ESPOHAGITIS AND GASTRITIS, ESOPHAGEAL STRICTURE    GALLBLADDER SURGERY      HYSTERECTOMY      OTHER SURGICAL HISTORY      METAL IMPLANTS    OTHER SURGICAL HISTORY      SURGICAL CLIPS    SHOULDER SURGERY      TONSILLECTOMY      UPPER GASTROINTESTINAL ENDOSCOPY         Problem List:  Patient Active Problem List   Diagnosis    Pulmonary emphysema    Lumbar spondylosis    Mixed hyperlipidemia    Primary insomnia    Memory change    Moderate  episode of recurrent major depressive disorder    Lymphocytosis    Well adult exam    Claudication    Medication monitoring encounter    Oropharyngeal dysphagia    Xerostomia    Weight loss    Chronic idiopathic constipation    PVC's (premature ventricular contractions)    Abnormal chest CT    Pulmonary Mycobacterium avium complex (MAC) infection    Tobacco abuse, in remission    Primary hypertension    Former tobacco use       Allergy:   Allergies   Allergen Reactions    Codeine Palpitations    Nsaids GI Intolerance        Discontinued Medications:  Medications Discontinued During This Encounter   Medication Reason    QUEtiapine fumarate ER (SEROquel XR) 50 MG tablet sustained-release 24 hour tablet     QUEtiapine XR (SEROquel XR) 200 MG 24 hr tablet                      Current Medications:   Current Outpatient Medications   Medication Sig Dispense Refill    acetaminophen (TYLENOL) 500 MG tablet Take 1 tablet by mouth Every 6 (Six) Hours As Needed. (Patient taking differently: Take 1 tablet by mouth Every 6 (Six) Hours As Needed. Patient taking 650 mg)      albuterol sulfate  (90 Base) MCG/ACT inhaler Inhale 2 puffs Every 4 (Four) Hours As Needed for Wheezing.      azithromycin (ZITHROMAX) 250 MG tablet Take 1 tablet by mouth 3 (Three) Times a Week. (Patient taking differently: Take 1 tablet by mouth 3 (Three) Times a Week. SUN, WED, FRI) 30 tablet 11    ethambutol (MYAMBUTOL) 400 MG tablet TAKE 1 AND 1/2 TABLET BY MOUTH EVERY DAY 30 tablet 9    ferrous sulfate 324 (65 Fe) MG tablet delayed-release EC tablet Take 1 tablet by mouth Every Other Day.      lidocaine (LIDODERM) 5 % Place 1 patch on the skin as directed by provider Daily. Remove & Discard patch within 12 hours or as directed by MD 90 each 3    LORazepam (ATIVAN) 0.5 MG tablet TAKE 1 TO 2 TABLETS BY MOUTH AT BEDTIME AS NEEDED FOR SLEEP 60 tablet 0    metoprolol succinate XL (Toprol XL) 25 MG 24 hr tablet Take 0.5 tablets by mouth 2 (Two) Times a  "Day. 90 tablet 1    multivitamin with minerals tablet tablet Take 1 tablet by mouth Daily.      pantoprazole (PROTONIX) 40 MG EC tablet Take 1 tablet by mouth 2 (Two) Times a Day. 180 tablet 3    QUEtiapine XR (SEROquel XR) 300 MG 24 hr tablet Take 1 tablet by mouth Every Night. 90 tablet 1    rifAMPin (Rifadin) 300 MG capsule Take 1 capsule by mouth 2 (Two) Times a Day. 180 capsule 3    traZODone (DESYREL) 100 MG tablet Take 2 tablets by mouth Every Night. 180 tablet 1     No current facility-administered medications for this visit.       Past Medical History:  Past Medical History:   Diagnosis Date    Allergic rhinitis     Anxiety     Arthritis     Asthma     Cervical pain (neck)     Cervical radiculopathy 01/08/2019    COPD (chronic obstructive pulmonary disease)     Degeneration of lumbar intervertebral disc 01/08/2019    Depression     Hemorrhoid     Hx of degenerative disc disease     Hyperlipidemia     Leg pain     Limb swelling     Low back pain     Memory change 03/19/2018    I WILL PURSUE A MOCA, LABS AND AN MRI OF THE BRAIN. I WILL REQUEST HER RECORDS BE SENT FOR MY REVIEW. PENDING THE RESULTS OF HER MOCA, REFERRAL FOR NEUROPSYCH TESTNIG COULD BE CONSIDERED    Mild episode of recurrent major depressive disorder 06/05/2020    Mycobacterium avium complex     Osteoarthritis     Shortness of breath     Shoulder pain 03/21/2014         Mental Status Exam:   Hygiene:   good, wearing a mask, a little disheveled  Cooperation:  Cooperative  Eye Contact:  Good  Psychomotor Behavior:  Appropriate  Affect: mild constriction, mood congruent, fair variability  Mood: \"Well about the same\"  Hopelessness: Denies  Speech:  Normal  Thought Process:  Linear  Thought Content:  Normal  Suicidal:  None  Homicidal:  None  Hallucinations:  None  Delusion:  None  Memory:  Intact  Orientation:  Person, Place, Time and Situation  Reliability:  fair  Insight:  Fair  Judgement:  Fair  Impulse Control:  Fair  Physical/Medical Issues:  " "No      Review of Systems:  Review of Systems   Constitutional:  Positive for activity change and fatigue.   HENT:  Positive for congestion and sore throat. Negative for trouble swallowing.    Eyes:  Positive for visual disturbance.   Respiratory:  Positive for cough and shortness of breath.    Cardiovascular:  Positive for chest pain and palpitations.   Endocrine: Positive for cold intolerance and heat intolerance.   Genitourinary:  Positive for frequency. Negative for difficulty urinating.   Musculoskeletal:  Positive for arthralgias, gait problem and neck pain.   Allergic/Immunologic: Positive for immunocompromised state.   Neurological:  Positive for dizziness, weakness, light-headedness and numbness.   Psychiatric/Behavioral:  The patient is nervous/anxious.          Physical Exam:  Physical Exam    Vital Signs:   /64   Pulse 70   Ht 154.9 cm (61\")   Wt 41.5 kg (91 lb 6.4 oz)   BMI 17.27 kg/m²      Lab Results:   Hospital Outpatient Visit on 05/22/2025   Component Date Value Ref Range Status    QT Interval 05/22/2025 423  ms Final    QTC Interval 05/22/2025 444  ms Final   Lab on 05/19/2025   Component Date Value Ref Range Status    Respiratory Culture 05/19/2025 Moderate growth (3+) Pseudomonas aeruginosa (A)   Final    Respiratory Culture 05/19/2025 Moderate growth (3+) Normal Respiratory Gala   Final    Gram Stain 05/19/2025 Many (4+) WBCs per low power field   Final    Gram Stain 05/19/2025 Rare (1+) Epithelial cells per low power field   Final    Gram Stain 05/19/2025 Moderate (3+) Gram negative bacilli   Final    Gram Stain 05/19/2025 Few (2+) Gram positive cocci in pairs and chains   Final    AFB Culture 05/19/2025 No AFB isolated at 6 weeks   Final    AFB Stain 05/19/2025 No acid fast bacilli seen on direct smear   Final    AFB Stain 05/19/2025 No acid fast bacilli seen on concentrated smear   Final   Lab on 11/20/2024   Component Date Value Ref Range Status    Vitamin B-12 11/20/2024 692  " 211 - 946 pg/mL Final    Folate 11/20/2024 >20.00  4.78 - 24.20 ng/mL Final    TSH 11/20/2024 1.480  0.270 - 4.200 uIU/mL Final    Free T4 11/20/2024 0.90 (L)  0.92 - 1.68 ng/dL Final    Glucose 11/20/2024 84  65 - 99 mg/dL Final    BUN 11/20/2024 10  8 - 23 mg/dL Final    Creatinine 11/20/2024 0.76  0.57 - 1.00 mg/dL Final    Sodium 11/20/2024 142  136 - 145 mmol/L Final    Potassium 11/20/2024 4.0  3.5 - 5.2 mmol/L Final    Chloride 11/20/2024 102  98 - 107 mmol/L Final    CO2 11/20/2024 29.1 (H)  22.0 - 29.0 mmol/L Final    Calcium 11/20/2024 9.5  8.6 - 10.5 mg/dL Final    Total Protein 11/20/2024 7.5  6.0 - 8.5 g/dL Final    Albumin 11/20/2024 3.8  3.5 - 5.2 g/dL Final    ALT (SGPT) 11/20/2024 15  1 - 33 U/L Final    AST (SGOT) 11/20/2024 25  1 - 32 U/L Final    Alkaline Phosphatase 11/20/2024 112  39 - 117 U/L Final    Total Bilirubin 11/20/2024 0.2  0.0 - 1.2 mg/dL Final    Globulin 11/20/2024 3.7  gm/dL Final    A/G Ratio 11/20/2024 1.0  g/dL Final    BUN/Creatinine Ratio 11/20/2024 13.2  7.0 - 25.0 Final    Anion Gap 11/20/2024 10.9  5.0 - 15.0 mmol/L Final    eGFR 11/20/2024 81.8  >60.0 mL/min/1.73 Final    WBC 11/20/2024 6.54  3.40 - 10.80 10*3/mm3 Final    RBC 11/20/2024 4.12  3.77 - 5.28 10*6/mm3 Final    Hemoglobin 11/20/2024 13.4  12.0 - 15.9 g/dL Final    Hematocrit 11/20/2024 39.3  34.0 - 46.6 % Final    MCV 11/20/2024 95.4  79.0 - 97.0 fL Final    MCH 11/20/2024 32.5  26.6 - 33.0 pg Final    MCHC 11/20/2024 34.1  31.5 - 35.7 g/dL Final    RDW 11/20/2024 13.2  12.3 - 15.4 % Final    RDW-SD 11/20/2024 45.9  37.0 - 54.0 fl Final    MPV 11/20/2024 10.4  6.0 - 12.0 fL Final    Platelets 11/20/2024 269  140 - 450 10*3/mm3 Final    Neutrophil % 11/20/2024 44.9  42.7 - 76.0 % Final    Lymphocyte % 11/20/2024 41.9  19.6 - 45.3 % Final    Monocyte % 11/20/2024 11.3  5.0 - 12.0 % Final    Eosinophil % 11/20/2024 0.9  0.3 - 6.2 % Final    Basophil % 11/20/2024 0.8  0.0 - 1.5 % Final    Immature Grans %  11/20/2024 0.2  0.0 - 0.5 % Final    Neutrophils, Absolute 11/20/2024 2.94  1.70 - 7.00 10*3/mm3 Final    Lymphocytes, Absolute 11/20/2024 2.74  0.70 - 3.10 10*3/mm3 Final    Monocytes, Absolute 11/20/2024 0.74  0.10 - 0.90 10*3/mm3 Final    Eosinophils, Absolute 11/20/2024 0.06  0.00 - 0.40 10*3/mm3 Final    Basophils, Absolute 11/20/2024 0.05  0.00 - 0.20 10*3/mm3 Final    Immature Grans, Absolute 11/20/2024 0.01  0.00 - 0.05 10*3/mm3 Final    nRBC 11/20/2024 0.0  0.0 - 0.2 /100 WBC Final   Admission on 08/18/2024, Discharged on 08/18/2024   Component Date Value Ref Range Status    QT Interval 08/18/2024 368  ms Final    QTC Interval 08/18/2024 435  ms Final    Glucose 08/18/2024 231 (H)  65 - 99 mg/dL Final    BUN 08/18/2024 14  8 - 23 mg/dL Final    Creatinine 08/18/2024 0.83  0.57 - 1.00 mg/dL Final    Sodium 08/18/2024 134 (L)  136 - 145 mmol/L Final    Potassium 08/18/2024 4.0  3.5 - 5.2 mmol/L Final    Slight hemolysis detected by analyzer. Result may be falsely elevated.    Chloride 08/18/2024 99  98 - 107 mmol/L Final    CO2 08/18/2024 24.4  22.0 - 29.0 mmol/L Final    Calcium 08/18/2024 8.9  8.6 - 10.5 mg/dL Final    Total Protein 08/18/2024 7.2  6.0 - 8.5 g/dL Final    Albumin 08/18/2024 3.5  3.5 - 5.2 g/dL Final    ALT (SGPT) 08/18/2024 15  1 - 33 U/L Final    AST (SGOT) 08/18/2024 26  1 - 32 U/L Final    Alkaline Phosphatase 08/18/2024 81  39 - 117 U/L Final    Total Bilirubin 08/18/2024 0.2  0.0 - 1.2 mg/dL Final    Globulin 08/18/2024 3.7  gm/dL Final    A/G Ratio 08/18/2024 0.9  g/dL Final    BUN/Creatinine Ratio 08/18/2024 16.9  7.0 - 25.0 Final    Anion Gap 08/18/2024 10.6  5.0 - 15.0 mmol/L Final    eGFR 08/18/2024 73.6  >60.0 mL/min/1.73 Final    Ethanol 08/18/2024 <10  0 - 10 mg/dL Final    Ethanol % 08/18/2024 <0.010  % Final    Amphet/Methamphet, Screen 08/18/2024 Negative  Negative Final    Barbiturates Screen, Urine 08/18/2024 Negative  Negative Final    Benzodiazepine Screen, Urine  08/18/2024 Positive (A)  Negative Final    Cocaine Screen, Urine 08/18/2024 Negative  Negative Final    Opiate Screen 08/18/2024 Negative  Negative Final    THC, Screen, Urine 08/18/2024 Negative  Negative Final    Methadone Screen, Urine 08/18/2024 Negative  Negative Final    Oxycodone Screen, Urine 08/18/2024 Negative  Negative Final    Fentanyl, Urine 08/18/2024 Negative  Negative Final    Acetaminophen 08/18/2024 <5.0  0.0 - 30.0 mcg/mL Final    Salicylate 08/18/2024 <0.3  <=30.0 mg/dL Final    TSH 08/18/2024 1.940  0.270 - 4.200 uIU/mL Final    Free T4 08/18/2024 1.19  0.92 - 1.68 ng/dL Final    HCG Quantitative 08/18/2024 2.71  mIU/mL Final    Extra Tube 08/18/2024 Hold for add-ons.   Final    Auto resulted.    Extra Tube 08/18/2024 hold for add-on   Final    Auto resulted    Extra Tube 08/18/2024 Hold for add-ons.   Final    Auto resulted.    Extra Tube 08/18/2024 Hold for add-ons.   Final    Auto resulted    WBC 08/18/2024 6.92  3.40 - 10.80 10*3/mm3 Final    RBC 08/18/2024 3.98  3.77 - 5.28 10*6/mm3 Final    Hemoglobin 08/18/2024 12.3  12.0 - 15.9 g/dL Final    Hematocrit 08/18/2024 36.6  34.0 - 46.6 % Final    MCV 08/18/2024 92.0  79.0 - 97.0 fL Final    MCH 08/18/2024 30.9  26.6 - 33.0 pg Final    MCHC 08/18/2024 33.6  31.5 - 35.7 g/dL Final    RDW 08/18/2024 14.1  12.3 - 15.4 % Final    RDW-SD 08/18/2024 47.6  37.0 - 54.0 fl Final    MPV 08/18/2024 9.7  6.0 - 12.0 fL Final    Platelets 08/18/2024 255  140 - 450 10*3/mm3 Final    Neutrophil % 08/18/2024 69.3  42.7 - 76.0 % Final    Lymphocyte % 08/18/2024 24.3  19.6 - 45.3 % Final    Monocyte % 08/18/2024 5.1  5.0 - 12.0 % Final    Eosinophil % 08/18/2024 0.3  0.3 - 6.2 % Final    Basophil % 08/18/2024 0.7  0.0 - 1.5 % Final    Immature Grans % 08/18/2024 0.3  0.0 - 0.5 % Final    Neutrophils, Absolute 08/18/2024 4.80  1.70 - 7.00 10*3/mm3 Final    Lymphocytes, Absolute 08/18/2024 1.68  0.70 - 3.10 10*3/mm3 Final    Monocytes, Absolute 08/18/2024 0.35   0.10 - 0.90 10*3/mm3 Final    Eosinophils, Absolute 08/18/2024 0.02  0.00 - 0.40 10*3/mm3 Final    Basophils, Absolute 08/18/2024 0.05  0.00 - 0.20 10*3/mm3 Final    Immature Grans, Absolute 08/18/2024 0.02  0.00 - 0.05 10*3/mm3 Final    nRBC 08/18/2024 0.0  0.0 - 0.2 /100 WBC Final    COVID19 08/18/2024 Not Detected  Not Detected - Ref. Range Final    Influenza A PCR 08/18/2024 Not Detected  Not Detected Final    Influenza B PCR 08/18/2024 Not Detected  Not Detected Final    RSV, PCR 08/18/2024 Not Detected  Not Detected Final    Color, UA 08/18/2024 Yellow  Yellow, Straw Final    Appearance, UA 08/18/2024 Clear  Clear Final    pH, UA 08/18/2024 7.0  5.0 - 8.0 Final    Specific Gravity, UA 08/18/2024 1.006  1.005 - 1.030 Final    Glucose, UA 08/18/2024 Negative  Negative Final    Ketones, UA 08/18/2024 Negative  Negative Final    Bilirubin, UA 08/18/2024 Negative  Negative Final    Blood, UA 08/18/2024 Negative  Negative Final    Protein, UA 08/18/2024 Negative  Negative Final    Leuk Esterase, UA 08/18/2024 Negative  Negative Final    Nitrite, UA 08/18/2024 Negative  Negative Final    Urobilinogen, UA 08/18/2024 0.2 E.U./dL  0.2 - 1.0 E.U./dL Final    Glucose 08/18/2024 101 (H)  70 - 99 mg/dL Final    Serial Number: [id number]:  [id number]   Admission on 08/13/2024, Discharged on 08/13/2024   Component Date Value Ref Range Status    Glucose 08/13/2024 84  65 - 99 mg/dL Final    BUN 08/13/2024 9  8 - 23 mg/dL Final    Creatinine 08/13/2024 0.80  0.57 - 1.00 mg/dL Final    Sodium 08/13/2024 141  136 - 145 mmol/L Final    Potassium 08/13/2024 4.3  3.5 - 5.2 mmol/L Final    Chloride 08/13/2024 104  98 - 107 mmol/L Final    CO2 08/13/2024 26.4  22.0 - 29.0 mmol/L Final    Calcium 08/13/2024 9.5  8.6 - 10.5 mg/dL Final    Total Protein 08/13/2024 7.5  6.0 - 8.5 g/dL Final    Albumin 08/13/2024 3.6  3.5 - 5.2 g/dL Final    ALT (SGPT) 08/13/2024 13  1 - 33 U/L Final    AST (SGOT) 08/13/2024 24  1 - 32 U/L Final     Alkaline Phosphatase 08/13/2024 100  39 - 117 U/L Final    Total Bilirubin 08/13/2024 0.2  0.0 - 1.2 mg/dL Final    Globulin 08/13/2024 3.9  gm/dL Final    A/G Ratio 08/13/2024 0.9  g/dL Final    BUN/Creatinine Ratio 08/13/2024 11.3  7.0 - 25.0 Final    Anion Gap 08/13/2024 10.6  5.0 - 15.0 mmol/L Final    eGFR 08/13/2024 76.9  >60.0 mL/min/1.73 Final    WBC 08/13/2024 7.92  3.40 - 10.80 10*3/mm3 Final    RBC 08/13/2024 4.25  3.77 - 5.28 10*6/mm3 Final    Hemoglobin 08/13/2024 13.2  12.0 - 15.9 g/dL Final    Hematocrit 08/13/2024 39.4  34.0 - 46.6 % Final    MCV 08/13/2024 92.7  79.0 - 97.0 fL Final    MCH 08/13/2024 31.1  26.6 - 33.0 pg Final    MCHC 08/13/2024 33.5  31.5 - 35.7 g/dL Final    RDW 08/13/2024 13.9  12.3 - 15.4 % Final    RDW-SD 08/13/2024 47.3  37.0 - 54.0 fl Final    MPV 08/13/2024 9.3  6.0 - 12.0 fL Final    Platelets 08/13/2024 264  140 - 450 10*3/mm3 Final    Neutrophil % 08/13/2024 67.5  42.7 - 76.0 % Final    Lymphocyte % 08/13/2024 23.5  19.6 - 45.3 % Final    Monocyte % 08/13/2024 7.8  5.0 - 12.0 % Final    Eosinophil % 08/13/2024 0.3  0.3 - 6.2 % Final    Basophil % 08/13/2024 0.6  0.0 - 1.5 % Final    Immature Grans % 08/13/2024 0.3  0.0 - 0.5 % Final    Neutrophils, Absolute 08/13/2024 5.35  1.70 - 7.00 10*3/mm3 Final    Lymphocytes, Absolute 08/13/2024 1.86  0.70 - 3.10 10*3/mm3 Final    Monocytes, Absolute 08/13/2024 0.62  0.10 - 0.90 10*3/mm3 Final    Eosinophils, Absolute 08/13/2024 0.02  0.00 - 0.40 10*3/mm3 Final    Basophils, Absolute 08/13/2024 0.05  0.00 - 0.20 10*3/mm3 Final    Immature Grans, Absolute 08/13/2024 0.02  0.00 - 0.05 10*3/mm3 Final    nRBC 08/13/2024 0.0  0.0 - 0.2 /100 WBC Final    Ethanol 08/13/2024 <10  0 - 10 mg/dL Final    Ethanol % 08/13/2024 <0.010  % Final    Acetaminophen 08/13/2024 <5.0  0.0 - 30.0 mcg/mL Final    Salicylate 08/13/2024 0.4  <=30.0 mg/dL Final    Amphet/Methamphet, Screen 08/13/2024 Negative  Negative Final    Barbiturates Screen, Urine  08/13/2024 Negative  Negative Final    Benzodiazepine Screen, Urine 08/13/2024 Positive (A)  Negative Final    Cocaine Screen, Urine 08/13/2024 Negative  Negative Final    Opiate Screen 08/13/2024 Negative  Negative Final    THC, Screen, Urine 08/13/2024 Negative  Negative Final    Methadone Screen, Urine 08/13/2024 Negative  Negative Final    Oxycodone Screen, Urine 08/13/2024 Negative  Negative Final    Fentanyl, Urine 08/13/2024 Negative  Negative Final    Magnesium 08/13/2024 2.4  1.6 - 2.4 mg/dL Final    Vitamin B-12 08/13/2024 748  211 - 946 pg/mL Final    QT Interval 08/13/2024 392  ms Final    QTC Interval 08/13/2024 437  ms Final    TSH 08/13/2024 2.040  0.270 - 4.200 uIU/mL Final    COVID19 08/13/2024 Not Detected  Not Detected - Ref. Range Final    Influenza A PCR 08/13/2024 Not Detected  Not Detected Final    Influenza B PCR 08/13/2024 Not Detected  Not Detected Final    RSV, PCR 08/13/2024 Not Detected  Not Detected Final    Color, UA 08/13/2024 Yellow  Yellow, Straw Final    Appearance, UA 08/13/2024 Clear  Clear Final    pH, UA 08/13/2024 8.0  5.0 - 8.0 Final    Specific Gravity, UA 08/13/2024 1.008  1.005 - 1.030 Final    Glucose, UA 08/13/2024 Negative  Negative Final    Ketones, UA 08/13/2024 Negative  Negative Final    Bilirubin, UA 08/13/2024 Negative  Negative Final    Blood, UA 08/13/2024 Negative  Negative Final    Protein, UA 08/13/2024 Negative  Negative Final    Leuk Esterase, UA 08/13/2024 Negative  Negative Final    Nitrite, UA 08/13/2024 Negative  Negative Final    Urobilinogen, UA 08/13/2024 0.2 E.U./dL  0.2 - 1.0 E.U./dL Final   Hospital Outpatient Visit on 08/09/2024   Component Date Value Ref Range Status    QT Interval 08/09/2024 386  ms Final    QTC Interval 08/09/2024 454  ms Final   Lab on 08/09/2024   Component Date Value Ref Range Status    Glucose 08/09/2024 130 (H)  65 - 99 mg/dL Final    BUN 08/09/2024 11  8 - 23 mg/dL Final    Creatinine 08/09/2024 0.97  0.57 - 1.00 mg/dL  Final    Sodium 08/09/2024 141  136 - 145 mmol/L Final    Potassium 08/09/2024 4.2  3.5 - 5.2 mmol/L Final    Chloride 08/09/2024 100  98 - 107 mmol/L Final    CO2 08/09/2024 30.0 (H)  22.0 - 29.0 mmol/L Final    Calcium 08/09/2024 10.2  8.6 - 10.5 mg/dL Final    Total Protein 08/09/2024 8.1  6.0 - 8.5 g/dL Final    Albumin 08/09/2024 4.1  3.5 - 5.2 g/dL Final    ALT (SGPT) 08/09/2024 21  1 - 33 U/L Final    AST (SGOT) 08/09/2024 35 (H)  1 - 32 U/L Final    Alkaline Phosphatase 08/09/2024 126 (H)  39 - 117 U/L Final    Total Bilirubin 08/09/2024 0.3  0.0 - 1.2 mg/dL Final    Globulin 08/09/2024 4.0  gm/dL Final    A/G Ratio 08/09/2024 1.0  g/dL Final    BUN/Creatinine Ratio 08/09/2024 11.3  7.0 - 25.0 Final    Anion Gap 08/09/2024 11.0  5.0 - 15.0 mmol/L Final    eGFR 08/09/2024 61.1  >60.0 mL/min/1.73 Final    AFB Culture 08/10/2024 No AFB isolated at 6 weeks   Final    AFB Stain 08/10/2024 No acid fast bacilli seen on direct smear   Final    AFB Stain 08/10/2024 No acid fast bacilli seen on concentrated smear   Final    WBC 08/09/2024 9.56  3.40 - 10.80 10*3/mm3 Final    RBC 08/09/2024 4.54  3.77 - 5.28 10*6/mm3 Final    Hemoglobin 08/09/2024 13.7  12.0 - 15.9 g/dL Final    Hematocrit 08/09/2024 43.2  34.0 - 46.6 % Final    MCV 08/09/2024 95.2  79.0 - 97.0 fL Final    MCH 08/09/2024 30.2  26.6 - 33.0 pg Final    MCHC 08/09/2024 31.7  31.5 - 35.7 g/dL Final    RDW 08/09/2024 12.3  12.3 - 15.4 % Final    RDW-SD 08/09/2024 42.4  37.0 - 54.0 fl Final    MPV 08/09/2024 9.8  6.0 - 12.0 fL Final    Platelets 08/09/2024 330  140 - 450 10*3/mm3 Final    Neutrophil % 08/09/2024 61.4  42.7 - 76.0 % Final    Lymphocyte % 08/09/2024 30.8  19.6 - 45.3 % Final    Monocyte % 08/09/2024 7.1  5.0 - 12.0 % Final    Eosinophil % 08/09/2024 0.2 (L)  0.3 - 6.2 % Final    Basophil % 08/09/2024 0.3  0.0 - 1.5 % Final    Immature Grans % 08/09/2024 0.2  0.0 - 0.5 % Final    Neutrophils, Absolute 08/09/2024 5.87  1.70 - 7.00 10*3/mm3 Final     Lymphocytes, Absolute 08/09/2024 2.94  0.70 - 3.10 10*3/mm3 Final    Monocytes, Absolute 08/09/2024 0.68  0.10 - 0.90 10*3/mm3 Final    Eosinophils, Absolute 08/09/2024 0.02  0.00 - 0.40 10*3/mm3 Final    Basophils, Absolute 08/09/2024 0.03  0.00 - 0.20 10*3/mm3 Final    Immature Grans, Absolute 08/09/2024 0.02  0.00 - 0.05 10*3/mm3 Final    nRBC 08/09/2024 0.0  0.0 - 0.2 /100 WBC Final       EKG Results:  No orders to display       Imaging Results:  No Images in the past 120 days found..      Assessment & Plan   Diagnoses and all orders for this visit:    1. Panic attacks (Primary)  -     QUEtiapine XR (SEROquel XR) 300 MG 24 hr tablet; Take 1 tablet by mouth Every Night.  Dispense: 90 tablet; Refill: 1    2. Insomnia due to mental condition  -     QUEtiapine XR (SEROquel XR) 300 MG 24 hr tablet; Take 1 tablet by mouth Every Night.  Dispense: 90 tablet; Refill: 1    3. Mixed bipolar II disorder  -     QUEtiapine XR (SEROquel XR) 300 MG 24 hr tablet; Take 1 tablet by mouth Every Night.  Dispense: 90 tablet; Refill: 1    4. Generalized anxiety disorder  -     QUEtiapine XR (SEROquel XR) 300 MG 24 hr tablet; Take 1 tablet by mouth Every Night.  Dispense: 90 tablet; Refill: 1        07/31/2025: Increase quetiapine XR. Not worse, but not much better. May get off MAC complex trx soon. Plan long term is to taper off trazodone.    Acknowledged and normalized patient's thoughts, feelings, and concerns. Allowed patient to freely discuss and process issues, such as:  Ongoing: Anxiety regarding taking psychotropic medications.  Anxiety and depression regarding family's well-being, her .  Anxiety and depression regarding medical conditions.  ... using Rogerian psychotherapeutic techniques including unconditional positive regard, reflective listening, and demonstrating clear empathy, with the goal of ameliorating symptoms and maintaining, restoring, or improving self-esteem, adaptive skills, and ego or psychological  functions (Samra et al. 1991), the long-term goal of which is to develop a better, healthier perspective and help the patient bear their circumstances more easily.  Time (minutes) spent providing supportive psychotherapy: 18  (This time is exclusive to the therapy session and separate from the time spent on activities used to meet the criteria for the E/M service (history, exam, medical decision-making).)  Start: 2:19  Stop: 2:37  Functional status: mild impairment  Treatment plan: Medication management and supportive psychotherapy  Prognosis: good  Progress: insomnia, bipolar mixed -- close to goal  5w    05/29/2025: Stopped mirtazapine on her own. Also reduced seroquel on her own. Having trouble with initial insomnia. Pt wants to reduce trazodone one day. Altogether much improved, we agreed not to make further changes. Has at least 3 more mos on the MAC.    04/17/2025: Possibly a breakthrough, increase seroquel. Next visit, if continues improving, increase seroquel further and start tapering off of either mirtazapine or trazodone. EKG ordered, this week.    02/27/2025: This is basically uncharted territory, chronic insomnia for years. Declines admission, sleep study. Increase seroquel but change formulation to make the transition more tolerable (she didn't tolerate 200 mg of immediate release seroquel).      01/16/2025: Stop olanzapine and increase seroquel.    11/26/2024: Minimal change. On rifampin which reduces the concentration of olanzapine and trazodone. Increase for insomnia.    10/3/2024: Not seen in over a year. Rifampin for presumable CLAY decreases the concentration of both trazodone and olanzapine. Sleeping worse the last 3 mos; was doing better -- but not perfect -- before then. Mixed bipolar, insomnia. Increase olanzapine and trazodone temporarily. Close follow up.      7/31/23: Patient continues to make changes to meds on her own (reduced klonopin to 0.5 mg nightly), but she is finally sleeping. No  changes for now.    6/13: Start gabapentin with traz and klonopin for insomnia. Close follow up.     2/14: Discussion that resolved our conflict.  Patient is still not sleeping, and has persistent anxiety.  We discussed the connection between the 2.  We will target insomnia.  Start by switching to Belsomra.  The plan is to try Belsomra plus clonazepam (we cannot discontinue clonazepam quickly as she has been on it chronically and it is a benzodiazepine) to see if she can sleep on this medication regimen.  Stop trazodone at this time.  Likely will resume and later as the patient feels the trazodone helps with anxiety.     10/6: no changes. Now off seroquel and sleeping fairly well.     9/16: Continue downward titration of Seroquel and upward titration of trazodone.  Having some initial insomnia, but this is actually to be expected.  Also expect this to resolve in time.      9/2: Pt wants to get off seroquel for health reasons. Titrate off of it slowly.  Patient and I had a long conversation about not changing her medications on her own without telling me.  She voiced understanding and agreed to proceed.      7/22: Patient agreed to try Prozac.  Will keep us posted.     6/24: Get EKG now. If qtc reassuring will start low dose prozac and recheck. Pt is fearful of the combo of seroquel and prozac affecting her heart.     3/24: Add trazodone.     2/25: Urged patient to set up sleep study. Stop abilify, hydroxyzine (never started). Stop clonazepam. Continue seroquel 150 mg daily with miralax every few days (constipation), and start ambien.     2/14: Transition from Seroquel to Abilify.  10 mg of Abilify is equal to 100 mg of Seroquel.  To help patient with sleep, start hydroxyzine in the evenings.  Also increase melatonin.     11/15: Increase melatonin and seroquel.     10/29: Seroquel 150 xr helped, but led to severe depression. Switch to Latuda. 20 minutes of supportive psychotherapy 3 wks.    9/16: Patient has  significant depression.  Insomnia is fairly controlled on multiple medications.  Mood and insomnia were better when we started Seroquel at the beginning of the year. Patient advised to back down on Klonopin to 0.5 mg nightly.  I will switch the Seroquel formulation to extended release in order to allow me to go up on the dose of the Seroquel to target bipolar hypomania.  Caution advised to the patient regarding sedation, dizziness, falls.  4 weeks.    8/18: now sleeping on klonopin 0.5 mg qhs, seroquel 75 mg qhs, melatonin 3 mg qhs. Continue.  Patient advised that if the above regimen does not work, to add an additional 25 mg of Seroquel after 1 hour, and only to add the Ambien if she is unable to sleep after 1 hour from taking that extra dose of Seroquel.  4 weeks.    8/9: Continued insomnia.  Chronic.  Order sleep study.  Increase Seroquel and start Colace to target constipation.  See back in 4 weeks.  Patient will also start melatonin 3 mg nightly over-the-counter.  No bipolar domingo or hypomania present today; however, it is possible that bipolar could be the reason for her insomnia.  Patient's scores indicate depression and anxiety, both of which will be treated with a higher dose of Seroquel.  Consider switching to Seroquel extended release, which the patient may tolerate better.    6/8: Not sleeping again. Restart ambien. Continue Seroquel.  Continue Klonopin. Consider switching to vraylar. See back in 2 months. Status post mirtazapine, trazodone, and she never tried olanzapine.  She may discontinue Ambien for good.  Psychotherapy is deferred at this time.      Visit Diagnoses:    ICD-10-CM ICD-9-CM   1. Panic attacks  F41.0 300.01   2. Insomnia due to mental condition  F51.05 300.9     327.02   3. Mixed bipolar II disorder  F31.81 296.89   4. Generalized anxiety disorder  F41.1 300.02       PLAN:  Safety: no acute safety concerns.  Risk Assessment: Risk Assessment: Risk of self-harm acutely is low. Risk  factors include mood disorder, access to weapons, recent psychosocial stressors (pandemic), family history. Protective factors include no present SI, no history of suicide attempts or self-harm in the past, no AODA, healthcare seeking, future orientation, willingness to engage in care, Holiness belief system. Risk of self-harm chronically is also low, but could be further elevated in the event of treatment noncompliance and/or AODA.  Safety: No acute safety concerns.  Medications:   CONTINUE ativan 0.5 mg 1-2 QHS. Risks, benefits, alternatives discussed with patient including GI upset, sedation, dizziness, falls risk. After discussion of these risks and benefits, the patient voiced understanding and agreed to proceed. Vern ordered. UDS ordered. Controlled substances agreement verbally signed.   INCREASE seroquel  to 300 mg. (Reduced from 250 to 200 mg on her own 5/25) Risks, benefits, alternatives discussed with patient including nausea and vomiting, GI upset, sedation, dizziness, falls, akathisia, hypotension, increased appetite, lowering of seizure threshold, theoretical risk of tardive dyskinesia, extrapyramidal symptoms, movement issues, restless legs syndrome. Use care when operating vehicle, vessel, or machine. After discussion of these risks and benefits, the patient voiced understanding and agreed to proceed.  CONTINUE trazodone 200 mg nightly. Risks, benefits, side effects discussed with patient including GI upset, sedation, dizziness/falls risk, grogginess the following day, prolongation of the QTc interval.  After discussion of these risks and benefits, the patient voiced understanding and agreed to proceed.    S/P   mirtazapine 15 mg qhs. (On her own, 5/25)   olanzapine 5 mg qhs. Not effective 1/2025.  quetiapine 50 mg qhs PRN insomnia. 10/24  gabapentin 600 mg qhs. 10/24  Klonopin 0.5 mg PO QHS PRN anxiety.   seroquel 100 mg nightly. Constip, higher cholesterol  NEVER STARTED belsomra 5 mg  nightly.  latuda 40 mg PO QDAY (samples given). No effect  Mirtazapine ineffective for insomnia.  Trazodone ineffective for insomnia (constipation at 100 mg nightly).  ambien ER 12.5 qhs. Somewhat helpful for insomnia in the past.  Doxepin caused constipation.  ambien 10 mg nightly. No particular reason.  Stopped melatonin 15 mg nightly. No reason.  NEVER STARTED prozac 10 mg daily after reassuring EKG (which we have).  Therapy: Deferred.      TREATMENT PLAN/GOALS: Continue supportive psychotherapy efforts and medications as indicated. Treatment and medication options discussed during today's visit. Patient ackowledged and verbally consented to continue with current treatment plan and was educated on the importance of compliance with treatment and follow-up appointments.    MEDICATION ISSUES:  PATRICIA reviewed as expected.  Discussed medication options and treatment plan of prescribed medication as well as the risks, benefits, and side effects including potential falls, possible impaired driving and metabolic adversities among others. Patient is agreeable to call the office with any worsening of symptoms or onset of side effects. Patient is agreeable to call 911 or go to the nearest ER should he/she begin having SI/HI. No medication side effects or related complaints today.     MEDS ORDERED DURING VISIT:  New Medications Ordered This Visit   Medications    QUEtiapine XR (SEROquel XR) 300 MG 24 hr tablet     Sig: Take 1 tablet by mouth Every Night.     Dispense:  90 tablet     Refill:  1     Increasing dose. Thank you for the help. Please call with questions: [phone].       Return in about 5 weeks (around 9/4/2025).         This document has been electronically signed by Susie Moran MD  July 31, 2025 14:38 EDT      Part of this note may be an electronic transcription/translation of spoken language to printed text using the Dragon Dictation System.

## 2025-07-31 NOTE — TREATMENT PLAN
Anxiety:  3/10 progressing    Goals:  Patient will develop and implement behavioral and cognitive strategies to reduce anxiety and irrational fears, monthly, using Rogerian psychotherapy and CBT where appropriate.  Help patient explore past emotional issues in relation to present anxiety, monthly, until remission of symptoms, using Rogerian psychotherapy and CBT where appropriate.  Help patient develop an awareness of their cognitive and physical responses to anxiety, monthly, until remission of symptoms, using Rogerian psychotherapy and CBT where appropriate.          Depression:  4/10 progressing    Goals:  Patient will demonstrate the ability to initiate new constructive life skills outside of sessions on a consistent basis, monthly, using Rogerian psychotherapy and CBT where appropriate.  Assist patient in setting attainable activities of daily living goals, monthly, using Rogerian psychotherapy and CBT where appropriate.  Provide education about depression, monthly, using Rogerian psychotherapy and CBT where appropriate.  Assist patient in developing healthy coping strategies, monthly, using Rogerian psychotherapy and CBT where appropriate.    Rogerian psychotherapy and CBT will be used to help accomplish the above goals and manage depression and anxiety related to insomnia, medical conditions, family well-being       I have discussed and reviewed this treatment plan with the patient.      Reviewed by Susie Moran MD   07/31/2025

## 2025-08-05 ENCOUNTER — LAB (OUTPATIENT)
Dept: LAB | Facility: HOSPITAL | Age: 75
End: 2025-08-05
Payer: MEDICARE

## 2025-08-05 DIAGNOSIS — D50.9 IRON DEFICIENCY ANEMIA, UNSPECIFIED IRON DEFICIENCY ANEMIA TYPE: ICD-10-CM

## 2025-08-06 ENCOUNTER — LAB (OUTPATIENT)
Facility: HOSPITAL | Age: 75
End: 2025-08-06
Payer: MEDICARE

## 2025-08-06 DIAGNOSIS — R53.83 OTHER FATIGUE: ICD-10-CM

## 2025-08-06 DIAGNOSIS — I10 PRIMARY HYPERTENSION: ICD-10-CM

## 2025-08-06 DIAGNOSIS — D50.9 IRON DEFICIENCY ANEMIA, UNSPECIFIED IRON DEFICIENCY ANEMIA TYPE: ICD-10-CM

## 2025-08-06 DIAGNOSIS — Z00.00 WELL ADULT EXAM: ICD-10-CM

## 2025-08-06 DIAGNOSIS — E78.2 MIXED HYPERLIPIDEMIA: ICD-10-CM

## 2025-08-06 DIAGNOSIS — E55.9 VITAMIN D DEFICIENCY: ICD-10-CM

## 2025-08-06 LAB
25(OH)D3 SERPL-MCNC: 46.8 NG/ML (ref 30–100)
ALBUMIN SERPL-MCNC: 3.9 G/DL (ref 3.5–5.2)
ALBUMIN/GLOB SERPL: 1.1 G/DL
ALP SERPL-CCNC: 74 U/L (ref 39–117)
ALT SERPL W P-5'-P-CCNC: 14 U/L (ref 1–33)
ANION GAP SERPL CALCULATED.3IONS-SCNC: 7.4 MMOL/L (ref 5–15)
AST SERPL-CCNC: 30 U/L (ref 1–32)
BASOPHILS # BLD MANUAL: 0.04 10*3/MM3 (ref 0–0.2)
BASOPHILS NFR BLD MANUAL: 1 % (ref 0–1.5)
BILIRUB SERPL-MCNC: 0.3 MG/DL (ref 0–1.2)
BUN SERPL-MCNC: 13 MG/DL (ref 8–23)
BUN/CREAT SERPL: 14.6 (ref 7–25)
CALCIUM SPEC-SCNC: 9.7 MG/DL (ref 8.6–10.5)
CHLORIDE SERPL-SCNC: 100 MMOL/L (ref 98–107)
CHOLEST SERPL-MCNC: 238 MG/DL (ref 0–200)
CO2 SERPL-SCNC: 29.6 MMOL/L (ref 22–29)
CORTIS AM PEAK SERPL-MCNC: 28.21 MCG/DL (ref 6.02–18.4)
CREAT SERPL-MCNC: 0.89 MG/DL (ref 0.57–1)
DEPRECATED RDW RBC AUTO: 43.8 FL (ref 37–54)
EGFRCR SERPLBLD CKD-EPI 2021: 67.3 ML/MIN/1.73
EOSINOPHIL # BLD MANUAL: 0.09 10*3/MM3 (ref 0–0.4)
EOSINOPHIL NFR BLD MANUAL: 2 % (ref 0.3–6.2)
ERYTHROCYTE [DISTWIDTH] IN BLOOD BY AUTOMATED COUNT: 12.3 % (ref 12.3–15.4)
FERRITIN SERPL-MCNC: 132 NG/ML (ref 13–150)
FOLATE SERPL-MCNC: 19 NG/ML (ref 4.78–24.2)
GLOBULIN UR ELPH-MCNC: 3.5 GM/DL
GLUCOSE SERPL-MCNC: 98 MG/DL (ref 65–99)
HBA1C MFR BLD: 5.4 % (ref 4.8–5.6)
HCT VFR BLD AUTO: 42.8 % (ref 34–46.6)
HDLC SERPL-MCNC: 85 MG/DL (ref 40–60)
HGB BLD-MCNC: 14.4 G/DL (ref 12–15.9)
IRON 24H UR-MRATE: 75 MCG/DL (ref 37–145)
IRON SATN MFR SERPL: 23 % (ref 20–50)
LDLC SERPL CALC-MCNC: 137 MG/DL (ref 0–100)
LDLC/HDLC SERPL: 1.58 {RATIO}
LYMPHOCYTES # BLD MANUAL: 1.9 10*3/MM3 (ref 0.7–3.1)
LYMPHOCYTES NFR BLD MANUAL: 12.1 % (ref 5–12)
MCH RBC QN AUTO: 32.8 PG (ref 26.6–33)
MCHC RBC AUTO-ENTMCNC: 33.6 G/DL (ref 31.5–35.7)
MCV RBC AUTO: 97.5 FL (ref 79–97)
METAMYELOCYTES NFR BLD MANUAL: 2 % (ref 0–0)
MONOCYTES # BLD: 0.52 10*3/MM3 (ref 0.1–0.9)
MYELOCYTES NFR BLD MANUAL: 1 % (ref 0–0)
NEUTROPHILS # BLD AUTO: 1.6 10*3/MM3 (ref 1.7–7)
NEUTROPHILS NFR BLD MANUAL: 37.4 % (ref 42.7–76)
NRBC BLD AUTO-RTO: 0 /100 WBC (ref 0–0.2)
PLAT MORPH BLD: NORMAL
PLATELET # BLD AUTO: 187 10*3/MM3 (ref 140–450)
PMV BLD AUTO: 10.9 FL (ref 6–12)
POTASSIUM SERPL-SCNC: 4.7 MMOL/L (ref 3.5–5.2)
PROLACTIN SERPL-MCNC: 23.1 NG/ML (ref 4.79–23.3)
PROT SERPL-MCNC: 7.4 G/DL (ref 6–8.5)
RBC # BLD AUTO: 4.39 10*6/MM3 (ref 3.77–5.28)
RBC MORPH BLD: NORMAL
SODIUM SERPL-SCNC: 137 MMOL/L (ref 136–145)
T3FREE SERPL-MCNC: 2.59 PG/ML (ref 2–4.4)
T4 FREE SERPL-MCNC: 0.7 NG/DL (ref 0.92–1.68)
TIBC SERPL-MCNC: 323 MCG/DL (ref 298–536)
TRANSFERRIN SERPL-MCNC: 217 MG/DL (ref 200–360)
TRIGL SERPL-MCNC: 92 MG/DL (ref 0–150)
TSH SERPL DL<=0.05 MIU/L-ACNC: 2.74 UIU/ML (ref 0.27–4.2)
VARIANT LYMPHS NFR BLD MANUAL: 44.4 % (ref 19.6–45.3)
VIT B12 BLD-MCNC: 591 PG/ML (ref 211–946)
VLDLC SERPL-MCNC: 16 MG/DL (ref 5–40)
WBC MORPH BLD: NORMAL
WBC NRBC COR # BLD AUTO: 4.28 10*3/MM3 (ref 3.4–10.8)

## 2025-08-06 PROCEDURE — 82306 VITAMIN D 25 HYDROXY: CPT

## 2025-08-06 PROCEDURE — 82746 ASSAY OF FOLIC ACID SERUM: CPT

## 2025-08-06 PROCEDURE — 82607 VITAMIN B-12: CPT

## 2025-08-06 PROCEDURE — 80053 COMPREHEN METABOLIC PANEL: CPT

## 2025-08-06 PROCEDURE — 85007 BL SMEAR W/DIFF WBC COUNT: CPT

## 2025-08-06 PROCEDURE — 82533 TOTAL CORTISOL: CPT

## 2025-08-06 PROCEDURE — 82728 ASSAY OF FERRITIN: CPT

## 2025-08-06 PROCEDURE — 84466 ASSAY OF TRANSFERRIN: CPT

## 2025-08-06 PROCEDURE — 36415 COLL VENOUS BLD VENIPUNCTURE: CPT

## 2025-08-06 PROCEDURE — 80061 LIPID PANEL: CPT

## 2025-08-06 PROCEDURE — 85025 COMPLETE CBC W/AUTO DIFF WBC: CPT

## 2025-08-06 PROCEDURE — 84481 FREE ASSAY (FT-3): CPT

## 2025-08-06 PROCEDURE — 84439 ASSAY OF FREE THYROXINE: CPT

## 2025-08-06 PROCEDURE — 83540 ASSAY OF IRON: CPT

## 2025-08-06 PROCEDURE — 83036 HEMOGLOBIN GLYCOSYLATED A1C: CPT

## 2025-08-06 PROCEDURE — 84146 ASSAY OF PROLACTIN: CPT

## 2025-08-06 PROCEDURE — 84443 ASSAY THYROID STIM HORMONE: CPT

## 2025-08-06 PROCEDURE — 82024 ASSAY OF ACTH: CPT

## 2025-08-07 LAB — ACTH PLAS-MCNC: 75.8 PG/ML (ref 7.2–63.3)

## 2025-08-12 ENCOUNTER — OFFICE VISIT (OUTPATIENT)
Age: 75
End: 2025-08-12
Payer: MEDICARE

## 2025-08-12 VITALS
DIASTOLIC BLOOD PRESSURE: 70 MMHG | HEART RATE: 77 BPM | SYSTOLIC BLOOD PRESSURE: 130 MMHG | WEIGHT: 91 LBS | OXYGEN SATURATION: 95 % | BODY MASS INDEX: 17.18 KG/M2 | HEIGHT: 61 IN

## 2025-08-12 DIAGNOSIS — R79.89 ELEVATED CORTISOL LEVEL: ICD-10-CM

## 2025-08-12 DIAGNOSIS — A31.0 PULMONARY MYCOBACTERIUM AVIUM COMPLEX (MAC) INFECTION: ICD-10-CM

## 2025-08-12 DIAGNOSIS — I10 PRIMARY HYPERTENSION: Primary | ICD-10-CM

## 2025-08-12 DIAGNOSIS — Z00.00 ENCOUNTER FOR SUBSEQUENT ANNUAL WELLNESS VISIT IN MEDICARE PATIENT: ICD-10-CM

## 2025-08-12 DIAGNOSIS — E55.9 VITAMIN D DEFICIENCY: ICD-10-CM

## 2025-08-12 DIAGNOSIS — Z78.0 POSTMENOPAUSAL: ICD-10-CM

## 2025-08-12 DIAGNOSIS — E78.2 MIXED HYPERLIPIDEMIA: ICD-10-CM

## 2025-08-12 DIAGNOSIS — R63.4 WEIGHT LOSS: ICD-10-CM

## 2025-08-12 PROBLEM — R41.3 MEMORY CHANGE: Status: RESOLVED | Noted: 2018-03-19 | Resolved: 2025-08-12

## 2025-08-12 PROBLEM — Z51.81 MEDICATION MONITORING ENCOUNTER: Status: RESOLVED | Noted: 2023-08-29 | Resolved: 2025-08-12

## 2025-08-12 PROBLEM — K11.7 XEROSTOMIA: Status: RESOLVED | Noted: 2023-08-29 | Resolved: 2025-08-12

## 2025-08-21 ENCOUNTER — OFFICE VISIT (OUTPATIENT)
Dept: PULMONOLOGY | Facility: CLINIC | Age: 75
End: 2025-08-21
Payer: MEDICARE

## 2025-08-21 VITALS
HEIGHT: 61 IN | HEART RATE: 82 BPM | WEIGHT: 89.8 LBS | BODY MASS INDEX: 16.95 KG/M2 | OXYGEN SATURATION: 97 % | RESPIRATION RATE: 16 BRPM | DIASTOLIC BLOOD PRESSURE: 86 MMHG | TEMPERATURE: 98.2 F | SYSTOLIC BLOOD PRESSURE: 153 MMHG

## 2025-08-21 DIAGNOSIS — R05.9 COUGH, UNSPECIFIED TYPE: ICD-10-CM

## 2025-08-21 DIAGNOSIS — J15.1 PNEUMONIA DUE TO PSEUDOMONAS SPECIES, UNSPECIFIED LATERALITY, UNSPECIFIED PART OF LUNG: ICD-10-CM

## 2025-08-21 DIAGNOSIS — J43.9 PULMONARY EMPHYSEMA, UNSPECIFIED EMPHYSEMA TYPE: ICD-10-CM

## 2025-08-21 DIAGNOSIS — A31.0 PULMONARY MYCOBACTERIUM AVIUM COMPLEX (MAC) INFECTION: Primary | ICD-10-CM

## 2025-08-21 DIAGNOSIS — F17.201 TOBACCO ABUSE, IN REMISSION: ICD-10-CM

## 2025-08-21 DIAGNOSIS — Z51.81 MEDICATION MONITORING ENCOUNTER: ICD-10-CM

## (undated) DEVICE — KT VLV BIOP DEFENDO SXN AIR/WATER

## (undated) DEVICE — Device

## (undated) DEVICE — DEV INFL CRE STERIFLATE 60CC DISP

## (undated) DEVICE — BALN DIL ESOPH CRE CONTRL RADL 15/18MM 8CM

## (undated) DEVICE — CONN JET HYDRA H20 AUXILIARY DISP

## (undated) DEVICE — BLCK/BITE BLOX WO/DENTL/RIM W/STRAP 54F

## (undated) DEVICE — SOLIDIFIER LIQLOC PLS 1500CC BT

## (undated) DEVICE — LINER SURG CANSTR SXN S/RIGD 1500CC

## (undated) DEVICE — SOL IRRG H2O PL/BG 1000ML STRL

## (undated) DEVICE — FRCP BX RADJAW4 LG/CAP 2.8 240CM BX80